# Patient Record
Sex: MALE | Race: WHITE | NOT HISPANIC OR LATINO | Employment: UNEMPLOYED | URBAN - METROPOLITAN AREA
[De-identification: names, ages, dates, MRNs, and addresses within clinical notes are randomized per-mention and may not be internally consistent; named-entity substitution may affect disease eponyms.]

---

## 2019-08-26 PROBLEM — D72.829 LEUKOCYTOSIS: Status: ACTIVE | Noted: 2019-08-26

## 2019-08-26 PROBLEM — I46.9 CARDIAC ARREST: Status: ACTIVE | Noted: 2019-08-26

## 2019-08-29 PROBLEM — R56.9 SEIZURE: Status: ACTIVE | Noted: 2019-08-29

## 2019-09-01 PROBLEM — D72.829 LEUKOCYTOSIS: Status: RESOLVED | Noted: 2019-08-26 | Resolved: 2019-09-01

## 2019-09-01 PROBLEM — G92.9 ENCEPHALOPATHY, TOXIC: Status: ACTIVE | Noted: 2019-09-01

## 2022-02-02 ENCOUNTER — HISTORICAL (OUTPATIENT)
Dept: ADMINISTRATIVE | Facility: HOSPITAL | Age: 32
End: 2022-02-02

## 2023-04-13 ENCOUNTER — HOSPITAL ENCOUNTER (EMERGENCY)
Facility: HOSPITAL | Age: 33
Discharge: LEFT BEFORE DISCHARGE | End: 2023-04-14
Attending: STUDENT IN AN ORGANIZED HEALTH CARE EDUCATION/TRAINING PROGRAM
Payer: MEDICAID

## 2023-04-13 DIAGNOSIS — Z53.29 LEFT AGAINST MEDICAL ADVICE: Primary | ICD-10-CM

## 2023-04-13 DIAGNOSIS — R00.2 PALPITATIONS: ICD-10-CM

## 2023-04-13 PROCEDURE — 99284 EMERGENCY DEPT VISIT MOD MDM: CPT

## 2023-04-13 NOTE — LETTER
Patient: Britton Perez  YOB: 1990  Date: 4/14/2023 Time: 12:30 AM  Location: [facility]    Leaving the Hospital Against Medical Advice    Chart #:[id number]    This will certify that I, the undersigned,    ______________________________________________________________________    A patient in the above named medical center, having requested discharge and removal from the medical center against the advice of my attending physician(s), hereby release [facility], its physicians, officers and employees, severally and individually, from any and all liability of any nature whatsoever for any injury or harm or complication of any kind that may result directly or indirectly, by reason of my terminating my stay as a patient at [facility] and my departure from [facility], and hereby waive any and all rights of action I may now have or later acquire as a result of my voluntary departure from [facility] and the termination of my stay as a patient therein.    This release is made with the full knowledge of the danger that may result from the action which I am taking.      Date:_______________________                         ___________________________                                                                                    Patient/Legal Representative    Witness:        ____________________________                          ___________________________  Nurse                                                                        Physician

## 2023-04-14 VITALS
SYSTOLIC BLOOD PRESSURE: 135 MMHG | TEMPERATURE: 97 F | HEART RATE: 104 BPM | DIASTOLIC BLOOD PRESSURE: 83 MMHG | OXYGEN SATURATION: 97 % | RESPIRATION RATE: 21 BRPM | WEIGHT: 150.13 LBS | BODY MASS INDEX: 19.27 KG/M2 | HEIGHT: 74 IN

## 2023-04-14 NOTE — ED PROVIDER NOTES
Encounter Date: 4/13/2023       History     Chief Complaint   Patient presents with    Palpitations     Palpitations while sitting at home PTA. H/O AICD     Pt is a 32 y.o. male who presents to the [facility] ED complaining of an episode of palpitations which began while he was watching television earlier tonight. Denies chest pain, SOB, weakness, dizziness, fever, nausea, vomiting, or loss of bowel or bladder control. Hx of HIV, HTN, heart failure, anxiety, seizure disorder. Reports having an internal defibrillator but denies experiencing a shock.    Review of patient's allergies indicates:   Allergen Reactions    Penicillins      Past Medical History:   Diagnosis Date    Cardiac arrest 08/2019    pulseless, AED required    Depression     Heart failure, type unknown     fr other facility medical record    Hepatitis C     HIV (human immunodeficiency virus infection)     HTN (hypertension)     IV drug user     Myocardial infarct, old     fr transfer medical records    Neuropathy     left foot    Overdose     heroine, meth    Pneumonia     Polysubstance abuse     heroin methadone    PTSD (post-traumatic stress disorder)     from prev medical record    Seizure 08/2019    POSSIBLE Seizure prior to cardiac arrest, unclear     Past Surgical History:   Procedure Laterality Date    CHEST TUBE INSERTION Bilateral     bilat when intubated in the past (removed)    CORONARY ANGIOGRAPHY N/A 8/30/2019    Procedure: ANGIOGRAM, CORONARY ARTERY;  Surgeon: Misbah Culp MD;  Location: [facility] [place] CATH;  Service: Cardiology;  Laterality: N/A;    GASTROSTOMY TUBE PLACEMENT      fr other facility medical record    INSERTION OF PACEMAKER  2007    LEFT HEART CATHETERIZATION Left 8/30/2019    Procedure: Left heart cath;  Surgeon: Misbah Culp MD;  Location: [facility] [place] CATH;  Service: Cardiology;  Laterality: Left;    REMOVAL OF PACEMAKER  2014    TRACHEOSTOMY      x2 in the past (removed/healed as of August 2019)     No family history on file.  Social  History     Tobacco Use    Smoking status: Every Day   Substance Use Topics    Drug use: Yes     Types: IV, Methamphetamines     Comment: heroine opiates     Review of Systems   Constitutional:  Negative for chills, diaphoresis, fatigue and fever.   HENT:  Negative for facial swelling, postnasal drip, rhinorrhea, sinus pressure, sinus pain, sore throat and trouble swallowing.    Respiratory:  Negative for cough, chest tightness, shortness of breath and wheezing.    Cardiovascular:  Positive for palpitations. Negative for chest pain and leg swelling.   Gastrointestinal:  Negative for abdominal pain, diarrhea, nausea and vomiting.   Genitourinary:  Negative for dysuria, flank pain, hematuria and urgency.   Musculoskeletal:  Negative for arthralgias, back pain and myalgias.   Skin:  Negative for color change and rash.   Neurological:  Negative for dizziness, syncope, weakness and headaches.   Hematological:  Does not bruise/bleed easily.   All other systems reviewed and are negative.    Physical Exam     Initial Vitals [04/13/23 2339]   BP Pulse Resp Temp SpO2   135/83 (!) 134 20 97.3 °F (36.3 °C) 98 %      MAP       --         Physical Exam    Nursing note and vitals reviewed.  Constitutional: Vital signs are normal. He appears well-developed and well-nourished.   HENT:   Head: Normocephalic.   Nose: Nose normal.   Mouth/Throat: Oropharynx is clear and moist.   Eyes: Conjunctivae and EOM are normal. Pupils are equal, round, and reactive to light.   Neck: Neck supple.   Normal range of motion.  Cardiovascular:  Normal rate, regular rhythm, normal heart sounds and intact distal pulses.           Pulmonary/Chest: Effort normal and breath sounds normal. No respiratory distress. He has no wheezes. He has no rhonchi. He has no rales. He exhibits no tenderness.   Abdominal: Abdomen is soft and flat. Bowel sounds are normal. There is no abdominal tenderness. There is no rebound, no guarding, no tenderness at McBurney's point  and negative Baca's sign.   Musculoskeletal:         General: Normal range of motion.      Cervical back: Normal range of motion and neck supple.     Neurological: He is alert and oriented to person, place, and time. He has normal strength.   Skin: Skin is warm and dry. Capillary refill takes less than 2 seconds.   Psychiatric: His behavior is normal. Judgment and thought content normal. His mood appears anxious.       ED Course   Procedures  Labs Reviewed - No data to display         Imaging Results              X-Ray Chest 1 View (Final result)  Result time 04/14/23 07:11:55      Final result by Jero Ahumada MD (04/14/23 07:11:55)                   Impression:      Interval revision of cardiac device.  No acute cardiopulmonary process.      Electronically signed by: Jero Ahumada  Date:    04/14/2023  Time:    07:11               Narrative:    EXAMINATION:  XR CHEST 1 VIEW    CLINICAL HISTORY:  Palpitations    TECHNIQUE:  Single view of the chest    COMPARISON:  09/03/2021    FINDINGS:  Trace bilateral pleural effusions with left-sided cardiac device.  No pneumothorax.  No focal opacification.                                       Medications - No data to display    Medical Decision Making:   Differential Diagnosis:   Anemia  Electrolyte imbalance  Palpitations  Anxiety  AMI  Clinical Tests:   Lab Tests: Ordered  Radiological Study: Ordered  Medical Tests: Ordered  ED Management:  12:32 AM I was just informed by ED nursing staff that pt is feeling better and requesting to leave the department. I have discussed with opt that due to his symptoms, I feel that diagnostic testing is appropriate. Pt verbalizing understanding, continues to request discharge. AMA form supplied and signed per pt.      APC / Resident Notes:   I was not physically present during the history, exam or disposition of this patient. I was available at all times for consultation. (Zmora)                   Clinical Impression:   Final  diagnoses:  [R00.2] Palpitations  [Z53.29] Left against medical advice (Primary)        ED Disposition Condition    AMA Stable                Kwabena Serrano Jr., NESSA  04/14/23 0034       Darryn Chavez MD  04/22/23 8096

## 2024-01-21 ENCOUNTER — HOSPITAL ENCOUNTER (EMERGENCY)
Facility: HOSPITAL | Age: 34
Discharge: ANOTHER HOSPITAL | End: 2024-01-21
Attending: EMERGENCY MEDICINE
Payer: COMMERCIAL

## 2024-01-21 ENCOUNTER — HOSPITAL ENCOUNTER (INPATIENT)
Facility: HOSPITAL | Age: 34
LOS: 6 days | Discharge: HOME | DRG: 309 | End: 2024-01-27
Attending: EMERGENCY MEDICINE | Admitting: INTERNAL MEDICINE
Payer: MEDICAID

## 2024-01-21 VITALS
TEMPERATURE: 100 F | WEIGHT: 155 LBS | RESPIRATION RATE: 15 BRPM | BODY MASS INDEX: 19.89 KG/M2 | OXYGEN SATURATION: 100 % | DIASTOLIC BLOOD PRESSURE: 59 MMHG | SYSTOLIC BLOOD PRESSURE: 114 MMHG | HEART RATE: 59 BPM | HEIGHT: 74 IN

## 2024-01-21 DIAGNOSIS — I47.20 VT (VENTRICULAR TACHYCARDIA): ICD-10-CM

## 2024-01-21 DIAGNOSIS — I25.10 CAD (CORONARY ARTERY DISEASE): ICD-10-CM

## 2024-01-21 DIAGNOSIS — B20 CURRENTLY ASYMPTOMATIC HIV INFECTION, WITH HISTORY OF HIV-RELATED ILLNESS: ICD-10-CM

## 2024-01-21 DIAGNOSIS — B20 HIV INFECTION, UNSPECIFIED SYMPTOM STATUS: ICD-10-CM

## 2024-01-21 DIAGNOSIS — I45.81 PROLONGED QT INTERVAL SYNDROME: ICD-10-CM

## 2024-01-21 DIAGNOSIS — I47.20 V-TACH: ICD-10-CM

## 2024-01-21 DIAGNOSIS — R94.31 PROLONGED Q-T INTERVAL ON ECG: ICD-10-CM

## 2024-01-21 DIAGNOSIS — I49.01 VENTRICULAR FIBRILLATION: Primary | ICD-10-CM

## 2024-01-21 DIAGNOSIS — I49.9 ARRHYTHMIA: ICD-10-CM

## 2024-01-21 DIAGNOSIS — Z78.9 PACED RHYTHM ON CARDIAC MONITOR: ICD-10-CM

## 2024-01-21 DIAGNOSIS — I47.20 V TACH: Primary | ICD-10-CM

## 2024-01-21 DIAGNOSIS — R07.9 CHEST PAIN: ICD-10-CM

## 2024-01-21 DIAGNOSIS — Z13.9 SCREENING DUE: ICD-10-CM

## 2024-01-21 LAB
ALBUMIN SERPL-MCNC: 4.3 G/DL (ref 3.5–5)
ALBUMIN/GLOB SERPL: 0.9 RATIO (ref 1.1–2)
ALP SERPL-CCNC: 91 UNIT/L (ref 40–150)
ALT SERPL-CCNC: 8 UNIT/L (ref 0–55)
AST SERPL-CCNC: 20 UNIT/L (ref 5–34)
BASOPHILS # BLD AUTO: 0.01 X10(3)/MCL
BASOPHILS NFR BLD AUTO: 0.1 %
BILIRUB SERPL-MCNC: 0.6 MG/DL
BUN SERPL-MCNC: 15.5 MG/DL (ref 8.9–20.6)
CALCIUM SERPL-MCNC: 9.9 MG/DL (ref 8.4–10.2)
CHLORIDE SERPL-SCNC: 103 MMOL/L (ref 98–107)
CK MB SERPL-MCNC: 4.3 NG/ML
CK SERPL-CCNC: 338 U/L (ref 30–200)
CO2 SERPL-SCNC: 21 MMOL/L (ref 22–29)
CREAT SERPL-MCNC: 0.89 MG/DL (ref 0.73–1.18)
EOSINOPHIL # BLD AUTO: 0 X10(3)/MCL (ref 0–0.9)
EOSINOPHIL NFR BLD AUTO: 0 %
ERYTHROCYTE [DISTWIDTH] IN BLOOD BY AUTOMATED COUNT: 12.8 % (ref 11.5–17)
FLUAV AG UPPER RESP QL IA.RAPID: NOT DETECTED
FLUBV AG UPPER RESP QL IA.RAPID: NOT DETECTED
GFR SERPLBLD CREATININE-BSD FMLA CKD-EPI: >60 MLS/MIN/1.73/M2
GLOBULIN SER-MCNC: 4.8 GM/DL (ref 2.4–3.5)
GLUCOSE SERPL-MCNC: 105 MG/DL (ref 74–100)
HCT VFR BLD AUTO: 42.3 % (ref 42–52)
HGB BLD-MCNC: 14.9 G/DL (ref 14–18)
HOLD SPECIMEN: NORMAL
HOLD SPECIMEN: NORMAL
IMM GRANULOCYTES # BLD AUTO: 0.02 X10(3)/MCL (ref 0–0.04)
IMM GRANULOCYTES NFR BLD AUTO: 0.2 %
LACTATE SERPL-SCNC: 2.1 MMOL/L (ref 0.5–2.2)
LYMPHOCYTES # BLD AUTO: 1.07 X10(3)/MCL (ref 0.6–4.6)
LYMPHOCYTES NFR BLD AUTO: 11.9 %
MAGNESIUM SERPL-MCNC: 2.2 MG/DL (ref 1.6–2.6)
MCH RBC QN AUTO: 29.5 PG (ref 27–31)
MCHC RBC AUTO-ENTMCNC: 35.2 G/DL (ref 33–36)
MCV RBC AUTO: 83.8 FL (ref 80–94)
MONOCYTES # BLD AUTO: 0.19 X10(3)/MCL (ref 0.1–1.3)
MONOCYTES NFR BLD AUTO: 2.1 %
NEUTROPHILS # BLD AUTO: 7.67 X10(3)/MCL (ref 2.1–9.2)
NEUTROPHILS NFR BLD AUTO: 85.7 %
NRBC BLD AUTO-RTO: 0 %
PLATELET # BLD AUTO: 202 X10(3)/MCL (ref 130–400)
PMV BLD AUTO: 9.8 FL (ref 7.4–10.4)
POCT GLUCOSE: 104 MG/DL (ref 70–110)
POTASSIUM SERPL-SCNC: 4.1 MMOL/L (ref 3.5–5.1)
PROT SERPL-MCNC: 9.1 GM/DL (ref 6.4–8.3)
RBC # BLD AUTO: 5.05 X10(6)/MCL (ref 4.7–6.1)
SARS-COV-2 RNA RESP QL NAA+PROBE: NOT DETECTED
SODIUM SERPL-SCNC: 138 MMOL/L (ref 136–145)
T4 FREE SERPL-MCNC: 1.16 NG/DL (ref 0.7–1.48)
TROPONIN I SERPL-MCNC: <0.01 NG/ML (ref 0–0.04)
TSH SERPL-ACNC: 0.29 UIU/ML (ref 0.35–4.94)
WBC # SPEC AUTO: 8.96 X10(3)/MCL (ref 4.5–11.5)

## 2024-01-21 PROCEDURE — 85025 COMPLETE CBC W/AUTO DIFF WBC: CPT | Performed by: EMERGENCY MEDICINE

## 2024-01-21 PROCEDURE — 63600175 PHARM REV CODE 636 W HCPCS

## 2024-01-21 PROCEDURE — 82550 ASSAY OF CK (CPK): CPT | Performed by: EMERGENCY MEDICINE

## 2024-01-21 PROCEDURE — 84443 ASSAY THYROID STIM HORMONE: CPT | Performed by: EMERGENCY MEDICINE

## 2024-01-21 PROCEDURE — 93010 ELECTROCARDIOGRAM REPORT: CPT | Mod: ,,, | Performed by: STUDENT IN AN ORGANIZED HEALTH CARE EDUCATION/TRAINING PROGRAM

## 2024-01-21 PROCEDURE — 82962 GLUCOSE BLOOD TEST: CPT

## 2024-01-21 PROCEDURE — 80053 COMPREHEN METABOLIC PANEL: CPT | Performed by: EMERGENCY MEDICINE

## 2024-01-21 PROCEDURE — 20000000 HC ICU ROOM

## 2024-01-21 PROCEDURE — 25000003 PHARM REV CODE 250: Performed by: EMERGENCY MEDICINE

## 2024-01-21 PROCEDURE — 99285 EMERGENCY DEPT VISIT HI MDM: CPT | Mod: 25,27

## 2024-01-21 PROCEDURE — 84439 ASSAY OF FREE THYROXINE: CPT | Performed by: INTERNAL MEDICINE

## 2024-01-21 PROCEDURE — 96374 THER/PROPH/DIAG INJ IV PUSH: CPT | Mod: 59

## 2024-01-21 PROCEDURE — 83735 ASSAY OF MAGNESIUM: CPT | Performed by: EMERGENCY MEDICINE

## 2024-01-21 PROCEDURE — 63600175 PHARM REV CODE 636 W HCPCS: Performed by: EMERGENCY MEDICINE

## 2024-01-21 PROCEDURE — 99285 EMERGENCY DEPT VISIT HI MDM: CPT | Mod: 25

## 2024-01-21 PROCEDURE — 84484 ASSAY OF TROPONIN QUANT: CPT | Performed by: EMERGENCY MEDICINE

## 2024-01-21 PROCEDURE — 63600175 PHARM REV CODE 636 W HCPCS: Performed by: STUDENT IN AN ORGANIZED HEALTH CARE EDUCATION/TRAINING PROGRAM

## 2024-01-21 PROCEDURE — 86803 HEPATITIS C AB TEST: CPT | Performed by: INTERNAL MEDICINE

## 2024-01-21 PROCEDURE — 96375 TX/PRO/DX INJ NEW DRUG ADDON: CPT

## 2024-01-21 PROCEDURE — 0240U COVID/FLU A&B PCR: CPT | Performed by: EMERGENCY MEDICINE

## 2024-01-21 PROCEDURE — 96365 THER/PROPH/DIAG IV INF INIT: CPT

## 2024-01-21 PROCEDURE — 82553 CREATINE MB FRACTION: CPT | Performed by: EMERGENCY MEDICINE

## 2024-01-21 PROCEDURE — 96374 THER/PROPH/DIAG INJ IV PUSH: CPT

## 2024-01-21 PROCEDURE — 93005 ELECTROCARDIOGRAM TRACING: CPT

## 2024-01-21 PROCEDURE — 83605 ASSAY OF LACTIC ACID: CPT | Performed by: EMERGENCY MEDICINE

## 2024-01-21 RX ORDER — ONDANSETRON HYDROCHLORIDE 2 MG/ML
4 INJECTION, SOLUTION INTRAVENOUS
Status: COMPLETED | OUTPATIENT
Start: 2024-01-21 | End: 2024-01-21

## 2024-01-21 RX ORDER — MORPHINE SULFATE 4 MG/ML
INJECTION, SOLUTION INTRAMUSCULAR; INTRAVENOUS
Status: DISCONTINUED
Start: 2024-01-21 | End: 2024-01-21 | Stop reason: WASHOUT

## 2024-01-21 RX ORDER — FAMOTIDINE 20 MG/1
20 TABLET, FILM COATED ORAL 2 TIMES DAILY
Status: DISCONTINUED | OUTPATIENT
Start: 2024-01-22 | End: 2024-01-25

## 2024-01-21 RX ORDER — MAGNESIUM SULFATE HEPTAHYDRATE 40 MG/ML
2 INJECTION, SOLUTION INTRAVENOUS
Status: COMPLETED | OUTPATIENT
Start: 2024-01-21 | End: 2024-01-21

## 2024-01-21 RX ORDER — LIDOCAINE HYDROCHLORIDE ANHYDROUS AND DEXTROSE MONOHYDRATE .8; 5 G/100ML; G/100ML
1 INJECTION, SOLUTION INTRAVENOUS CONTINUOUS
Status: DISCONTINUED | OUTPATIENT
Start: 2024-01-21 | End: 2024-01-21 | Stop reason: HOSPADM

## 2024-01-21 RX ORDER — METOCLOPRAMIDE HYDROCHLORIDE 5 MG/ML
10 INJECTION INTRAMUSCULAR; INTRAVENOUS
Status: COMPLETED | OUTPATIENT
Start: 2024-01-21 | End: 2024-01-21

## 2024-01-21 RX ORDER — AMIODARONE HYDROCHLORIDE 150 MG/3ML
150 INJECTION, SOLUTION INTRAVENOUS
Status: COMPLETED | OUTPATIENT
Start: 2024-01-21 | End: 2024-01-21

## 2024-01-21 RX ORDER — MORPHINE SULFATE 4 MG/ML
2 INJECTION, SOLUTION INTRAMUSCULAR; INTRAVENOUS EVERY 6 HOURS PRN
Status: DISCONTINUED | OUTPATIENT
Start: 2024-01-22 | End: 2024-01-22

## 2024-01-21 RX ORDER — MORPHINE SULFATE 4 MG/ML
4 INJECTION, SOLUTION INTRAMUSCULAR; INTRAVENOUS
Status: COMPLETED | OUTPATIENT
Start: 2024-01-21 | End: 2024-01-21

## 2024-01-21 RX ORDER — MAGNESIUM SULFATE HEPTAHYDRATE 40 MG/ML
2 INJECTION, SOLUTION INTRAVENOUS
Status: DISCONTINUED | OUTPATIENT
Start: 2024-01-21 | End: 2024-01-27 | Stop reason: HOSPADM

## 2024-01-21 RX ORDER — POTASSIUM CHLORIDE 7.45 MG/ML
20 INJECTION INTRAVENOUS DAILY PRN
Status: DISCONTINUED | OUTPATIENT
Start: 2024-01-21 | End: 2024-01-27 | Stop reason: HOSPADM

## 2024-01-21 RX ORDER — LIDOCAINE HYDROCHLORIDE ANHYDROUS AND DEXTROSE MONOHYDRATE .8; 5 G/100ML; G/100ML
0.5 INJECTION, SOLUTION INTRAVENOUS CONTINUOUS
Status: DISCONTINUED | OUTPATIENT
Start: 2024-01-22 | End: 2024-01-27

## 2024-01-21 RX ORDER — THIAMINE HCL 100 MG
100 TABLET ORAL DAILY
Status: DISCONTINUED | OUTPATIENT
Start: 2024-01-22 | End: 2024-01-27 | Stop reason: HOSPADM

## 2024-01-21 RX ORDER — ENOXAPARIN SODIUM 100 MG/ML
40 INJECTION SUBCUTANEOUS EVERY 24 HOURS
Status: DISCONTINUED | OUTPATIENT
Start: 2024-01-22 | End: 2024-01-27 | Stop reason: HOSPADM

## 2024-01-21 RX ORDER — AMIODARONE HYDROCHLORIDE 150 MG/3ML
INJECTION, SOLUTION INTRAVENOUS
Status: COMPLETED
Start: 2024-01-21 | End: 2024-01-21

## 2024-01-21 RX ORDER — SODIUM CHLORIDE 0.9 % (FLUSH) 0.9 %
10 SYRINGE (ML) INJECTION
Status: DISCONTINUED | OUTPATIENT
Start: 2024-01-21 | End: 2024-01-27 | Stop reason: HOSPADM

## 2024-01-21 RX ADMIN — MORPHINE SULFATE 4 MG: 4 INJECTION, SOLUTION INTRAMUSCULAR; INTRAVENOUS at 09:01

## 2024-01-21 RX ADMIN — MAGNESIUM SULFATE HEPTAHYDRATE 2 G: 40 INJECTION, SOLUTION INTRAVENOUS at 07:01

## 2024-01-21 RX ADMIN — AMIODARONE HYDROCHLORIDE 150 MG: 50 INJECTION, SOLUTION INTRAVENOUS at 09:01

## 2024-01-21 RX ADMIN — SODIUM CHLORIDE 2000 ML: 9 INJECTION, SOLUTION INTRAVENOUS at 07:01

## 2024-01-21 RX ADMIN — ONDANSETRON 4 MG: 2 INJECTION INTRAMUSCULAR; INTRAVENOUS at 06:01

## 2024-01-21 RX ADMIN — ONDANSETRON 4 MG: 2 INJECTION INTRAMUSCULAR; INTRAVENOUS at 09:01

## 2024-01-21 RX ADMIN — AMIODARONE HYDROCHLORIDE 1 MG/MIN: 1.8 INJECTION, SOLUTION INTRAVENOUS at 09:01

## 2024-01-21 RX ADMIN — METOCLOPRAMIDE 10 MG: 5 INJECTION, SOLUTION INTRAMUSCULAR; INTRAVENOUS at 10:01

## 2024-01-21 RX ADMIN — AMIODARONE HYDROCHLORIDE 150 MG: 150 INJECTION, SOLUTION INTRAVENOUS at 09:01

## 2024-01-21 NOTE — Clinical Note
The catheter was inserted in the right ventricle. The transvenous pacing lead was inserted into the RV and was placed and capture was confirmed. The pacer was paced at 90 BPM 5 mA 5 mV.

## 2024-01-21 NOTE — Clinical Note
The groin and neck was prepped. The site was prepped with ChloraPrep. The site was clipped. The patient was draped.

## 2024-01-22 LAB
AGE: 35
ALBUMIN SERPL-MCNC: 3.6 G/DL (ref 3.5–5)
ALBUMIN/GLOB SERPL: 1 RATIO (ref 1.1–2)
ALP SERPL-CCNC: 79 UNIT/L (ref 40–150)
ALT SERPL-CCNC: 7 UNIT/L (ref 0–55)
AST SERPL-CCNC: 13 UNIT/L (ref 5–34)
AV INDEX (PROSTH): 0.52
AV MEAN GRADIENT: 5 MMHG
AV PEAK GRADIENT: 9 MMHG
AV VELOCITY RATIO: 0.49
BASOPHILS # BLD AUTO: 0.01 X10(3)/MCL
BASOPHILS NFR BLD AUTO: 0.1 %
BILIRUB SERPL-MCNC: 0.4 MG/DL
BSA FOR ECHO PROCEDURE: 1.92 M2
BUN SERPL-MCNC: 14.5 MG/DL (ref 8.9–20.6)
CALCIUM SERPL-MCNC: 8.8 MG/DL (ref 8.4–10.2)
CD3+CD4+ CELLS # SPEC: 62 UNIT/L (ref 589–1505)
CD3+CD4+ CELLS NFR BLD: 6 %
CHLORIDE SERPL-SCNC: 107 MMOL/L (ref 98–107)
CO2 SERPL-SCNC: 20 MMOL/L (ref 22–29)
CREAT SERPL-MCNC: 0.71 MG/DL (ref 0.73–1.18)
CV ECHO LV RWT: 0.46 CM
DOP CALC AO PEAK VEL: 1.5 M/S
DOP CALC AO VTI: 28.7 CM
DOP CALC LVOT PEAK VEL: 0.73 M/S
DOP CALCLVOT PEAK VEL VTI: 14.8 CM
E WAVE DECELERATION TIME: 214 MSEC
E/A RATIO: 1.69
E/E' RATIO: 8.27 M/S
ECHO LV POSTERIOR WALL: 1.1 CM (ref 0.6–1.1)
EOSINOPHIL # BLD AUTO: 0 X10(3)/MCL (ref 0–0.9)
EOSINOPHIL NFR BLD AUTO: 0 %
ERYTHROCYTE [DISTWIDTH] IN BLOOD BY AUTOMATED COUNT: 12.8 % (ref 11.5–17)
FRACTIONAL SHORTENING: 33 % (ref 28–44)
GFR SERPLBLD CREATININE-BSD FMLA CKD-EPI: >60 MLS/MIN/1.73/M2
GLOBULIN SER-MCNC: 3.5 GM/DL (ref 2.4–3.5)
GLUCOSE SERPL-MCNC: 110 MG/DL (ref 74–100)
HCT VFR BLD AUTO: 38.5 % (ref 42–52)
HCV AB SERPL QL IA: REACTIVE
HGB BLD-MCNC: 13.4 G/DL (ref 14–18)
IMM GRANULOCYTES # BLD AUTO: 0.02 X10(3)/MCL (ref 0–0.04)
IMM GRANULOCYTES NFR BLD AUTO: 0.2 %
INTERVENTRICULAR SEPTUM: 0.8 CM (ref 0.6–1.1)
LEFT ATRIUM SIZE: 3.2 CM
LEFT ATRIUM VOLUME INDEX MOD: 19.4 ML/M2
LEFT ATRIUM VOLUME MOD: 37.8 CM3
LEFT INTERNAL DIMENSION IN SYSTOLE: 3.2 CM (ref 2.1–4)
LEFT VENTRICLE DIASTOLIC VOLUME INDEX: 55.38 ML/M2
LEFT VENTRICLE DIASTOLIC VOLUME: 108 ML
LEFT VENTRICLE MASS INDEX: 81 G/M2
LEFT VENTRICLE SYSTOLIC VOLUME INDEX: 21 ML/M2
LEFT VENTRICLE SYSTOLIC VOLUME: 41 ML
LEFT VENTRICULAR INTERNAL DIMENSION IN DIASTOLE: 4.8 CM (ref 3.5–6)
LEFT VENTRICULAR MASS: 158.82 G
LV LATERAL E/E' RATIO: 8.27 M/S
LV SEPTAL E/E' RATIO: 8.27 M/S
LVOT MG: 1 MMHG
LVOT MV: 0.46 CM/S
LYMPHOCYTES # BLD AUTO: 0.99 X10(3)/MCL (ref 0.6–4.6)
LYMPHOCYTES # BLD AUTO: 1032 X10(3)/MCL (ref 1260–5520)
LYMPHOCYTES NFR BLD AUTO: 11.5 %
LYMPHOCYTES NFR LN MANUAL: 12 % (ref 28–48)
LYMPHOMA - T-CELL MARKERS SPEC-IMP: ABNORMAL
MAGNESIUM SERPL-MCNC: 2.1 MG/DL (ref 1.6–2.6)
MAGNESIUM SERPL-MCNC: 2.2 MG/DL (ref 1.6–2.6)
MAGNESIUM SERPL-MCNC: 2.3 MG/DL (ref 1.6–2.6)
MCH RBC QN AUTO: 29.2 PG (ref 27–31)
MCHC RBC AUTO-ENTMCNC: 34.8 G/DL (ref 33–36)
MCV RBC AUTO: 83.9 FL (ref 80–94)
MONOCYTES # BLD AUTO: 0.2 X10(3)/MCL (ref 0.1–1.3)
MONOCYTES NFR BLD AUTO: 2.3 %
MV PEAK A VEL: 0.54 M/S
MV PEAK E VEL: 0.91 M/S
NEUTROPHILS # BLD AUTO: 7.38 X10(3)/MCL (ref 2.1–9.2)
NEUTROPHILS NFR BLD AUTO: 85.9 %
NRBC BLD AUTO-RTO: 0 %
OHS LV EJECTION FRACTION SIMPSONS BIPLANE MOD: 58 %
PHOSPHATE SERPL-MCNC: 2.4 MG/DL (ref 2.3–4.7)
PISA TR MAX VEL: 1.33 M/S
PLATELET # BLD AUTO: 164 X10(3)/MCL (ref 130–400)
PMV BLD AUTO: 10.1 FL (ref 7.4–10.4)
POTASSIUM SERPL-SCNC: 3.8 MMOL/L (ref 3.5–5.1)
POTASSIUM SERPL-SCNC: 3.9 MMOL/L (ref 3.5–5.1)
POTASSIUM SERPL-SCNC: 4.1 MMOL/L (ref 3.5–5.1)
PROT SERPL-MCNC: 7.1 GM/DL (ref 6.4–8.3)
RBC # BLD AUTO: 4.59 X10(6)/MCL (ref 4.7–6.1)
SODIUM SERPL-SCNC: 139 MMOL/L (ref 136–145)
T PALLIDUM AB SER QL: NONREACTIVE
TDI LATERAL: 0.11 M/S
TDI SEPTAL: 0.11 M/S
TDI: 0.11 M/S
TR MAX PG: 7 MMHG
TRICUSPID ANNULAR PLANE SYSTOLIC EXCURSION: 2.04 CM
TROPONIN I SERPL-MCNC: <0.01 NG/ML (ref 0–0.04)
TROPONIN I SERPL-MCNC: <0.01 NG/ML (ref 0–0.04)
WBC # BLD AUTO: 8600 /MM3 (ref 4500–11500)
WBC # SPEC AUTO: 8.6 X10(3)/MCL (ref 4.5–11.5)
Z-SCORE OF LEFT VENTRICULAR DIMENSION IN END DIASTOLE: -1.47
Z-SCORE OF LEFT VENTRICULAR DIMENSION IN END SYSTOLE: -0.52

## 2024-01-22 PROCEDURE — 84100 ASSAY OF PHOSPHORUS: CPT | Performed by: INTERNAL MEDICINE

## 2024-01-22 PROCEDURE — 83735 ASSAY OF MAGNESIUM: CPT | Performed by: INTERNAL MEDICINE

## 2024-01-22 PROCEDURE — 63600175 PHARM REV CODE 636 W HCPCS: Performed by: INTERNAL MEDICINE

## 2024-01-22 PROCEDURE — 25000003 PHARM REV CODE 250: Performed by: INTERNAL MEDICINE

## 2024-01-22 PROCEDURE — 93010 ELECTROCARDIOGRAM REPORT: CPT | Mod: ,,, | Performed by: STUDENT IN AN ORGANIZED HEALTH CARE EDUCATION/TRAINING PROGRAM

## 2024-01-22 PROCEDURE — 20000000 HC ICU ROOM

## 2024-01-22 PROCEDURE — 86780 TREPONEMA PALLIDUM: CPT | Performed by: INTERNAL MEDICINE

## 2024-01-22 PROCEDURE — 85025 COMPLETE CBC W/AUTO DIFF WBC: CPT | Performed by: INTERNAL MEDICINE

## 2024-01-22 PROCEDURE — 63600175 PHARM REV CODE 636 W HCPCS: Performed by: NURSE PRACTITIONER

## 2024-01-22 PROCEDURE — C1894 INTRO/SHEATH, NON-LASER: HCPCS | Performed by: INTERNAL MEDICINE

## 2024-01-22 PROCEDURE — 25000003 PHARM REV CODE 250

## 2024-01-22 PROCEDURE — 5A1223Z PERFORMANCE OF CARDIAC PACING, CONTINUOUS: ICD-10-PCS | Performed by: INTERNAL MEDICINE

## 2024-01-22 PROCEDURE — 33210 INSERT ELECTRD/PM CATH SNGL: CPT | Performed by: INTERNAL MEDICINE

## 2024-01-22 PROCEDURE — 80053 COMPREHEN METABOLIC PANEL: CPT | Performed by: INTERNAL MEDICINE

## 2024-01-22 PROCEDURE — 94761 N-INVAS EAR/PLS OXIMETRY MLT: CPT

## 2024-01-22 PROCEDURE — 84132 ASSAY OF SERUM POTASSIUM: CPT | Performed by: INTERNAL MEDICINE

## 2024-01-22 PROCEDURE — 87040 BLOOD CULTURE FOR BACTERIA: CPT | Performed by: INTERNAL MEDICINE

## 2024-01-22 PROCEDURE — 93005 ELECTROCARDIOGRAM TRACING: CPT

## 2024-01-22 PROCEDURE — 99152 MOD SED SAME PHYS/QHP 5/>YRS: CPT | Performed by: INTERNAL MEDICINE

## 2024-01-22 PROCEDURE — 86361 T CELL ABSOLUTE COUNT: CPT | Performed by: INTERNAL MEDICINE

## 2024-01-22 PROCEDURE — 27201423 OPTIME MED/SURG SUP & DEVICES STERILE SUPPLY: Performed by: INTERNAL MEDICINE

## 2024-01-22 PROCEDURE — 84484 ASSAY OF TROPONIN QUANT: CPT | Performed by: INTERNAL MEDICINE

## 2024-01-22 PROCEDURE — 27000221 HC OXYGEN, UP TO 24 HOURS

## 2024-01-22 RX ORDER — SCOLOPAMINE TRANSDERMAL SYSTEM 1 MG/1
1 PATCH, EXTENDED RELEASE TRANSDERMAL
Status: COMPLETED | OUTPATIENT
Start: 2024-01-22 | End: 2024-01-25

## 2024-01-22 RX ORDER — LORAZEPAM 0.5 MG/1
0.5 TABLET ORAL 3 TIMES DAILY PRN
Status: DISCONTINUED | OUTPATIENT
Start: 2024-01-22 | End: 2024-01-22

## 2024-01-22 RX ORDER — DEXMEDETOMIDINE HYDROCHLORIDE 4 UG/ML
INJECTION, SOLUTION INTRAVENOUS
Status: COMPLETED
Start: 2024-01-22 | End: 2024-01-22

## 2024-01-22 RX ORDER — BUPRENORPHINE 2 MG/1
2 TABLET SUBLINGUAL DAILY
Status: DISCONTINUED | OUTPATIENT
Start: 2024-01-22 | End: 2024-01-22

## 2024-01-22 RX ORDER — LIDOCAINE 40 MG/G
CREAM TOPICAL ONCE
Status: DISCONTINUED | OUTPATIENT
Start: 2024-01-22 | End: 2024-01-27

## 2024-01-22 RX ORDER — LORAZEPAM 2 MG/ML
2 INJECTION INTRAMUSCULAR ONCE
Status: COMPLETED | OUTPATIENT
Start: 2024-01-22 | End: 2024-01-22

## 2024-01-22 RX ORDER — BUPRENORPHINE AND NALOXONE 8; 2 MG/1; MG/1
1 FILM, SOLUBLE BUCCAL; SUBLINGUAL DAILY
Status: DISCONTINUED | OUTPATIENT
Start: 2024-01-22 | End: 2024-01-22

## 2024-01-22 RX ORDER — FENTANYL CITRATE 50 UG/ML
INJECTION, SOLUTION INTRAMUSCULAR; INTRAVENOUS
Status: DISCONTINUED | OUTPATIENT
Start: 2024-01-22 | End: 2024-01-22 | Stop reason: HOSPADM

## 2024-01-22 RX ORDER — ATOVAQUONE 750 MG/5ML
1500 SUSPENSION ORAL DAILY
Status: DISCONTINUED | OUTPATIENT
Start: 2024-01-23 | End: 2024-01-23

## 2024-01-22 RX ORDER — MIDAZOLAM HYDROCHLORIDE 1 MG/ML
INJECTION INTRAMUSCULAR; INTRAVENOUS
Status: DISCONTINUED | OUTPATIENT
Start: 2024-01-22 | End: 2024-01-22 | Stop reason: HOSPADM

## 2024-01-22 RX ORDER — LEVOFLOXACIN 5 MG/ML
750 INJECTION, SOLUTION INTRAVENOUS
Status: DISCONTINUED | OUTPATIENT
Start: 2024-01-22 | End: 2024-01-23

## 2024-01-22 RX ORDER — DEXMEDETOMIDINE HYDROCHLORIDE 4 UG/ML
0-1.4 INJECTION, SOLUTION INTRAVENOUS CONTINUOUS
Status: DISCONTINUED | OUTPATIENT
Start: 2024-01-22 | End: 2024-01-24

## 2024-01-22 RX ORDER — LIDOCAINE HYDROCHLORIDE 10 MG/ML
INJECTION, SOLUTION EPIDURAL; INFILTRATION; INTRACAUDAL; PERINEURAL
Status: DISCONTINUED | OUTPATIENT
Start: 2024-01-22 | End: 2024-01-22 | Stop reason: HOSPADM

## 2024-01-22 RX ORDER — BUPRENORPHINE 2 MG/1
4 TABLET SUBLINGUAL DAILY
Status: DISCONTINUED | OUTPATIENT
Start: 2024-01-22 | End: 2024-01-23

## 2024-01-22 RX ORDER — MORPHINE SULFATE 4 MG/ML
2 INJECTION, SOLUTION INTRAMUSCULAR; INTRAVENOUS EVERY 4 HOURS PRN
Status: COMPLETED | OUTPATIENT
Start: 2024-01-22 | End: 2024-01-22

## 2024-01-22 RX ADMIN — DEXMEDETOMIDINE HYDROCHLORIDE 1.4 MCG/KG/HR: 400 INJECTION INTRAVENOUS at 08:01

## 2024-01-22 RX ADMIN — LEVOFLOXACIN 750 MG: 750 INJECTION, SOLUTION INTRAVENOUS at 04:01

## 2024-01-22 RX ADMIN — SCOPOLAMINE 1 PATCH: 1 PATCH TRANSDERMAL at 01:01

## 2024-01-22 RX ADMIN — VANCOMYCIN HYDROCHLORIDE 1750 MG: 500 INJECTION, POWDER, LYOPHILIZED, FOR SOLUTION INTRAVENOUS at 12:01

## 2024-01-22 RX ADMIN — DEXMEDETOMIDINE HYDROCHLORIDE 1.4 MCG/KG/HR: 400 INJECTION INTRAVENOUS at 04:01

## 2024-01-22 RX ADMIN — MORPHINE SULFATE 2 MG: 4 INJECTION, SOLUTION INTRAMUSCULAR; INTRAVENOUS at 12:01

## 2024-01-22 RX ADMIN — DEXMEDETOMIDINE HYDROCHLORIDE 400 MCG: 400 INJECTION INTRAVENOUS at 01:01

## 2024-01-22 RX ADMIN — FAMOTIDINE 20 MG: 20 TABLET, FILM COATED ORAL at 09:01

## 2024-01-22 RX ADMIN — LORAZEPAM 2 MG: 2 INJECTION INTRAMUSCULAR; INTRAVENOUS at 10:01

## 2024-01-22 RX ADMIN — LORAZEPAM 0.5 MG: 0.5 TABLET ORAL at 12:01

## 2024-01-22 RX ADMIN — MORPHINE SULFATE 2 MG: 4 INJECTION, SOLUTION INTRAMUSCULAR; INTRAVENOUS at 03:01

## 2024-01-22 RX ADMIN — LIDOCAINE HYDROCHLORIDE 1 MG/MIN: 8 INJECTION, SOLUTION INTRAVENOUS at 12:01

## 2024-01-22 RX ADMIN — VANCOMYCIN HYDROCHLORIDE 1250 MG: 1.25 INJECTION, POWDER, LYOPHILIZED, FOR SOLUTION INTRAVENOUS at 07:01

## 2024-01-22 RX ADMIN — MORPHINE SULFATE 2 MG: 4 INJECTION, SOLUTION INTRAMUSCULAR; INTRAVENOUS at 09:01

## 2024-01-22 RX ADMIN — THIAMINE HCL TAB 100 MG 100 MG: 100 TAB at 09:01

## 2024-01-22 NOTE — ED NOTES
Bed: 33  Expected date:   Expected time:   Means of arrival:   Comments:  [facility] tx: multiple defibs with vtabh/vfib/torsades

## 2024-01-22 NOTE — ED PROVIDER NOTES
Encounter Date: 1/21/2024       History     Chief Complaint   Patient presents with    Seizures     Pt from [facility] per aasi with witness seizure like activity just prior to arrival.  Pt reports seizure hx has been linked to detoxing and pt reports he is detoxing from heroin currently.  Pt has a [employer] defibrillator and thinks he was shocked prior to his seizure.  Also c/o left thumb injury--swelling and discoloration noted.      Patient presents to the ER after possible seizure-like episodes.  Patient was states he was detox off heroin, he has been incarcerated for 2 days.  He reports last time he detox from heroin his heart stopped.  He has an AICD in place due to history of prolonged QT syndrome.  While receiving handoff from previous physician, patient had an episode of VFib and was cardioverted by his subcutaneous defibrillator.    The history is provided by the patient.     Review of patient's allergies indicates:   Allergen Reactions    Penicillins      Past Medical History:   Diagnosis Date    Cardiac arrest 08/2019    pulseless, AED required    Depression     Heart failure, type unknown     fr other facility medical record    Hepatitis C     HIV (human immunodeficiency virus infection)     HTN (hypertension)     IV drug user     Myocardial infarct, old     fr transfer medical records    Neuropathy     left foot    Overdose     heroine, meth    Pneumonia     Polysubstance abuse     heroin methadone    PTSD (post-traumatic stress disorder)     from prev medical record    Seizure 08/2019    POSSIBLE Seizure prior to cardiac arrest, unclear     Past Surgical History:   Procedure Laterality Date    CHEST TUBE INSERTION Bilateral     bilat when intubated in the past (removed)    CORONARY ANGIOGRAPHY N/A 8/30/2019    Procedure: ANGIOGRAM, CORONARY ARTERY;  Surgeon: Misbah Culp MD;  Location: [facility] CATH;  Service: Cardiology;  Laterality: N/A;    GASTROSTOMY TUBE PLACEMENT      fr other facility medical  record    INSERTION OF PACEMAKER  2007    LEFT HEART CATHETERIZATION Left 8/30/2019    Procedure: Left heart cath;  Surgeon: Misbah Culp MD;  Location: [facility] CATH;  Service: Cardiology;  Laterality: Left;    REMOVAL OF PACEMAKER  2014    TRACHEOSTOMY      x2 in the past (removed/healed as of August 2019)     No family history on file.  Social History     Tobacco Use    Smoking status: Every Day   Substance Use Topics    Drug use: Yes     Types: IV, Methamphetamines     Comment: heroine opiates     Review of Systems   Unable to perform ROS: Acuity of condition       Physical Exam     Initial Vitals   BP Pulse Resp Temp SpO2   01/21/24 1753 01/21/24 1753 01/21/24 1753 01/21/24 1935 01/21/24 1753   (!) 132/48 72 18 99.6 °F (37.6 °C) 98 %      MAP       --                Physical Exam    Nursing note and vitals reviewed.  Constitutional: He appears well-developed and well-nourished.   HENT:   Head: Normocephalic and atraumatic.   Eyes: Conjunctivae are normal. Pupils are equal, round, and reactive to light.   Neck: Neck supple.   Normal range of motion.  Cardiovascular:  Regular rhythm.           Bradycardic   Pulmonary/Chest: Breath sounds normal. No respiratory distress.   Abdominal: Abdomen is soft. There is no abdominal tenderness.   Musculoskeletal:         General: No edema. Normal range of motion.      Cervical back: Normal range of motion and neck supple.     Neurological: He is alert and oriented to person, place, and time.   Skin: Skin is warm and dry.         ED Course   Critical Care    Date/Time: 1/21/2024 8:44 PM    Performed by: Henrik Brower MD  Authorized by: Henrik Brower MD  Direct patient critical care time: 15 minutes  Additional history critical care time: 5 minutes  Ordering / reviewing critical care time: 4 minutes  Documentation critical care time: 6 minutes  Consulting other physicians critical care time: 6 minutes  Consult with family critical care time: 0 minutes  Other critical care  time: 0 minutes  Total critical care time (exclusive of procedural time) : 36 minutes  Critical care time was exclusive of separately billable procedures and treating other patients and teaching time.  Critical care was necessary to treat or prevent imminent or life-threatening deterioration of the following conditions: cardiac failure.  Critical care was time spent personally by me on the following activities: blood draw for specimens, development of treatment plan with patient or surrogate, discussions with consultants, interpretation of cardiac output measurements, evaluation of patient's response to treatment, examination of patient, obtaining history from patient or surrogate, ordering and performing treatments and interventions, ordering and review of laboratory studies, pulse oximetry, re-evaluation of patient's condition and review of old charts.        Labs Reviewed   COMPREHENSIVE METABOLIC PANEL - Abnormal; Notable for the following components:       Result Value    Carbon Dioxide 21 (*)     Glucose Level 105 (*)     Protein Total 9.1 (*)     Globulin 4.8 (*)     Albumin/Globulin Ratio 0.9 (*)     All other components within normal limits   CK - Abnormal; Notable for the following components:    Creatine Kinase 338 (*)     All other components within normal limits   MAGNESIUM - Normal   TROPONIN I - Normal   CK-MB - Normal   LACTIC ACID, PLASMA - Normal   COVID/FLU A&B PCR - Normal    Narrative:     The Xpert Xpress SARS-CoV-2/FLU/RSV plus is a rapid, multiplexed real-time PCR test intended for the simultaneous qualitative detection and differentiation of SARS-CoV-2, Influenza A, Influenza B, and respiratory syncytial virus (RSV) viral RNA in either nasopharyngeal swab or nasal swab specimens.         CBC W/ AUTO DIFFERENTIAL    Narrative:     The following orders were created for panel order CBC Auto Differential.  Procedure                               Abnormality         Status                      ---------                               -----------         ------                     CBC with Differential[[id number]]                           Final result                 Please view results for these tests on the individual orders.   CBC WITH DIFFERENTIAL   EXTRA TUBES    Narrative:     The following orders were created for panel order EXTRA TUBES.  Procedure                               Abnormality         Status                     ---------                               -----------         ------                     Light Blue Top Hold[[id number]]                             In process                 Gold Top Hold[[id number]]                                   In process                   Please view results for these tests on the individual orders.   LIGHT BLUE TOP HOLD   GOLD TOP HOLD   LACTIC ACID, PLASMA   POCT GLUCOSE     EKG Readings: (Independently Interpreted)   Initial Reading: No STEMI. Rhythm: Sinus Bradycardia. Ectopy: No Ectopy. Conduction: Normal. Axis: Normal.       Imaging Results    None          Medications   LIDOcaine 2000 mg in D5W 250 mL infusion (has no administration in time range)   ondansetron injection 4 mg (4 mg Intravenous Given 1/21/24 1845)   sodium chloride 0.9% bolus 2,000 mL 2,000 mL (2,000 mLs Intravenous New Bag 1/21/24 1907)   magnesium sulfate 2g in water 50mL IVPB (premix) (0 g Intravenous Stopped 1/21/24 2005)     Medical Decision Making  Patient was observed going into what appeared to be Vfib/torsades in the ER at least 4 times immediately cardioverted by his ICD.  After cardioversions, patient was awake and alert, bradycardic in the 40s with a normal blood pressure.  IV magnesium 2 g bolus was ordered.  [facility] was consulted, recommended starting a lidocaine drip at 1 mg.  No cardiology services available here, initiated transfer, accepted by Dr. Barboza to [facility].  Lab workup with no severe abnormalities.    Amount and/or Complexity of Data  Reviewed  Labs: ordered. Decision-making details documented in ED Course.  ECG/medicine tests: ordered and independent interpretation performed. Decision-making details documented in ED Course.    Risk  Prescription drug management.                                      Clinical Impression:  Final diagnoses:  [Z13.9] Screening due  [I49.01] Ventricular fibrillation (Primary)  [R94.31] Prolonged Q-T interval on ECG          ED Disposition Condition    Transfer to Another Facility Henrik Osborn MD  01/21/24 7190

## 2024-01-22 NOTE — NURSING
Nurses Note -- 4 Eyes      1/22/2024   4:12 AM      Skin assessed during: Admit      [x] No Altered Skin Integrity Present    []Prevention Measures Documented      [] Yes- Altered Skin Integrity Present or Discovered   [] LDA Added if Not in Epic (Describe Wound)   [] New Altered Skin Integrity was Present on Admit and Documented in LDA   [] Wound Image Taken    Wound Care Consulted? No    Attending Nurse:  SCOTTY Rayo    Second RN/Staff Member:   SCOTTY Dexter

## 2024-01-22 NOTE — PLAN OF CARE
pt will return to the [facility]  [facility]  at above  [phone] . [facility] called this am and asked for the DNR order to be faxed to him. He confirmed their plan is for pt to return there.   Nursing will need to call report to their health dept when pt is ready for dc

## 2024-01-22 NOTE — ED NOTES
Run of VTACH. Implanted Defibrillator shocked pt. Rhythm converted. Pt unresponsive for approximately 20 seconds. Awake at present. Dr. Brower at bs.

## 2024-01-22 NOTE — H&P
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 Patient recently incarcerated 2 days ago. Today he was found to be having seizure like activity in his cell and was brought to [facility] ED where he reportedly had 4 episodes of Vtach/vfib and was immediately cardioverted by his ICD. With subsequent shocks patient would apparently be awake  and alert but notably bradycardic. He was then transferred to [facility] due to the lack of cardiology services. Upon arrival to our ED patient again went into vtach and was shocked with return of normal circulation. Cardiology was consulted in the ED and they recommended he be started on Amiodarone. Additionally the ICD rep has been called out for interrogation of the device. Currently the patient denies any chest pain, shortness of breath, or nausea. However, he did experience these symptoms during the arthymmic events.     Hospital Course/Significant events:      24 Hour Interval History:  NA    Past Medical History:   Diagnosis Date    Cardiac arrest 08/2019    pulseless, AED required    Depression     Heart failure, type unknown     fr other facility medical record    Hepatitis C     HIV (human immunodeficiency virus infection)     HTN (hypertension)     IV drug user     Myocardial  infarct, old     fr transfer medical records    Neuropathy     left foot    Overdose     heroine, meth    Pneumonia     Polysubstance abuse     heroin methadone    PTSD (post-traumatic stress disorder)     from prev medical record    Seizure 08/2019    POSSIBLE Seizure prior to cardiac arrest, unclear       Past Surgical History:   Procedure Laterality Date    CHEST TUBE INSERTION Bilateral     bilat when intubated in the past (removed)    CORONARY ANGIOGRAPHY N/A 8/30/2019    Procedure: ANGIOGRAM, CORONARY ARTERY;  Surgeon: Misbah Culp MD;  Location: [facility] CATH;  Service: Cardiology;  Laterality: N/A;    GASTROSTOMY TUBE PLACEMENT      fr other facility medical record    INSERTION OF PACEMAKER  2007    LEFT HEART CATHETERIZATION Left 8/30/2019    Procedure: Left heart cath;  Surgeon: Misbah Culp MD;  Location: [facility] CATH;  Service: Cardiology;  Laterality: Left;    REMOVAL OF PACEMAKER  2014    TRACHEOSTOMY      x2 in the past (removed/healed as of August 2019)       Social History     Socioeconomic History    Marital status: Single   Tobacco Use    Smoking status: Every Day   Substance and Sexual Activity    Drug use: Yes     Types: IV, Methamphetamines     Comment: heroine opiates           Current Outpatient Medications   Medication Instructions    albuterol (PROVENTIL/VENTOLIN HFA) 90 mcg/actuation inhaler 1 puff, Inhalation, As needed (PRN)    BIKTARVY -25 mg per tablet 1 tablet, Oral, Daily    buprenorphine-naloxone (SUBOXONE) 8-2 mg Film 1 each, Sublingual, Daily    busPIRone (BUSPAR) 30 MG Tab 1 tablet, Oral, 2 times daily    lidocaine (LIDODERM) 5 % 1 patch, Transdermal, Daily    LORazepam (ATIVAN) 0.5 mg, Oral, 3 times daily PRN    thiamine 100 mg, Oral, Daily       Current Inpatient Medications   [START ON 1/22/2024] fagozwkso-bvaebobl-nhuadnn ala  1 tablet Oral Daily    [START ON 1/22/2024] enoxaparin  40 mg Subcutaneous Daily    [START ON 1/22/2024] thiamine  100 mg Oral Daily       Current  Intravenous Infusions        Review of Systems   Constitutional:  Negative for chills and fever.   Eyes:  Negative for double vision.   Respiratory:  Negative for cough and shortness of breath.    Cardiovascular:  Negative for chest pain, palpitations and orthopnea.   Gastrointestinal:  Negative for abdominal pain, diarrhea, nausea and vomiting.   Musculoskeletal:  Negative for back pain, myalgias and neck pain.   Neurological:  Negative for dizziness and headaches.          Objective:     No intake or output data in the 24 hours ending 01/21/24 2213      Vital Signs (Most Recent):  Temp: 98.8 °F (37.1 °C) (01/21/24 2101)  Pulse: (!) 53 (01/21/24 2204)  Resp: 19 (01/21/24 2204)  BP: (!) 97/53 (01/21/24 2204)  SpO2: 98 % (01/21/24 2204)  Body mass index is 19.9 kg/m².    Vital Signs (24h Range):  Temp:  [98.8 °F (37.1 °C)-99.6 °F (37.6 °C)] 98.8 °F (37.1 °C)  Pulse:  [44-72] 53  Resp:  [10-38] 19  SpO2:  [91 %-100 %] 98 %  BP: ()/(48-80) 97/53     Physical Exam  Vitals and nursing note reviewed.   Constitutional:       General: He is not in acute distress.     Appearance: He is normal weight. He is not ill-appearing or toxic-appearing.   HENT:      Head: Normocephalic and atraumatic.      Mouth/Throat:      Mouth: Mucous membranes are moist.      Pharynx: Oropharynx is clear.   Eyes:      Extraocular Movements: Extraocular movements intact.      Conjunctiva/sclera: Conjunctivae normal.      Pupils: Pupils are equal, round, and reactive to light.   Cardiovascular:      Rate and Rhythm: Regular rhythm. Bradycardia present.      Heart sounds: No murmur heard.  Pulmonary:      Effort: Pulmonary effort is normal. No respiratory distress.      Breath sounds: No wheezing or rhonchi.   Musculoskeletal:         General: No swelling.      Right lower leg: No edema.      Left lower leg: No edema.   Skin:     General: Skin is warm and dry.   Neurological:      General: No focal deficit present.      Mental Status: He is  "alert and oriented to person, place, and time.           Lines/Drains/Airways       Peripheral Intravenous Line  Duration                  Peripheral IV - Single Lumen 01/21/24 2115 22 G Anterior;Right Shoulder <1 day         Peripheral IV - Single Lumen 01/21/24 2119 18 G Anterior;Left Upper Arm <1 day                    Significant Labs:    Lab Results   Component Value Date    WBC 8.96 01/21/2024    HGB 14.9 01/21/2024    HCT 42.3 01/21/2024    MCV 83.8 01/21/2024     01/21/2024           BMP  Lab Results   Component Value Date     01/21/2024    K 4.1 01/21/2024    CHLORIDE 103 01/21/2024    CO2 21 (L) 01/21/2024    BUN 15.5 01/21/2024    CREATININE 0.89 01/21/2024    CALCIUM 9.9 01/21/2024    ESTGFRAFRICA >105 10/01/2021    EGFRNONAA >105 09/10/2021         ABG  No results for input(s): "PH", "PO2", "PCO2", "HCO3", "POCBASEDEF" in the last 168 hours.    Mechanical Ventilation Support:         Significant Imaging:  I have reviewed the pertinent imaging within the past 24 hours.        Assessment/Plan:     Assessment  VT storm  Prolonged QTc  Hx of MI 2007 with HFrEF s/p AICD  Hx of recurrent endocarditis with multiple AICD replacements (most recent 9/21)  Opioid use disorder (Heroin)  Thumb infection  Hx of Hep C, HIV, R PCA mycotic aneurysm, R parieto-occipital ICH      Plan  1.Cardiology consulted   2. AICD rep came to see patient. Device interrogated. Noted to be functional. Has an interval of 18s before shock is given. If episodes last longer than 18 seconds we will need to shock.  2. Will get ECHO  3. S/p 2 g mag and started on amiodarone gtt. Continue this  4. Mag/potassium levels q6. Daily CMP and monitor LFT on amio. Trend troponin x2  5. Will consider treating for withdrawal  7. XR of thumb and vanc  6. Patient electing to be DNR status.   I have spoken with the patient at length today about the patient's CODE STATUS.  I have fully informed him of the risks and benefits of CPR and mechanical " ventilation.  We have also discussed the differences between FULL CODE, DNR/DNI and DNR. He, without coercion or bias, made a decision to make     Britton Perez    CODE STATUS: DNR; status confirmed/order placed in chart     DVT Prophylaxis:Lovenox  GI Prophylaxis:famotidine     32 minutes of critical care was time spent personally by me on the following activities: development of treatment plan with patient or surrogate and bedside caregivers, discussions with consultants, evaluation of patient's response to treatment, examination of patient, ordering and performing treatments and interventions, ordering and review of laboratory studies, ordering and review of radiographic studies, pulse oximetry, re-evaluation of patient's condition.  This critical care time did not overlap with that of any other provider or involve time for any procedures.     Raz Musa DO  Pulmonary Critical Care Medicine  [facility] - Emergency Dept  DOS: 01/21/2024

## 2024-01-22 NOTE — ED PROVIDER NOTES
Encounter Date: 1/21/2024    SCRIBE #1 NOTE: I, Odilia Olguin, am scribing for, and in the presence of,  Jung Jonas MD. I have scribed the following portions of the note - Other sections scribed: HPI, ROS, PE.       History     Chief Complaint   Patient presents with    Chest Pain     Transfer from [facility] with runs of V-fib,V-tach w/ defib shocks     33 year old male with history of depression, heart failure, cardiac arrest, polysubstance abuse, HIV, HTN, MI, hepatitis C, seizure, prolongued QT interval, and a defibrillator presents to the ED as a transfer from [facility] for vfib and vtach. Per transfer records, pt has been incarcerated for the last 2 days and began having seizure-like activity while in retirement today. Pt was brought to [facility] and was having runs of vfib and vtach. Pt was given 2 g of mg and was not given amiodarone because his heart rate was too low. He was not given lidocaine because the pharmacy did not have any ready. Upon entering pt's room here, pt was in vtach and immediately was shocked. Pt had an immediate return of regular circulation. Pt reports that he has a [employer] defibrillator and thinks the battery is low because it has not been shocking him. He notes that he has these symptoms when he is detoxing from heroin.  He has a history of endocarditis from heroin usage but notes that he has been cleared from this. Pt complains of chest pain and states that he is not on suboxone.     The history is provided by the patient. No  was used.     Review of patient's allergies indicates:   Allergen Reactions    Penicillins      Past Medical History:   Diagnosis Date    Cardiac arrest 08/2019    pulseless, AED required    Depression     Heart failure, type unknown     fr other facility medical record    Hepatitis C     HIV (human immunodeficiency virus infection)     HTN (hypertension)     IV drug user     Myocardial infarct, old     fr transfer medical records    Neuropathy      left foot    Overdose     heroine, meth    Pneumonia     Polysubstance abuse     heroin methadone    PTSD (post-traumatic stress disorder)     from prev medical record    Seizure 08/2019    POSSIBLE Seizure prior to cardiac arrest, unclear     Past Surgical History:   Procedure Laterality Date    CHEST TUBE INSERTION Bilateral     bilat when intubated in the past (removed)    CORONARY ANGIOGRAPHY N/A 8/30/2019    Procedure: ANGIOGRAM, CORONARY ARTERY;  Surgeon: Misbah Culp MD;  Location: [facility] CATH;  Service: Cardiology;  Laterality: N/A;    GASTROSTOMY TUBE PLACEMENT      fr other facility medical record    INSERTION OF PACEMAKER  2007    INSERTION, PACEMAKER, TEMPORARY TRANSVENOUS Right 1/22/2024    Procedure: Insertion, Pacemaker, Temporary Transvenous;  Surgeon: Thad Interiano MD;  Location: [facility] CATH LAB;  Service: Cardiology;  Laterality: Right;    LEFT HEART CATHETERIZATION Left 8/30/2019    Procedure: Left heart cath;  Surgeon: Misbah Culp MD;  Location: [facility] CATH;  Service: Cardiology;  Laterality: Left;    REMOVAL OF PACEMAKER  2014    TRACHEOSTOMY      x2 in the past (removed/healed as of August 2019)     No family history on file.  Social History     Tobacco Use    Smoking status: Every Day   Substance Use Topics    Drug use: Yes     Types: IV, Methamphetamines     Comment: heroine opiates     Review of Systems   Cardiovascular:  Positive for chest pain.       Physical Exam     Initial Vitals [01/21/24 2101]   BP Pulse Resp Temp SpO2   125/60 65 (!) 22 98.8 °F (37.1 °C) 100 %      MAP       --         Physical Exam    Nursing note and vitals reviewed.  Constitutional: He appears well-developed.   Ill appearing    HENT:   Head: Normocephalic and atraumatic.   Mouth/Throat: Oropharynx is clear and moist.   Eyes: Conjunctivae and EOM are normal. Pupils are equal, round, and reactive to light.   Neck: Neck supple.   Cardiovascular:  Normal rate and regular rhythm.           No murmur  heard.  Pulmonary/Chest: Breath sounds normal. No respiratory distress. He exhibits no tenderness.   Defibrillator to left chest   Abdominal: Abdomen is soft. Bowel sounds are normal. He exhibits no distension. There is no abdominal tenderness.   Musculoskeletal:         General: Normal range of motion.      Cervical back: Neck supple.      Lumbar back: Normal. No tenderness. Normal range of motion.     Neurological: He is alert and oriented to person, place, and time. He has normal strength. No cranial nerve deficit or sensory deficit.   Skin: There is pallor.   Psychiatric: He has a normal mood and affect. Judgment normal.         ED Course   Critical Care    Date/Time: 1/21/2024 9:24 PM    Performed by: Jung Jonas MD  Authorized by: Jung Jonas MD  Direct patient critical care time: 45 minutes  Total critical care time (exclusive of procedural time) : 45 minutes  Critical care time was exclusive of separately billable procedures and treating other patients and teaching time.  Critical care was necessary to treat or prevent imminent or life-threatening deterioration of the following conditions: cardiac failure.  Critical care was time spent personally by me on the following activities: development of treatment plan with patient or surrogate, discussions with consultants, interpretation of cardiac output measurements, evaluation of patient's response to treatment, examination of patient, obtaining history from patient or surrogate, ordering and performing treatments and interventions, ordering and review of laboratory studies, ordering and review of radiographic studies, pulse oximetry, re-evaluation of patient's condition and review of old charts.        Labs Reviewed   TSH - Abnormal; Notable for the following components:       Result Value    TSH 0.288 (*)     All other components within normal limits   T4, FREE - Normal   DRUG SCREEN, URINE (BEAKER)        ECG Results              EKG  12-lead (Final result)  Result time 01/22/24 11:04:26      Final result by Interface, Lab In [facility] (01/22/24 11:04:26)                   Narrative:    Test Reason : R07.9,    Vent. Rate : 049 BPM     Atrial Rate : 049 BPM     P-R Int : 120 ms          QRS Dur : 080 ms      QT Int : 498 ms       P-R-T Axes : 015 085 083 degrees     QTc Int : 449 ms    Sinus bradycardia  Septal infarct ,age undetermined  ST and T wave abnormality, consider inferior ischemia  ST and T wave abnormality, consider anterior ischemia  Prolonged QT  Abnormal ECG  Confirmed by Matteo Wheeler MD ([id number]) on 1/22/2024 11:04:12 AM    Referred By: LYNNE RUIZ           Confirmed By:Matteo Wheeler MD                                  Imaging Results              X-Ray Chest 1 View (Final result)  Result time 01/22/24 08:24:03      Final result by Lewis Malhotra MD (01/22/24 08:24:03)                   Impression:      No significant abnormalities      Electronically signed by: Lewis Malhotra  Date:    01/22/2024  Time:    08:24               Narrative:    EXAMINATION:  XR CHEST 1 VIEW    CPT 80532    CLINICAL HISTORY:  lateral to look at icd;    FINDINGS:  Only lateral projection is provided lung fields appear to be clear and free of gross infiltrates atelectasis or effusions.  ICD device is identified                                       Medications   thiamine tablet 100 mg (100 mg Oral Given 1/24/24 0905)   sodium chloride 0.9% flush 10 mL (has no administration in time range)   enoxaparin injection 40 mg (40 mg Subcutaneous Not Given 1/24/24 1622)   potassium chloride 10 mEq in 100 mL IVPB (0 mEq Intravenous Stopped 1/25/24 0038)   magnesium sulfate 2g in water 50mL IVPB (premix) (2 g Intravenous New Bag 1/25/24 0554)   famotidine tablet 20 mg (20 mg Oral Given 1/24/24 2001)   LIDOcaine 2000 mg in D5W 250 mL infusion (0 mg/min Intravenous Stopped 1/24/24 0911)   LIDOcaine 4 % cream ( Topical (Top) Not Given 1/22/24 0330)   buprenorphine  HCL SL tablet 8 mg (8 mg Sublingual Given 1/24/24 0905)   busPIRone tablet 30 mg (30 mg Oral Given 1/24/24 2001)   traZODone tablet 100 mg (100 mg Oral Given 1/24/24 2002)   mupirocin 2 % ointment ( Nasal Not Given 1/24/24 2100)   sulfamethoxazole-trimethoprim 800-160mg per tablet 1 tablet (1 tablet Oral Given 1/24/24 2002)   emtricitabine-tenofovir alafen 200-25 mg Tab 1 tablet (1 tablet Oral Given 1/24/24 1621)   dolutegravir Tab 50 mg (50 mg Oral Given 1/24/24 1621)   ALPRAZolam tablet 0.25 mg (0.25 mg Oral Given 1/24/24 2001)   calcium carbonate 200 mg calcium (500 mg) chewable tablet 500 mg (has no administration in time range)   bismuth subsalicylate 262 mg/15 mL suspension 30 mL (has no administration in time range)   potassium chloride 20 mEq in 100 mL IVPB (FOR CENTRAL LINE ADMINISTRATION ONLY) (20 mEq Intravenous New Bag 1/25/24 0623)   morphine injection 4 mg (4 mg Intravenous Given 1/21/24 2123)   ondansetron injection 4 mg (4 mg Intravenous Given 1/21/24 2124)   amiodarone injection 150 mg (150 mg Intravenous Given 1/21/24 2115)   amiodarone 360 mg/200 mL (1.8 mg/mL) infusion (0 mg/min Intravenous Stopped 1/22/24 0010)   metoclopramide injection 10 mg (10 mg Intramuscular Given 1/21/24 2234)   scopolamine 1.3-1.5 mg (1 mg over 3 days) 1 patch (1 patch Transdermal Patch Removed 1/24/24 2000)   morphine injection 2 mg (2 mg Intravenous Given 1/22/24 0919)   vancomycin 1.75 g in 5 % dextrose 500 mL IVPB (0 mg Intravenous Stopped 1/22/24 1408)   LORazepam injection 2 mg (2 mg Intravenous Given 1/22/24 1037)   lactated ringers bolus 1,000 mL (0 mLs Intravenous Stopped 1/23/24 1321)   buprenorphine HCL SL tablet 4 mg (4 mg Sublingual Given 1/23/24 1220)   ketorolac injection 30 mg (30 mg Intravenous Given 1/23/24 2120)   gabapentin capsule 300 mg (300 mg Oral Given 1/23/24 2319)   lactated ringers bolus 1,000 mL (0 mLs Intravenous Stopped 1/24/24 1006)   ketorolac injection 30 mg (30 mg Intravenous Given  1/24/24 1732)   diphenhydrAMINE injection 6.25 mg (6.25 mg Intravenous Given 1/24/24 1732)   simethicone chewable tablet 80 mg (80 mg Oral Given 1/24/24 2115)   lactated ringers bolus 1,000 mL ( Intravenous Restarted 1/24/24 2248)   prochlorperazine injection Soln 2.5 mg (2.5 mg Intravenous Given 1/24/24 2338)   diphenhydrAMINE injection 25 mg (25 mg Intravenous Given 1/24/24 2338)   nitroGLYCERIN SL tablet 0.4 mg (0.4 mg Sublingual Given 1/25/24 0336)   ketorolac injection 15 mg (15 mg Intravenous Given 1/25/24 0605)     Medical Decision Making  Amount and/or Complexity of Data Reviewed  External Data Reviewed: notes.     Details: Per transfer records, pt has been incarcerated for the last 2 days and began having seizure-like activity while in penitentiary today. Pt was [facility] to [facility] and was having runs of vfib and vtach. Pt was given 2 g of mg and was not given amiodarone because his heart rate was too low. He was not given lidocaine because the pharmacy did not have any ready  Labs: ordered.  Radiology: ordered.  ECG/medicine tests: ordered.    Risk  Prescription drug management.  Decision regarding hospitalization.              Attending Attestation:           Physician Attestation for Scribe:  Physician Attestation Statement for Scribe #1: I, Jung Jonas MD, reviewed documentation, as scribed by Odilia Olguin in my presence, and it is both accurate and complete.             ED Course as of 01/25/24 0706   Sun Jan 21, 2024 2133 Paged cardiology and ICU [MM]   2148 Carl with [facility] says that he is ok with amiodarone and that he was not aware that pt hadn't had lidocaine. He says to call Dr Dove if pt has any further events  [TB]   2649 Paged ICU  [TB]      ED Course User Index  [MM] Christy Victoria  [TB] Odilia Olguin                           Clinical Impression:  Final diagnoses:  [I47.20] VT (ventricular tachycardia)          ED Disposition Condition    Admit                 Jung Jonas,  MD  01/25/24 0706

## 2024-01-22 NOTE — PROGRESS NOTES
"Pharmacokinetic Initial Assessment: IV Vancomycin    Assessment/Plan:    Initiate intravenous vancomycin with loading dose of 1750 mg once followed by a maintenance dose of vancomycin 1250mg IV every 8 hours  Desired empiric serum trough concentration is 15 to 20 mcg/mL  Draw vancomycin trough level 60 min prior to fourth dose on 1/23 at approximately 1000  Pharmacy will continue to follow and monitor vancomycin.      Please contact pharmacy at extension [phone] with any questions regarding this assessment.     Thank you for the consult,   Florin Jennings       Patient brief summary:  Britton Perez is a 33 y.o. male initiated on antimicrobial therapy with IV Vancomycin for treatment of suspected skin & soft tissue infection    Drug Allergies:   Review of patient's allergies indicates:   Allergen Reactions    Penicillins        Actual Body Weight:   70.3 kg    Renal Function:   Estimated Creatinine Clearance: 144.4 mL/min (A) (based on SCr of 0.71 mg/dL (L)).,     Dialysis Method (if applicable):  N/A    CBC (last 72 hours):  Recent Labs   Lab Result Units 01/21/24  1923 01/22/24  0119   WBC x10(3)/mcL 8.96 8.60   Hgb g/dL 14.9 13.4*   Hct % 42.3 38.5*   Platelet x10(3)/mcL 202 164   Mono % % 2.1 2.3   Eos % % 0.0 0.0   Basophil % % 0.1 0.1       Metabolic Panel (last 72 hours):  Recent Labs   Lab Result Units 01/21/24  1923 01/22/24  0119   Sodium Level mmol/L 138 139   Potassium Level mmol/L 4.1 3.8   Chloride mmol/L 103 107   Carbon Dioxide mmol/L 21* 20*   Glucose Level mg/dL 105* 110*   Blood Urea Nitrogen mg/dL 15.5 14.5   Creatinine mg/dL 0.89 0.71*   Albumin Level g/dL 4.3 3.6   Bilirubin Total mg/dL 0.6 0.4   Alkaline Phosphatase unit/L 91 79   Aspartate Aminotransferase unit/L 20 13   Alanine Aminotransferase unit/L 8 7   Magnesium Level mg/dL 2.20 2.30   Phosphorus Level mg/dL  --  2.4       Drug levels (last 3 results):  No results for input(s): "VANCOMYCINRA", "VANCORANDOM", "VANCOMYCINPE", "VANCOPEAK", " ""VANCOMYCINTR", "VANCOTROUGH" in the last 72 hours.    Microbiologic Results:  Microbiology Results (last 7 days)       Procedure Component Value Units Date/Time    Blood Culture [[id number]] Collected: 01/22/24 0914    Order Status: Sent Specimen: Arterial Blood Line     Blood Culture [[id number]]     Order Status: Sent Specimen: Blood from Arm, Right             "

## 2024-01-22 NOTE — ED NOTES
Patient rhythm vfib, pt went unconscious, internal defibrillator fired, patient rhythm converted to sinus rhythm. Dr. Perera at bedside.

## 2024-01-22 NOTE — PROCEDURES
"Britton Perez is a 33 y.o. male patient.    Temp: 98.6 °F (37 °C) (24 0000)  Pulse: (!) 45 (24 0100)  Resp: 11 (24)  BP: 128/68 (240)  SpO2: 99 % (24)  Weight: 70.3 kg (155 lb) (24)  Height: 6' 2.02" (188 cm) (24)       Arterial Line    Date/Time: 2024 1:11 AM  Location procedure was performed: [facility]    Performed by: Raz Musa DO  Authorized by: Raz Musa DO  Pre-op Diagnosis: v tach storm  Post-operative diagnosis: v tach storm  Consent Done: Yes  Consent: Verbal consent obtained.  Risks and benefits: risks, benefits and alternatives were discussed  Consent given by: patient  Patient understanding: patient states understanding of the procedure being performed  Patient consent: the patient's understanding of the procedure matches consent given  Procedure consent: procedure consent matches procedure scheduled  Relevant documents: relevant documents present and verified  Patient identity confirmed: , MRN, name and verbally with patient  Time out: Immediately prior to procedure a "time out" was called to verify the correct patient, procedure, equipment, support staff and site/side marked as required.  Preparation: Patient was prepped and draped in the usual sterile fashion.  Indications: hemodynamic monitoring  Location: left radial    Anesthesia:  Local Anesthetic: lidocaine 1% without epinephrine  Anesthetic total: 3 mL    Patient sedated: no  Needle gauge: 20  Seldinger technique: Seldinger technique used  Number of attempts: 2  Complications: No  Estimated blood loss (mL): 4  Specimens: No  Implants: No  Post-procedure: dressing applied  Post-procedure CMS: normal  Patient tolerance: Patient tolerated the procedure well with no immediate complications          2024    "

## 2024-01-22 NOTE — CONSULTS
Inpatient consult to Cardiology  Consult performed by: Lawson Castelan FNP  Consult ordered by: Raz Musa DO  Reason for consult: VF/VT Arrests Requiring Shocks        [facility] - [facility]    Cardiology  Consult Note    Patient Name: Britton Perez  MRN: [id number]  Admission Date: 1/21/2024  Hospital Length of Stay: 1 days  Code Status: DNR   Attending Provider: Cami Gallardo MD   Consulting Provider: NESSA Mabry  Primary Care Physician: Kike Muller MD  Principal Problem:VT (ventricular tachycardia)    Patient information was obtained from patient, past medical records, ER records, and primary team.     Subjective:   Consultation Reason: VT/VF Arrest Requiring ICD Shock    HPI:   Mr. Perez is a 33 year old male, unknown to [facility], who presented to the hospital from nursing home after having been found down with seizure like activity. He was brought to [facility] ED where he reportedly had 4 episodes of Vtach/vfib and was immediately cardioverted by his ICD. With subsequent shocks patient would apparently be awake  and alert but notably bradycardic. He was then transferred to [facility] due to the lack of cardiology services. When he arrived at [facility] he went into VT yet again and was shocked by his device. [facility] was consulted and recommended amiodarone infusion. He is admitted to ICU for close monitoring. [facility] is consulted for cardiac evaluation/management.    PMH: Hypertension, Hepatitis C, HIV, Polysubstance Abuse, PTSD, Seizures, Neuropathy, Old MI, Depression, Cardiac Arrest/ICD, NICMO  PSH:  LHC, SQ ICD, Device Placement & Removal, Tracheostomy with Removal, Chest Tube Insertion/Removal  Family History: None  Social History: Tobacco- Active Use, Positive for Polysubstance Abuse- IV Methamphetamine Use, Alcohol- Negative    Previous Cardiac Diagnostics:   Echocardiogram (1.22.24):  Left Ventricle: The left ventricle is normal in size. Normal wall thickness. Normal wall motion. There is normal systolic  function with a visually estimated ejection fraction of 55 - 60%. There is normal diastolic function.  Right Ventricle: Normal right ventricular cavity size. Systolic function is normal. TAPSE is 2.04 cm.    Echocardiogram (8.15.21):  RVSP ~ 30mmHg   Normal left ventricle cavity size. Normal wall thickness. Mild to   moderately (40-45%) decreased ejection fraction.   Moderate mitral regurgitation.   Mild to moderate tricuspid regurgitation. Mild pulmonary hypertension present.     Coronary Angiogram (8.30.19):  Angiographically normal coronary arteries.  LV end diastolic pressure is normal.    Review of patient's allergies indicates:   Allergen Reactions    Penicillins        Current Facility-Administered Medications on File Prior to Encounter   Medication    [COMPLETED] magnesium sulfate 2g in water 50mL IVPB (premix)    [COMPLETED] ondansetron injection 4 mg    [COMPLETED] sodium chloride 0.9% bolus 2,000 mL 2,000 mL    [DISCONTINUED] LIDOcaine 2000 mg in D5W 250 mL infusion     Current Outpatient Medications on File Prior to Encounter   Medication Sig    albuterol (PROVENTIL/VENTOLIN HFA) 90 mcg/actuation inhaler Inhale 1 puff into the lungs as needed.    BIKTARVY -25 mg per tablet Take 1 tablet by mouth once daily.    buprenorphine-naloxone (SUBOXONE) 8-2 mg Film Place 1 each under the tongue once daily.     busPIRone (BUSPAR) 30 MG Tab Take 1 tablet by mouth 2 (two) times daily.    lidocaine (LIDODERM) 5 % Place 1 patch onto the skin once daily.    LORazepam (ATIVAN) 0.5 MG tablet Take 1 tablet (0.5 mg total) by mouth 3 (three) times daily as needed (withdrawal symptoms).    thiamine 100 MG tablet Take 1 tablet (100 mg total) by mouth once daily.     Review of Systems   Respiratory:  Negative for chest tightness and shortness of breath.    Gastrointestinal:  Positive for nausea.       Objective:     Vital Signs (Most Recent):  Temp: 98.6 °F (37 °C) (01/22/24 0000)  Pulse: (!) 56 (01/22/24 0500)  Resp: 12  (01/22/24 0500)  BP: 118/61 (01/22/24 0500)  SpO2: 96 % (01/22/24 0500) Vital Signs (24h Range):  Temp:  [98.6 °F (37 °C)-99.6 °F (37.6 °C)] 98.6 °F (37 °C)  Pulse:  [44-72] 56  Resp:  [9-38] 12  SpO2:  [91 %-100 %] 96 %  BP: ()/(48-80) 118/61  Arterial Line BP: (128-154)/(50-65) 128/50     Weight: 70.3 kg (155 lb)  Body mass index is 19.89 kg/m².    SpO2: 96 %         Intake/Output Summary (Last 24 hours) at 1/22/2024 0819  Last data filed at 1/22/2024 0017  Gross per 24 hour   Intake 96.04 ml   Output --   Net 96.04 ml       Lines/Drains/Airways       Arterial Line  Duration             Arterial Line 01/22/24 0101 Left Radial <1 day              Peripheral Intravenous Line  Duration                  Peripheral IV - Single Lumen 01/22/24 0345 20 G Anterior;Left Forearm <1 day         Peripheral IV - Single Lumen 01/22/24 0354 20 G Anterior;Right Forearm <1 day                    Significant Labs:  Recent Results (from the past 72 hour(s))   COVID/FLU A&B PCR    Collection Time: 01/21/24  6:57 PM   Result Value Ref Range    Influenza A PCR Not Detected Not Detected    Influenza B PCR Not Detected Not Detected    SARS-CoV-2 PCR Not Detected Not Detected, Negative   POCT glucose    Collection Time: 01/21/24  7:19 PM   Result Value Ref Range    POCT Glucose 104 70 - 110 mg/dL   Comprehensive metabolic panel    Collection Time: 01/21/24  7:23 PM   Result Value Ref Range    Sodium Level 138 136 - 145 mmol/L    Potassium Level 4.1 3.5 - 5.1 mmol/L    Chloride 103 98 - 107 mmol/L    Carbon Dioxide 21 (L) 22 - 29 mmol/L    Glucose Level 105 (H) 74 - 100 mg/dL    Blood Urea Nitrogen 15.5 8.9 - 20.6 mg/dL    Creatinine 0.89 0.73 - 1.18 mg/dL    Calcium Level Total 9.9 8.4 - 10.2 mg/dL    Protein Total 9.1 (H) 6.4 - 8.3 gm/dL    Albumin Level 4.3 3.5 - 5.0 g/dL    Globulin 4.8 (H) 2.4 - 3.5 gm/dL    Albumin/Globulin Ratio 0.9 (L) 1.1 - 2.0 ratio    Bilirubin Total 0.6 <=1.5 mg/dL    Alkaline Phosphatase 91 40 - 150  unit/L    Alanine Aminotransferase 8 0 - 55 unit/L    Aspartate Aminotransferase 20 5 - 34 unit/L    eGFR >60 mls/min/1.73/m2   Magnesium    Collection Time: 01/21/24  7:23 PM   Result Value Ref Range    Magnesium Level 2.20 1.60 - 2.60 mg/dL   Troponin I    Collection Time: 01/21/24  7:23 PM   Result Value Ref Range    Troponin-I <0.010 0.000 - 0.045 ng/mL   CK-MB    Collection Time: 01/21/24  7:23 PM   Result Value Ref Range    Creatine Kinase MB 4.3 <=7.2 ng/mL   CK    Collection Time: 01/21/24  7:23 PM   Result Value Ref Range    Creatine Kinase 338 (H) 30 - 200 U/L   Lactic acid, plasma    Collection Time: 01/21/24  7:23 PM   Result Value Ref Range    Lactic Acid Level 2.1 0.5 - 2.2 mmol/L   CBC with Differential    Collection Time: 01/21/24  7:23 PM   Result Value Ref Range    WBC 8.96 4.50 - 11.50 x10(3)/mcL    RBC 5.05 4.70 - 6.10 x10(6)/mcL    Hgb 14.9 14.0 - 18.0 g/dL    Hct 42.3 42.0 - 52.0 %    MCV 83.8 80.0 - 94.0 fL    MCH 29.5 27.0 - 31.0 pg    MCHC 35.2 33.0 - 36.0 g/dL    RDW 12.8 11.5 - 17.0 %    Platelet 202 130 - 400 x10(3)/mcL    MPV 9.8 7.4 - 10.4 fL    Neut % 85.7 %    Lymph % 11.9 %    Mono % 2.1 %    Eos % 0.0 %    Basophil % 0.1 %    Lymph # 1.07 0.6 - 4.6 x10(3)/mcL    Neut # 7.67 2.1 - 9.2 x10(3)/mcL    Mono # 0.19 0.1 - 1.3 x10(3)/mcL    Eos # 0.00 0 - 0.9 x10(3)/mcL    Baso # 0.01 <=0.2 x10(3)/mcL    IG# 0.02 0 - 0.04 x10(3)/mcL    IG% 0.2 %    NRBC% 0.0 %   TSH    Collection Time: 01/21/24  7:23 PM   Result Value Ref Range    TSH 0.288 (L) 0.350 - 4.940 uIU/mL   T4, Free    Collection Time: 01/21/24  7:23 PM   Result Value Ref Range    Thyroxine Free 1.16 0.70 - 1.48 ng/dL   Hepatitis C Antibody    Collection Time: 01/21/24  7:23 PM   Result Value Ref Range    Hep C Ab Interp Reactive (A) Nonreactive   Light Blue Top Hold    Collection Time: 01/21/24  7:27 PM   Result Value Ref Range    Extra Tube Hold for add-ons.    Gold Top Hold    Collection Time: 01/21/24  7:28 PM   Result Value  Ref Range    Extra Tube Hold for add-ons.    Echo Saline Bubble? No    Collection Time: 01/22/24 12:35 AM   Result Value Ref Range    BSA 1.92 m2    Suero's Biplane MOD Ejection Fraction 58 %    LVIDd 4.80 3.5 - 6.0 cm    LV Systolic Volume 41.00 mL    LV Systolic Volume Index 21.0 mL/m2    LVIDs 3.20 2.1 - 4.0 cm    LV Diastolic Volume 108.00 mL    LV Diastolic Volume Index 55.38 mL/m2    IVS 0.80 0.6 - 1.1 cm    FS 33 28 - 44 %    Left Ventricle Relative Wall Thickness 0.46 cm    Posterior Wall 1.10 0.6 - 1.1 cm    LV mass 158.82 g    LV Mass Index 81 g/m2    MV Peak E Freddie 0.91 m/s    TDI LATERAL 0.11 m/s    TDI SEPTAL 0.11 m/s    E/E' ratio 8.27 m/s    MV Peak A Freddie 0.54 m/s    TR Max Freddie 1.33 m/s    E/A ratio 1.69     E wave deceleration time 214.00 msec    LV SEPTAL E/E' RATIO 8.27 m/s    LV LATERAL E/E' RATIO 8.27 m/s    LVOT peak freddie 0.73 m/s    Left Ventricular Outflow Tract Mean Velocity 0.46 cm/s    Left Ventricular Outflow Tract Mean Gradient 1.00 mmHg    TAPSE 2.04 cm    LA size 3.20 cm    LA volume (mod) 37.80 cm3    LA Volume Index (Mod) 19.4 mL/m2    AV mean gradient 5 mmHg    AV peak gradient 9 mmHg    Ao peak freddie 1.50 m/s    Ao VTI 28.70 cm    LVOT peak VTI 14.80 cm    AV Velocity Ratio 0.49     AV index (prosthetic) 0.52     Triscuspid Valve Regurgitation Peak Gradient 7 mmHg    Mean e' 0.11 m/s    ZLVIDS -0.52     ZLVIDD -1.47    Magnesium    Collection Time: 01/22/24  1:19 AM   Result Value Ref Range    Magnesium Level 2.30 1.60 - 2.60 mg/dL   Troponin I    Collection Time: 01/22/24  1:19 AM   Result Value Ref Range    Troponin-I <0.010 0.000 - 0.045 ng/mL   Phosphorus    Collection Time: 01/22/24  1:19 AM   Result Value Ref Range    Phosphorus Level 2.4 2.3 - 4.7 mg/dL   Comprehensive Metabolic Panel    Collection Time: 01/22/24  1:19 AM   Result Value Ref Range    Sodium Level 139 136 - 145 mmol/L    Potassium Level 3.8 3.5 - 5.1 mmol/L    Chloride 107 98 - 107 mmol/L    Carbon Dioxide 20 (L)  22 - 29 mmol/L    Glucose Level 110 (H) 74 - 100 mg/dL    Blood Urea Nitrogen 14.5 8.9 - 20.6 mg/dL    Creatinine 0.71 (L) 0.73 - 1.18 mg/dL    Calcium Level Total 8.8 8.4 - 10.2 mg/dL    Protein Total 7.1 6.4 - 8.3 gm/dL    Albumin Level 3.6 3.5 - 5.0 g/dL    Globulin 3.5 2.4 - 3.5 gm/dL    Albumin/Globulin Ratio 1.0 (L) 1.1 - 2.0 ratio    Bilirubin Total 0.4 <=1.5 mg/dL    Alkaline Phosphatase 79 40 - 150 unit/L    Alanine Aminotransferase 7 0 - 55 unit/L    Aspartate Aminotransferase 13 5 - 34 unit/L    eGFR >60 mls/min/1.73/m2   CBC with Differential    Collection Time: 01/22/24  1:19 AM   Result Value Ref Range    WBC 8.60 4.50 - 11.50 x10(3)/mcL    RBC 4.59 (L) 4.70 - 6.10 x10(6)/mcL    Hgb 13.4 (L) 14.0 - 18.0 g/dL    Hct 38.5 (L) 42.0 - 52.0 %    MCV 83.9 80.0 - 94.0 fL    MCH 29.2 27.0 - 31.0 pg    MCHC 34.8 33.0 - 36.0 g/dL    RDW 12.8 11.5 - 17.0 %    Platelet 164 130 - 400 x10(3)/mcL    MPV 10.1 7.4 - 10.4 fL    Neut % 85.9 %    Lymph % 11.5 %    Mono % 2.3 %    Eos % 0.0 %    Basophil % 0.1 %    Lymph # 0.99 0.6 - 4.6 x10(3)/mcL    Neut # 7.38 2.1 - 9.2 x10(3)/mcL    Mono # 0.20 0.1 - 1.3 x10(3)/mcL    Eos # 0.00 0 - 0.9 x10(3)/mcL    Baso # 0.01 <=0.2 x10(3)/mcL    IG# 0.02 0 - 0.04 x10(3)/mcL    IG% 0.2 %    NRBC% 0.0 %       Significant Imaging:  Imaging Results              X-Ray Chest 1 View (In process)                   EKG:        Telemetry:  SB    Physical Exam  Vitals and nursing note reviewed.   Constitutional:       Appearance: Normal appearance. He is ill-appearing.   HENT:      Head: Normocephalic.      Mouth/Throat:      Mouth: Mucous membranes are moist.      Pharynx: Oropharynx is clear.   Cardiovascular:      Rate and Rhythm: Regular rhythm. Bradycardia present.      Heart sounds: Normal heart sounds.   Pulmonary:      Effort: Pulmonary effort is normal. No respiratory distress.      Breath sounds: Normal breath sounds. No wheezing or rales.   Abdominal:      Palpations: Abdomen is  soft.   Musculoskeletal:         General: Normal range of motion.      Cervical back: Neck supple.      Right lower leg: No edema.      Left lower leg: No edema.   Skin:     General: Skin is warm and dry.   Neurological:      General: No focal deficit present.      Mental Status: He is alert and oriented to person, place, and time. Mental status is at baseline.   Psychiatric:         Behavior: Behavior normal.       Home Medications:   Current Facility-Administered Medications on File Prior to Encounter   Medication Dose Route Frequency Provider Last Rate Last Admin    [COMPLETED] magnesium sulfate 2g in water 50mL IVPB (premix)  2 g Intravenous ED 1 Time Henrik Brower MD   Stopped at 01/21/24 2005    [COMPLETED] ondansetron injection 4 mg  4 mg Intravenous ED 1 Time TheJun hua MD   4 mg at 01/21/24 1845    [COMPLETED] sodium chloride 0.9% bolus 2,000 mL 2,000 mL  2,000 mL Intravenous ED 1 Time Jun Perera MD 2,000 mL/hr at 01/21/24 1907 2,000 mL at 01/21/24 1907    [DISCONTINUED] LIDOcaine 2000 mg in D5W 250 mL infusion  1 mg/min Intravenous Continuous Henrik Brower MD         Current Outpatient Medications on File Prior to Encounter   Medication Sig Dispense Refill    albuterol (PROVENTIL/VENTOLIN HFA) 90 mcg/actuation inhaler Inhale 1 puff into the lungs as needed.  5    BIKTARVY -25 mg per tablet Take 1 tablet by mouth once daily.  0    buprenorphine-naloxone (SUBOXONE) 8-2 mg Film Place 1 each under the tongue once daily.       busPIRone (BUSPAR) 30 MG Tab Take 1 tablet by mouth 2 (two) times daily.      lidocaine (LIDODERM) 5 % Place 1 patch onto the skin once daily.  0    LORazepam (ATIVAN) 0.5 MG tablet Take 1 tablet (0.5 mg total) by mouth 3 (three) times daily as needed (withdrawal symptoms). 12 tablet 0    thiamine 100 MG tablet Take 1 tablet (100 mg total) by mouth once daily.       Current Inpatient Medications:    Current Facility-Administered Medications:      zvlspiasd-fyckpaqw-hbntoyj ala -25 mg (25 kg or greater) 1 tablet, 1 tablet, Oral, Daily, Raz Musa DO    buprenorphine-naloxone 8-2 mg SL film 1 Film, 1 Film, Sublingual, Daily, Raz Musa DO    enoxaparin injection 40 mg, 40 mg, Subcutaneous, Daily, Raz Musa DO    famotidine tablet 20 mg, 20 mg, Oral, BID, Raz Musa DO    LIDOcaine 2000 mg in D5W 250 mL infusion, 1 mg/min, Intravenous, Continuous, Carl Hay Q., NP, Last Rate: 7.5 mL/hr at 01/22/24 0017, 1 mg/min at 01/22/24 0017    LIDOcaine 4 % cream, , Topical (Top), Once, Cami Gallardo MD    LORazepam tablet 0.5 mg, 0.5 mg, Oral, TID PRN, Raz Musa DO, 0.5 mg at 01/22/24 0051    magnesium sulfate 2g in water 50mL IVPB (premix), 2 g, Intravenous, PRN, Raz Musa DO    morphine injection 2 mg, 2 mg, Intravenous, Q4H PRN, Raz Musa DO, 2 mg at 01/22/24 0354    potassium chloride 10 mEq in 100 mL IVPB, 20 mEq, Intravenous, Daily PRN, Raz Musa DO    scopolamine 1.3-1.5 mg (1 mg over 3 days) 1 patch, 1 patch, Transdermal, Q3 Days, Raz Musa DO, 1 patch at 01/22/24 0115    sodium chloride 0.9% flush 10 mL, 10 mL, Intravenous, PRN, Raz Musa DO    thiamine tablet 100 mg, 100 mg, Oral, Daily, Raz Musa DO    VTE Risk Mitigation (From admission, onward)           Ordered     enoxaparin injection 40 mg  Daily         01/21/24 2211     IP VTE HIGH RISK PATIENT  Once         01/21/24 2211                  Assessment:   VT Storm Requiring Defibrillation- Multiple Shocks since Admission    - Troponin Values Normal   Prolonged QT c  History of Nonischemic Cardiomyopathy EF 40-45%- Now Recovered with EF 55-60%    - Status Post Subcutaneous ICD    - Normal Coronaries in 2019  History of MI  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 2021)  Polysubstance Abuse- with impending acute withdraw     - Opioid Disorder (Heroin) & IV Meth Use    - Nicotine Dependence  History  of Hepatitis C  HIV    - On Biktarvy  History of Right PCA Mycotic Aneurysm  History of Right Parieto-occipital ICH  DNR Status     Plan:   Continue Lidocaine Infusion  Plan Insertion of TVP for Overdrive Pacing  Case discussed with EP- Dr. Interiano  Risks/Benefits/Alternatives of the TVP Discussed with the patient. He elects to proceed.    Thank you for your consult.     NESSA Mabry  Cardiology  [facility] - [facility] ICU  01/22/2024 8:19 AM     I agree with the findings of the complexity of problems addressed and take responsibility for the plan's risks and complications. I approved the plan documented by Lawson Castelan NP.  Plan TVP to shorten QT

## 2024-01-23 LAB
ALBUMIN SERPL-MCNC: 3.4 G/DL (ref 3.5–5)
ALBUMIN/GLOB SERPL: 0.9 RATIO (ref 1.1–2)
ALP SERPL-CCNC: 72 UNIT/L (ref 40–150)
ALT SERPL-CCNC: 6 UNIT/L (ref 0–55)
APTT PPP: 28.1 SECONDS (ref 23.2–33.7)
AST SERPL-CCNC: 12 UNIT/L (ref 5–34)
BASOPHILS # BLD AUTO: 0.02 X10(3)/MCL
BASOPHILS NFR BLD AUTO: 0.2 %
BILIRUB SERPL-MCNC: 0.6 MG/DL
BUN SERPL-MCNC: 13.5 MG/DL (ref 8.9–20.6)
CALCIUM SERPL-MCNC: 9.3 MG/DL (ref 8.4–10.2)
CHLORIDE SERPL-SCNC: 113 MMOL/L (ref 98–107)
CO2 SERPL-SCNC: 23 MMOL/L (ref 22–29)
CREAT SERPL-MCNC: 0.82 MG/DL (ref 0.73–1.18)
EOSINOPHIL # BLD AUTO: 0 X10(3)/MCL (ref 0–0.9)
EOSINOPHIL NFR BLD AUTO: 0 %
ERYTHROCYTE [DISTWIDTH] IN BLOOD BY AUTOMATED COUNT: 13 % (ref 11.5–17)
GFR SERPLBLD CREATININE-BSD FMLA CKD-EPI: >60 MLS/MIN/1.73/M2
GLOBULIN SER-MCNC: 3.9 GM/DL (ref 2.4–3.5)
GLUCOSE SERPL-MCNC: 147 MG/DL (ref 74–100)
HCT VFR BLD AUTO: 44.3 % (ref 42–52)
HCV RNA SERPL NAA+PROBE-ACNC: NORMAL IU/ML
HGB BLD-MCNC: 15.2 G/DL (ref 14–18)
IMM GRANULOCYTES # BLD AUTO: 0.03 X10(3)/MCL (ref 0–0.04)
IMM GRANULOCYTES NFR BLD AUTO: 0.3 %
INR PPP: 1.4
LYMPHOCYTES # BLD AUTO: 1.52 X10(3)/MCL (ref 0.6–4.6)
LYMPHOCYTES NFR BLD AUTO: 13.4 %
MAGNESIUM SERPL-MCNC: 2.2 MG/DL (ref 1.6–2.6)
MCH RBC QN AUTO: 28.8 PG (ref 27–31)
MCHC RBC AUTO-ENTMCNC: 34.3 G/DL (ref 33–36)
MCV RBC AUTO: 83.9 FL (ref 80–94)
MONOCYTES # BLD AUTO: 0.77 X10(3)/MCL (ref 0.1–1.3)
MONOCYTES NFR BLD AUTO: 6.8 %
MRSA PCR SCRN (OHS): NOT DETECTED
NEUTROPHILS # BLD AUTO: 9.01 X10(3)/MCL (ref 2.1–9.2)
NEUTROPHILS NFR BLD AUTO: 79.3 %
NRBC BLD AUTO-RTO: 0 %
PHOSPHATE SERPL-MCNC: 2.9 MG/DL (ref 2.3–4.7)
PLATELET # BLD AUTO: 165 X10(3)/MCL (ref 130–400)
PMV BLD AUTO: 9.6 FL (ref 7.4–10.4)
POTASSIUM SERPL-SCNC: 4.1 MMOL/L (ref 3.5–5.1)
PROT SERPL-MCNC: 7.3 GM/DL (ref 6.4–8.3)
PROTHROMBIN TIME: 16.6 SECONDS (ref 12.5–14.5)
RBC # BLD AUTO: 5.28 X10(6)/MCL (ref 4.7–6.1)
SODIUM SERPL-SCNC: 142 MMOL/L (ref 136–145)
TROPONIN I SERPL-MCNC: 0.14 NG/ML (ref 0–0.04)
VANCOMYCIN SERPL-MCNC: 15.9 UG/ML (ref 15–20)
VANCOMYCIN TROUGH SERPL-MCNC: 35.3 UG/ML (ref 15–20)
WBC # SPEC AUTO: 11.35 X10(3)/MCL (ref 4.5–11.5)

## 2024-01-23 PROCEDURE — 80202 ASSAY OF VANCOMYCIN: CPT | Performed by: INTERNAL MEDICINE

## 2024-01-23 PROCEDURE — 87641 MR-STAPH DNA AMP PROBE: CPT | Performed by: INTERNAL MEDICINE

## 2024-01-23 PROCEDURE — 20000000 HC ICU ROOM

## 2024-01-23 PROCEDURE — 63600175 PHARM REV CODE 636 W HCPCS: Performed by: INTERNAL MEDICINE

## 2024-01-23 PROCEDURE — 25000003 PHARM REV CODE 250: Performed by: NURSE PRACTITIONER

## 2024-01-23 PROCEDURE — 63600175 PHARM REV CODE 636 W HCPCS

## 2024-01-23 PROCEDURE — 93005 ELECTROCARDIOGRAM TRACING: CPT

## 2024-01-23 PROCEDURE — 25000003 PHARM REV CODE 250

## 2024-01-23 PROCEDURE — 63600175 PHARM REV CODE 636 W HCPCS: Performed by: NURSE PRACTITIONER

## 2024-01-23 PROCEDURE — 85025 COMPLETE CBC W/AUTO DIFF WBC: CPT | Performed by: INTERNAL MEDICINE

## 2024-01-23 PROCEDURE — 80053 COMPREHEN METABOLIC PANEL: CPT | Performed by: INTERNAL MEDICINE

## 2024-01-23 PROCEDURE — 25000003 PHARM REV CODE 250: Performed by: INTERNAL MEDICINE

## 2024-01-23 PROCEDURE — 99291 CRITICAL CARE FIRST HOUR: CPT | Mod: FS,,, | Performed by: GENERAL PRACTICE

## 2024-01-23 PROCEDURE — 84484 ASSAY OF TROPONIN QUANT: CPT

## 2024-01-23 PROCEDURE — 84100 ASSAY OF PHOSPHORUS: CPT | Performed by: INTERNAL MEDICINE

## 2024-01-23 PROCEDURE — 85610 PROTHROMBIN TIME: CPT

## 2024-01-23 PROCEDURE — 83735 ASSAY OF MAGNESIUM: CPT | Performed by: INTERNAL MEDICINE

## 2024-01-23 PROCEDURE — 85730 THROMBOPLASTIN TIME PARTIAL: CPT

## 2024-01-23 PROCEDURE — 93010 ELECTROCARDIOGRAM REPORT: CPT | Mod: ,,, | Performed by: INTERNAL MEDICINE

## 2024-01-23 RX ORDER — BUPRENORPHINE 2 MG/1
4 TABLET SUBLINGUAL ONCE
Status: COMPLETED | OUTPATIENT
Start: 2024-01-23 | End: 2024-01-23

## 2024-01-23 RX ORDER — BUPRENORPHINE HYDROCHLORIDE 8 MG/1
8 TABLET SUBLINGUAL DAILY
Status: DISCONTINUED | OUTPATIENT
Start: 2024-01-24 | End: 2024-01-25

## 2024-01-23 RX ORDER — BUSPIRONE HYDROCHLORIDE 15 MG/1
30 TABLET ORAL 2 TIMES DAILY
Status: DISCONTINUED | OUTPATIENT
Start: 2024-01-23 | End: 2024-01-27 | Stop reason: HOSPADM

## 2024-01-23 RX ORDER — MUPIROCIN 20 MG/G
OINTMENT TOPICAL 2 TIMES DAILY
Status: DISCONTINUED | OUTPATIENT
Start: 2024-01-23 | End: 2024-01-27 | Stop reason: HOSPADM

## 2024-01-23 RX ORDER — GABAPENTIN 300 MG/1
300 CAPSULE ORAL ONCE
Status: COMPLETED | OUTPATIENT
Start: 2024-01-24 | End: 2024-01-23

## 2024-01-23 RX ORDER — HEPARIN SODIUM,PORCINE/D5W 25000/250
0-40 INTRAVENOUS SOLUTION INTRAVENOUS CONTINUOUS
Status: DISCONTINUED | OUTPATIENT
Start: 2024-01-23 | End: 2024-01-23

## 2024-01-23 RX ORDER — KETOROLAC TROMETHAMINE 30 MG/ML
30 INJECTION, SOLUTION INTRAMUSCULAR; INTRAVENOUS ONCE
Status: COMPLETED | OUTPATIENT
Start: 2024-01-23 | End: 2024-01-23

## 2024-01-23 RX ORDER — SULFAMETHOXAZOLE AND TRIMETHOPRIM 800; 160 MG/1; MG/1
1 TABLET ORAL DAILY
Status: DISCONTINUED | OUTPATIENT
Start: 2024-01-31 | End: 2024-01-24

## 2024-01-23 RX ORDER — SULFAMETHOXAZOLE AND TRIMETHOPRIM 800; 160 MG/1; MG/1
1 TABLET ORAL 2 TIMES DAILY
Status: DISCONTINUED | OUTPATIENT
Start: 2024-01-23 | End: 2024-01-23

## 2024-01-23 RX ORDER — SULFAMETHOXAZOLE AND TRIMETHOPRIM 800; 160 MG/1; MG/1
1 TABLET ORAL 2 TIMES DAILY
Status: DISCONTINUED | OUTPATIENT
Start: 2024-01-23 | End: 2024-01-24

## 2024-01-23 RX ORDER — TRAZODONE HYDROCHLORIDE 100 MG/1
100 TABLET ORAL NIGHTLY
Status: DISCONTINUED | OUTPATIENT
Start: 2024-01-23 | End: 2024-01-27 | Stop reason: HOSPADM

## 2024-01-23 RX ADMIN — DEXMEDETOMIDINE HYDROCHLORIDE 1.4 MCG/KG/HR: 400 INJECTION INTRAVENOUS at 04:01

## 2024-01-23 RX ADMIN — VANCOMYCIN HYDROCHLORIDE 1250 MG: 1.25 INJECTION, POWDER, LYOPHILIZED, FOR SOLUTION INTRAVENOUS at 03:01

## 2024-01-23 RX ADMIN — DEXMEDETOMIDINE HYDROCHLORIDE 1.4 MCG/KG/HR: 400 INJECTION INTRAVENOUS at 01:01

## 2024-01-23 RX ADMIN — FAMOTIDINE 20 MG: 20 TABLET, FILM COATED ORAL at 08:01

## 2024-01-23 RX ADMIN — GABAPENTIN 300 MG: 300 CAPSULE ORAL at 11:01

## 2024-01-23 RX ADMIN — THIAMINE HCL TAB 100 MG 100 MG: 100 TAB at 08:01

## 2024-01-23 RX ADMIN — DEXMEDETOMIDINE HYDROCHLORIDE 1.4 MCG/KG/HR: 400 INJECTION INTRAVENOUS at 12:01

## 2024-01-23 RX ADMIN — BUSPIRONE HYDROCHLORIDE 30 MG: 15 TABLET ORAL at 04:01

## 2024-01-23 RX ADMIN — BUPRENORPHINE 4 MG: 2 TABLET SUBLINGUAL at 08:01

## 2024-01-23 RX ADMIN — DEXMEDETOMIDINE HYDROCHLORIDE 1.4 MCG/KG/HR: 400 INJECTION INTRAVENOUS at 09:01

## 2024-01-23 RX ADMIN — DEXMEDETOMIDINE HYDROCHLORIDE 1.4 MCG/KG/HR: 400 INJECTION INTRAVENOUS at 08:01

## 2024-01-23 RX ADMIN — SODIUM CHLORIDE, POTASSIUM CHLORIDE, SODIUM LACTATE AND CALCIUM CHLORIDE 1000 ML: 600; 310; 30; 20 INJECTION, SOLUTION INTRAVENOUS at 12:01

## 2024-01-23 RX ADMIN — ATOVAQUONE 1500 MG: 750 SUSPENSION ORAL at 08:01

## 2024-01-23 RX ADMIN — SULFAMETHOXAZOLE AND TRIMETHOPRIM 1 TABLET: 800; 160 TABLET ORAL at 09:01

## 2024-01-23 RX ADMIN — KETOROLAC TROMETHAMINE 30 MG: 30 INJECTION, SOLUTION INTRAMUSCULAR; INTRAVENOUS at 09:01

## 2024-01-23 RX ADMIN — TRAZODONE HYDROCHLORIDE 100 MG: 100 TABLET ORAL at 09:01

## 2024-01-23 RX ADMIN — BUPRENORPHINE 4 MG: 2 TABLET SUBLINGUAL at 12:01

## 2024-01-23 RX ADMIN — LIDOCAINE HYDROCHLORIDE 1 MG/MIN: 8 INJECTION, SOLUTION INTRAVENOUS at 08:01

## 2024-01-23 RX ADMIN — DEXMEDETOMIDINE HYDROCHLORIDE 1.4 MCG/KG/HR: 400 INJECTION INTRAVENOUS at 05:01

## 2024-01-23 NOTE — NURSING
Reached out to Dr. Aston Montelongo for patient's medical records for HIV history, left message with office, awaiting call back. Also reached out to [facility] for records of pt.'s HIV medication, however the records indicate medication has not been filled there for multiple months.

## 2024-01-23 NOTE — PROGRESS NOTES
Pharmacokinetic Assessment Follow Up: IV Vancomycin    Vancomycin serum concentration assessment(s):    The trough level was drawn correctly and can be used to guide therapy at this time. The measurement is above the desired definitive target range of 15 to 20 mcg/mL.    Vancomycin Regimen Plan:    Discontinue the scheduled vancomycin regimen and re-dose when the random level is less than 20 mcg/mL, next level to be drawn at 01/23 on 2100.      Drug levels (last 3 results):  Recent Labs   Lab Result Units 01/23/24  0951   Vancomycin Trough ug/ml 35.3*       Vancomycin Administrations:  vancomycin given in the last 96 hours                     vancomycin 1.25 g in dextrose 5% 250 mL IVPB (ready to mix) (mg) 1,250 mg New Bag 01/23/24 0305     1,250 mg New Bag 01/22/24 1958    vancomycin 1.75 g in 5 % dextrose 500 mL IVPB (mg) 1,750 mg New Bag 01/22/24 1208                    Pharmacy will continue to follow and monitor vancomycin.    Please contact pharmacy at extension [phone] for questions regarding this assessment.    Thank you for the consult,   Lei Sanabria       Patient brief summary:  Britton Perez is a 33 y.o. male initiated on antimicrobial therapy with IV Vancomycin for treatment of skin & soft tissue infection      Drug Allergies:   Review of patient's allergies indicates:   Allergen Reactions    Penicillins        Actual Body Weight:  Wt Readings from Last 1 Encounters:   01/21/24 70.3 kg (155 lb)       Renal Function:   Estimated Creatinine Clearance: 125 mL/min (based on SCr of 0.82 mg/dL).,     Dialysis Method (if applicable):  N/A    CBC (last 72 hours):  Recent Labs   Lab Result Units 01/21/24  1923 01/22/24  0119 01/23/24  0009   WBC x10(3)/mcL 8.96 8.60 11.35   Hgb g/dL 14.9 13.4* 15.2   Hct % 42.3 38.5* 44.3   Platelet x10(3)/mcL 202 164 165   Mono % % 2.1 2.3 6.8   Eos % % 0.0 0.0 0.0   Basophil % % 0.1 0.1 0.2       Metabolic Panel (last 72 hours):  Recent Labs   Lab Result Units 01/21/24  1923  01/22/24  0119 01/22/24  0905 01/22/24 1834 01/23/24  0009   Sodium Level mmol/L 138 139  --   --  142   Potassium Level mmol/L 4.1 3.8 3.9 4.1 4.1   Chloride mmol/L 103 107  --   --  113*   Carbon Dioxide mmol/L 21* 20*  --   --  23   Glucose Level mg/dL 105* 110*  --   --  147*   Blood Urea Nitrogen mg/dL 15.5 14.5  --   --  13.5   Creatinine mg/dL 0.89 0.71*  --   --  0.82   Albumin Level g/dL 4.3 3.6  --   --  3.4*   Bilirubin Total mg/dL 0.6 0.4  --   --  0.6   Alkaline Phosphatase unit/L 91 79  --   --  72   Aspartate Aminotransferase unit/L 20 13  --   --  12   Alanine Aminotransferase unit/L 8 7  --   --  6   Magnesium Level mg/dL 2.20 2.30 2.10 2.20 2.20   Phosphorus Level mg/dL  --  2.4  --   --  2.9       Microbiologic Results:  Microbiology Results (last 7 days)       Procedure Component Value Units Date/Time    Blood Culture [[id number]] Collected: 01/22/24 1834    Order Status: Resulted Specimen: Blood from Arm, Right Updated: 01/22/24 1836    Blood Culture [[id number]] Collected: 01/22/24 0914    Order Status: Resulted Specimen: Arterial Blood Line Updated: 01/22/24 1010

## 2024-01-23 NOTE — PROGRESS NOTES
[facility]    Cardiology  Consult Note    Patient Name: Britton Perez  MRN: [id number]  Admission Date: 1/21/2024  Hospital Length of Stay: 2 days  Code Status: DNR   Attending Provider: Cami Gallardo MD   Consulting Provider: NICOLAS Baldwin  Primary Care Physician: Kike Muller MD  Principal Problem:VT (ventricular tachycardia)    Patient information was obtained from patient, past medical records, ER records, and primary team.     Subjective:   Consultation Reason: VT/VF Arrest Requiring ICD Shock    HPI: Mr. Perez is a 33 year old male, unknown to [facility], who presented to the hospital from senior care after having been found down with seizure like activity. He was brought to [facility] ED where he reportedly had 4 episodes of Vtach/vfib and was immediately cardioverted by his ICD. With subsequent shocks patient would apparently be awake  and alert but notably bradycardic. He was then transferred to [facility] due to the lack of cardiology services. When he arrived at [facility] he went into VT yet again and was shocked by his device. [facility] was consulted and recommended amiodarone infusion. He is admitted to ICU for close monitoring. [facility] is consulted for cardiac evaluation/management.    1.23.24: NAD. Resting. No Ectopy Noted.     PMH: Hypertension, Hepatitis C, HIV, Polysubstance Abuse, PTSD, Seizures, Neuropathy, Old MI, Depression, Cardiac Arrest/ICD, NICMO  PSH:  LHC, SQ ICD, Device Placement & Removal, Tracheostomy with Removal, Chest Tube Insertion/Removal  Family History: None  Social History: Tobacco- Active Use, Positive for Polysubstance Abuse- IV Methamphetamine Use, Alcohol- Negative    Previous Cardiac Diagnostics:   Echocardiogram (1.22.24):  Left Ventricle: The left ventricle is normal in size. Normal wall thickness. Normal wall motion. There is normal systolic function with a visually estimated ejection fraction of 55 - 60%. There is normal diastolic function.  Right Ventricle: Normal right  ventricular cavity size. Systolic function is normal. TAPSE is 2.04 cm.    Echocardiogram (8.15.21):  RVSP ~ 30mmHg   Normal left ventricle cavity size. Normal wall thickness. Mild to   moderately (40-45%) decreased ejection fraction.   Moderate mitral regurgitation.   Mild to moderate tricuspid regurgitation. Mild pulmonary hypertension present.     Coronary Angiogram (8.30.19):  Angiographically normal coronary arteries.  LV end diastolic pressure is normal.    Review of patient's allergies indicates:   Allergen Reactions    Penicillins      No current facility-administered medications on file prior to encounter.     Current Outpatient Medications on File Prior to Encounter   Medication Sig    albuterol (PROVENTIL/VENTOLIN HFA) 90 mcg/actuation inhaler Inhale 1 puff into the lungs as needed.    BIKTARVY -25 mg per tablet Take 1 tablet by mouth once daily.    buprenorphine-naloxone (SUBOXONE) 8-2 mg Film Place 1 each under the tongue once daily.     busPIRone (BUSPAR) 30 MG Tab Take 1 tablet by mouth 2 (two) times daily.    lidocaine (LIDODERM) 5 % Place 1 patch onto the skin once daily.    LORazepam (ATIVAN) 0.5 MG tablet Take 1 tablet (0.5 mg total) by mouth 3 (three) times daily as needed (withdrawal symptoms).    thiamine 100 MG tablet Take 1 tablet (100 mg total) by mouth once daily.     Review of Systems   Constitutional:  Positive for fatigue.   Respiratory:  Negative for chest tightness and shortness of breath.    Gastrointestinal:  Negative for nausea and vomiting.   All other systems reviewed and are negative.    Objective:     Vital Signs (Most Recent):  Temp: 97.8 °F (36.6 °C) (01/23/24 1200)  Pulse: 89 (01/23/24 1415)  Resp: 10 (01/23/24 1415)  BP: 91/66 (01/23/24 1500)  SpO2: 95 % (01/23/24 1415) Vital Signs (24h Range):  Temp:  [97.8 °F (36.6 °C)-98.6 °F (37 °C)] 97.8 °F (36.6 °C)  Pulse:  [] 89  Resp:  [9-47] 10  SpO2:  [92 %-98 %] 95 %  BP: ()/(64-81) 91/66  Arterial Line BP:  ()/(18-75) 102/69     Weight: 70.3 kg (155 lb)  Body mass index is 19.89 kg/m².    SpO2: 95 %         Intake/Output Summary (Last 24 hours) at 1/23/2024 1611  Last data filed at 1/23/2024 1135  Gross per 24 hour   Intake 324.6 ml   Output 1000 ml   Net -675.4 ml       Lines/Drains/Airways       Arterial Line  Duration             Arterial Line 01/22/24 0101 Left Radial 1 day              Peripheral Intravenous Line  Duration                  Peripheral IV - Single Lumen 01/22/24 0345 20 G Anterior;Left Forearm 1 day         Peripheral IV - Single Lumen 01/22/24 1459 Anterior;Distal;Right Upper Arm 1 day         Sheath 01/22/24 1108 Right 1 day                  Significant Labs:  Recent Results (from the past 72 hour(s))   COVID/FLU A&B PCR    Collection Time: 01/21/24  6:57 PM   Result Value Ref Range    Influenza A PCR Not Detected Not Detected    Influenza B PCR Not Detected Not Detected    SARS-CoV-2 PCR Not Detected Not Detected, Negative   POCT glucose    Collection Time: 01/21/24  7:19 PM   Result Value Ref Range    POCT Glucose 104 70 - 110 mg/dL   Comprehensive metabolic panel    Collection Time: 01/21/24  7:23 PM   Result Value Ref Range    Sodium Level 138 136 - 145 mmol/L    Potassium Level 4.1 3.5 - 5.1 mmol/L    Chloride 103 98 - 107 mmol/L    Carbon Dioxide 21 (L) 22 - 29 mmol/L    Glucose Level 105 (H) 74 - 100 mg/dL    Blood Urea Nitrogen 15.5 8.9 - 20.6 mg/dL    Creatinine 0.89 0.73 - 1.18 mg/dL    Calcium Level Total 9.9 8.4 - 10.2 mg/dL    Protein Total 9.1 (H) 6.4 - 8.3 gm/dL    Albumin Level 4.3 3.5 - 5.0 g/dL    Globulin 4.8 (H) 2.4 - 3.5 gm/dL    Albumin/Globulin Ratio 0.9 (L) 1.1 - 2.0 ratio    Bilirubin Total 0.6 <=1.5 mg/dL    Alkaline Phosphatase 91 40 - 150 unit/L    Alanine Aminotransferase 8 0 - 55 unit/L    Aspartate Aminotransferase 20 5 - 34 unit/L    eGFR >60 mls/min/1.73/m2   Magnesium    Collection Time: 01/21/24  7:23 PM   Result Value Ref Range    Magnesium Level 2.20 1.60  - 2.60 mg/dL   Troponin I    Collection Time: 01/21/24  7:23 PM   Result Value Ref Range    Troponin-I <0.010 0.000 - 0.045 ng/mL   CK-MB    Collection Time: 01/21/24  7:23 PM   Result Value Ref Range    Creatine Kinase MB 4.3 <=7.2 ng/mL   CK    Collection Time: 01/21/24  7:23 PM   Result Value Ref Range    Creatine Kinase 338 (H) 30 - 200 U/L   Lactic acid, plasma    Collection Time: 01/21/24  7:23 PM   Result Value Ref Range    Lactic Acid Level 2.1 0.5 - 2.2 mmol/L   CBC with Differential    Collection Time: 01/21/24  7:23 PM   Result Value Ref Range    WBC 8.96 4.50 - 11.50 x10(3)/mcL    RBC 5.05 4.70 - 6.10 x10(6)/mcL    Hgb 14.9 14.0 - 18.0 g/dL    Hct 42.3 42.0 - 52.0 %    MCV 83.8 80.0 - 94.0 fL    MCH 29.5 27.0 - 31.0 pg    MCHC 35.2 33.0 - 36.0 g/dL    RDW 12.8 11.5 - 17.0 %    Platelet 202 130 - 400 x10(3)/mcL    MPV 9.8 7.4 - 10.4 fL    Neut % 85.7 %    Lymph % 11.9 %    Mono % 2.1 %    Eos % 0.0 %    Basophil % 0.1 %    Lymph # 1.07 0.6 - 4.6 x10(3)/mcL    Neut # 7.67 2.1 - 9.2 x10(3)/mcL    Mono # 0.19 0.1 - 1.3 x10(3)/mcL    Eos # 0.00 0 - 0.9 x10(3)/mcL    Baso # 0.01 <=0.2 x10(3)/mcL    IG# 0.02 0 - 0.04 x10(3)/mcL    IG% 0.2 %    NRBC% 0.0 %   TSH    Collection Time: 01/21/24  7:23 PM   Result Value Ref Range    TSH 0.288 (L) 0.350 - 4.940 uIU/mL   T4, Free    Collection Time: 01/21/24  7:23 PM   Result Value Ref Range    Thyroxine Free 1.16 0.70 - 1.48 ng/dL   Hepatitis C Antibody    Collection Time: 01/21/24  7:23 PM   Result Value Ref Range    Hep C Ab Interp Reactive (A) Nonreactive   Light Blue Top Hold    Collection Time: 01/21/24  7:27 PM   Result Value Ref Range    Extra Tube Hold for add-ons.    Gold Top Hold    Collection Time: 01/21/24  7:28 PM   Result Value Ref Range    Extra Tube Hold for add-ons.    Echo Saline Bubble? No    Collection Time: 01/22/24 12:35 AM   Result Value Ref Range    BSA 1.92 m2    Suero's Biplane MOD Ejection Fraction 58 %    LVIDd 4.80 3.5 - 6.0 cm    LV  Systolic Volume 41.00 mL    LV Systolic Volume Index 21.0 mL/m2    LVIDs 3.20 2.1 - 4.0 cm    LV Diastolic Volume 108.00 mL    LV Diastolic Volume Index 55.38 mL/m2    IVS 0.80 0.6 - 1.1 cm    FS 33 28 - 44 %    Left Ventricle Relative Wall Thickness 0.46 cm    Posterior Wall 1.10 0.6 - 1.1 cm    LV mass 158.82 g    LV Mass Index 81 g/m2    MV Peak E Freddie 0.91 m/s    TDI LATERAL 0.11 m/s    TDI SEPTAL 0.11 m/s    E/E' ratio 8.27 m/s    MV Peak A Freddie 0.54 m/s    TR Max Freddie 1.33 m/s    E/A ratio 1.69     E wave deceleration time 214.00 msec    LV SEPTAL E/E' RATIO 8.27 m/s    LV LATERAL E/E' RATIO 8.27 m/s    LVOT peak freddie 0.73 m/s    Left Ventricular Outflow Tract Mean Velocity 0.46 cm/s    Left Ventricular Outflow Tract Mean Gradient 1.00 mmHg    TAPSE 2.04 cm    LA size 3.20 cm    LA volume (mod) 37.80 cm3    LA Volume Index (Mod) 19.4 mL/m2    AV mean gradient 5 mmHg    AV peak gradient 9 mmHg    Ao peak freddie 1.50 m/s    Ao VTI 28.70 cm    LVOT peak VTI 14.80 cm    AV Velocity Ratio 0.49     AV index (prosthetic) 0.52     Triscuspid Valve Regurgitation Peak Gradient 7 mmHg    Mean e' 0.11 m/s    ZLVIDS -0.52     ZLVIDD -1.47    Magnesium    Collection Time: 01/22/24  1:19 AM   Result Value Ref Range    Magnesium Level 2.30 1.60 - 2.60 mg/dL   CD4 Lymphocytes    Collection Time: 01/22/24  1:19 AM   Result Value Ref Range    Patient Age 33     WBC Absolute 8,600 4,500 - 11,500 /mm3    Lymph Percent 12 (L) 28 - 48 %    Lymph Absolute 1,032 (L) 1,260 - 5,520 x10(3)/mcL    CD4 % 6.0 %    CD4 Absolute 62 (L) 589 - 1,505 unit/L    T Cell Interp       Decreased absolute lymphocyte count with decreased absolute CD4+ lymphocyte count.    Dwaine Howell M.D.    Troponin I    Collection Time: 01/22/24  1:19 AM   Result Value Ref Range    Troponin-I <0.010 0.000 - 0.045 ng/mL   Phosphorus    Collection Time: 01/22/24  1:19 AM   Result Value Ref Range    Phosphorus Level 2.4 2.3 - 4.7 mg/dL   Comprehensive Metabolic Panel     Collection Time: 01/22/24  1:19 AM   Result Value Ref Range    Sodium Level 139 136 - 145 mmol/L    Potassium Level 3.8 3.5 - 5.1 mmol/L    Chloride 107 98 - 107 mmol/L    Carbon Dioxide 20 (L) 22 - 29 mmol/L    Glucose Level 110 (H) 74 - 100 mg/dL    Blood Urea Nitrogen 14.5 8.9 - 20.6 mg/dL    Creatinine 0.71 (L) 0.73 - 1.18 mg/dL    Calcium Level Total 8.8 8.4 - 10.2 mg/dL    Protein Total 7.1 6.4 - 8.3 gm/dL    Albumin Level 3.6 3.5 - 5.0 g/dL    Globulin 3.5 2.4 - 3.5 gm/dL    Albumin/Globulin Ratio 1.0 (L) 1.1 - 2.0 ratio    Bilirubin Total 0.4 <=1.5 mg/dL    Alkaline Phosphatase 79 40 - 150 unit/L    Alanine Aminotransferase 7 0 - 55 unit/L    Aspartate Aminotransferase 13 5 - 34 unit/L    eGFR >60 mls/min/1.73/m2   CBC with Differential    Collection Time: 01/22/24  1:19 AM   Result Value Ref Range    WBC 8.60 4.50 - 11.50 x10(3)/mcL    RBC 4.59 (L) 4.70 - 6.10 x10(6)/mcL    Hgb 13.4 (L) 14.0 - 18.0 g/dL    Hct 38.5 (L) 42.0 - 52.0 %    MCV 83.9 80.0 - 94.0 fL    MCH 29.2 27.0 - 31.0 pg    MCHC 34.8 33.0 - 36.0 g/dL    RDW 12.8 11.5 - 17.0 %    Platelet 164 130 - 400 x10(3)/mcL    MPV 10.1 7.4 - 10.4 fL    Neut % 85.9 %    Lymph % 11.5 %    Mono % 2.3 %    Eos % 0.0 %    Basophil % 0.1 %    Lymph # 0.99 0.6 - 4.6 x10(3)/mcL    Neut # 7.38 2.1 - 9.2 x10(3)/mcL    Mono # 0.20 0.1 - 1.3 x10(3)/mcL    Eos # 0.00 0 - 0.9 x10(3)/mcL    Baso # 0.01 <=0.2 x10(3)/mcL    IG# 0.02 0 - 0.04 x10(3)/mcL    IG% 0.2 %    NRBC% 0.0 %   Magnesium    Collection Time: 01/22/24  9:05 AM   Result Value Ref Range    Magnesium Level 2.10 1.60 - 2.60 mg/dL   Potassium    Collection Time: 01/22/24  9:05 AM   Result Value Ref Range    Potassium Level 3.9 3.5 - 5.1 mmol/L   Troponin I    Collection Time: 01/22/24  9:05 AM   Result Value Ref Range    Troponin-I <0.010 0.000 - 0.045 ng/mL   Blood Culture    Collection Time: 01/22/24  9:14 AM    Specimen: Arterial Blood Line   Result Value Ref Range    CULTURE, BLOOD ([facility]) No Growth At 24  Hours    Magnesium    Collection Time: 01/22/24  6:34 PM   Result Value Ref Range    Magnesium Level 2.20 1.60 - 2.60 mg/dL   Potassium    Collection Time: 01/22/24  6:34 PM   Result Value Ref Range    Potassium Level 4.1 3.5 - 5.1 mmol/L   SYPHILIS ANTIBODY (WITH REFLEX RPR)    Collection Time: 01/22/24  6:34 PM   Result Value Ref Range    Syphilis Antibody Nonreactive Nonreactive, Equivocal   Magnesium    Collection Time: 01/23/24 12:09 AM   Result Value Ref Range    Magnesium Level 2.20 1.60 - 2.60 mg/dL   Comprehensive Metabolic Panel    Collection Time: 01/23/24 12:09 AM   Result Value Ref Range    Sodium Level 142 136 - 145 mmol/L    Potassium Level 4.1 3.5 - 5.1 mmol/L    Chloride 113 (H) 98 - 107 mmol/L    Carbon Dioxide 23 22 - 29 mmol/L    Glucose Level 147 (H) 74 - 100 mg/dL    Blood Urea Nitrogen 13.5 8.9 - 20.6 mg/dL    Creatinine 0.82 0.73 - 1.18 mg/dL    Calcium Level Total 9.3 8.4 - 10.2 mg/dL    Protein Total 7.3 6.4 - 8.3 gm/dL    Albumin Level 3.4 (L) 3.5 - 5.0 g/dL    Globulin 3.9 (H) 2.4 - 3.5 gm/dL    Albumin/Globulin Ratio 0.9 (L) 1.1 - 2.0 ratio    Bilirubin Total 0.6 <=1.5 mg/dL    Alkaline Phosphatase 72 40 - 150 unit/L    Alanine Aminotransferase 6 0 - 55 unit/L    Aspartate Aminotransferase 12 5 - 34 unit/L    eGFR >60 mls/min/1.73/m2   Phosphorus    Collection Time: 01/23/24 12:09 AM   Result Value Ref Range    Phosphorus Level 2.9 2.3 - 4.7 mg/dL   CBC with Differential    Collection Time: 01/23/24 12:09 AM   Result Value Ref Range    WBC 11.35 4.50 - 11.50 x10(3)/mcL    RBC 5.28 4.70 - 6.10 x10(6)/mcL    Hgb 15.2 14.0 - 18.0 g/dL    Hct 44.3 42.0 - 52.0 %    MCV 83.9 80.0 - 94.0 fL    MCH 28.8 27.0 - 31.0 pg    MCHC 34.3 33.0 - 36.0 g/dL    RDW 13.0 11.5 - 17.0 %    Platelet 165 130 - 400 x10(3)/mcL    MPV 9.6 7.4 - 10.4 fL    Neut % 79.3 %    Lymph % 13.4 %    Mono % 6.8 %    Eos % 0.0 %    Basophil % 0.2 %    Lymph # 1.52 0.6 - 4.6 x10(3)/mcL    Neut # 9.01 2.1 - 9.2 x10(3)/mcL     Mono # 0.77 0.1 - 1.3 x10(3)/mcL    Eos # 0.00 0 - 0.9 x10(3)/mcL    Baso # 0.02 <=0.2 x10(3)/mcL    IG# 0.03 0 - 0.04 x10(3)/mcL    IG% 0.3 %    NRBC% 0.0 %   VANCOMYCIN, TROUGH    Collection Time: 01/23/24  9:51 AM   Result Value Ref Range    Vancomycin Trough 35.3 (HH) 15.0 - 20.0 ug/ml   MRSA PCR    Collection Time: 01/23/24  1:23 PM   Result Value Ref Range    MRSA PCR SCRN ([facility]) Not Detected Not Detected     Significant Imaging:  Imaging Results              X-Ray Chest 1 View (Final result)  Result time 01/22/24 08:24:03      Final result by Lewis Malhotra MD (01/22/24 08:24:03)                   Impression:      No significant abnormalities      Electronically signed by: Lewis Malhotra  Date:    01/22/2024  Time:    08:24               Narrative:    EXAMINATION:  XR CHEST 1 VIEW    CPT 52662    CLINICAL HISTORY:  lateral to look at icd;    FINDINGS:  Only lateral projection is provided lung fields appear to be clear and free of gross infiltrates atelectasis or effusions.  ICD device is identified                                    Telemetry: Paced    Physical Exam  Vitals and nursing note reviewed.   Constitutional:       General: He is not in acute distress.     Appearance: Normal appearance. He is ill-appearing.   HENT:      Head: Normocephalic.      Mouth/Throat:      Mouth: Mucous membranes are moist.   Eyes:      Extraocular Movements: Extraocular movements intact.      Conjunctiva/sclera: Conjunctivae normal.   Cardiovascular:      Rate and Rhythm: Normal rate and regular rhythm.      Heart sounds: Normal heart sounds.      Comments: Paced  Pulmonary:      Effort: Pulmonary effort is normal. No respiratory distress.      Breath sounds: Normal breath sounds.   Abdominal:      Palpations: Abdomen is soft.   Musculoskeletal:         General: Normal range of motion.      Cervical back: Neck supple.      Right lower leg: No edema.      Left lower leg: No edema.   Skin:     General: Skin is warm and  dry.      Comments: R Lateral Neck Active Fixation/Generator   Neurological:      General: No focal deficit present.      Mental Status: He is alert and oriented to person, place, and time. Mental status is at baseline.   Psychiatric:         Mood and Affect: Mood normal.         Behavior: Behavior normal.       Home Medications:   No current facility-administered medications on file prior to encounter.     Current Outpatient Medications on File Prior to Encounter   Medication Sig Dispense Refill    albuterol (PROVENTIL/VENTOLIN HFA) 90 mcg/actuation inhaler Inhale 1 puff into the lungs as needed.  5    BIKTARVY -25 mg per tablet Take 1 tablet by mouth once daily.  0    buprenorphine-naloxone (SUBOXONE) 8-2 mg Film Place 1 each under the tongue once daily.       busPIRone (BUSPAR) 30 MG Tab Take 1 tablet by mouth 2 (two) times daily.      lidocaine (LIDODERM) 5 % Place 1 patch onto the skin once daily.  0    LORazepam (ATIVAN) 0.5 MG tablet Take 1 tablet (0.5 mg total) by mouth 3 (three) times daily as needed (withdrawal symptoms). 12 tablet 0    thiamine 100 MG tablet Take 1 tablet (100 mg total) by mouth once daily.       Current Inpatient Medications:    Current Facility-Administered Medications:     [id number]-tytaitgx-zcuckzq ala -25 mg (25 kg or greater) 1 tablet, 1 tablet, Oral, Daily, Raz Musa DO    [START ON 1/24/2024] buprenorphine HCL SL tablet 8 mg, 8 mg, Sublingual, Daily, Sushila Del Rio MD    busPIRone tablet 30 mg, 30 mg, Oral, BID, Sushila Del Rio MD    dexmedetomidine (PRECEDEX) 400mcg/100mL 0.9% NaCL infusion, 0-1.4 mcg/kg/hr, Intravenous, Continuous, Cami Gallardo MD, Last Rate: 24.61 mL/hr at 01/23/24 1236, 1.4 mcg/kg/hr at 01/23/24 1236    enoxaparin injection 40 mg, 40 mg, Subcutaneous, Daily, Raz Musa DO    famotidine tablet 20 mg, 20 mg, Oral, BID, Raz Musa DO, 20 mg at 01/23/24 0827    LIDOcaine 2000 mg in D5W 250 mL infusion, 0.5 mg/min, Intravenous,  Continuous, Ayad Kwong MD, Last Rate: 3.8 mL/hr at 01/23/24 1135, 0.5 mg/min at 01/23/24 1135    LIDOcaine 4 % cream, , Topical (Top), Once, Cami Gallardo MD    magnesium sulfate 2g in water 50mL IVPB (premix), 2 g, Intravenous, PRN, Raz Musa DO    potassium chloride 10 mEq in 100 mL IVPB, 20 mEq, Intravenous, Daily PRN, Raz Musa DO    scopolamine 1.3-1.5 mg (1 mg over 3 days) 1 patch, 1 patch, Transdermal, Q3 Days, Raz Musa DO, 1 patch at 01/22/24 0115    sodium chloride 0.9% flush 10 mL, 10 mL, Intravenous, PRN, Raz Musa DO    sulfamethoxazole-trimethoprim 800-160mg per tablet 1 tablet, 1 tablet, Oral, BID, Virginia Jennings, FNP    [START ON 1/31/2024] sulfamethoxazole-trimethoprim 800-160mg per tablet 1 tablet, 1 tablet, Oral, Daily, Virginia Jennings, FNP    thiamine tablet 100 mg, 100 mg, Oral, Daily, Raz Musa DO, 100 mg at 01/23/24 0827    Pharmacy to dose Vancomycin consult, , , Once **AND** vancomycin - pharmacy to dose, , Intravenous, pharmacy to manage frequency, Raz Musa DO    VTE Risk Mitigation (From admission, onward)           Ordered     enoxaparin injection 40 mg  Daily         01/21/24 2211     IP VTE HIGH RISK PATIENT  Once         01/21/24 2211                  Assessment:   VT Storm Requiring Defibrillation- Multiple Shocks since Admission s/p TVP Overdrive Pacing    - s/p Active Fixation (1.22.24)    - Troponin Values Normal   Prolonged QT c  History of Nonischemic Cardiomyopathy EF 40-45%- Now Recovered with EF 55-60%    - Status Post Subcutaneous ICD    - Normal Coronaries in 2019  History of MI  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 2021)  Polysubstance Abuse- with impending acute withdraw     - Opioid Disorder (Heroin) & IV Meth Use    - Nicotine Dependence  History of Hepatitis C  HIV    - On Biktarvy  History of Right PCA Mycotic Aneurysm  History of Right Parieto-occipital ICH  DNR Status      Plan:   Decrease Lidocaine Infusion to 0.5mg/min  Active Fixation Precautions   Keep K > 4.0 and Mg > 2.0  Labs and EKG in AM: CBC, CMP and Mg    NICOLAS Baldwin  Cardiology  [facility]  01/23/2024     Physician addendum:  I have seen and examined this patient as a split-shared visit with the BAM d/t complicated medical management of above problems written in assessment and high acuity requiring physician expertise in medical decision-making. I performed the substantive portion of the history and exam. Above medical decision-making is also formulated by me.    Cardiovascular exam:  S1, S2  Lungs:  fine crackles at bases.  Extremities:  1+ edema bilaterally    Plan:  Episodes of torsade/VT.  QTC was prolonged in 700 range.  Currently paced rhythm. titer lidocaine down to stop.    Follow up labs.      Ayad Kwong MD  Cardiologist

## 2024-01-23 NOTE — CONSULTS
[facility]  Infectious Diseases Consult        ASSESSMENT & PLAN:     He is a 33-year-old male with a past medical history of AIDS, noncompliant with HAART, several episodes of endocarditis involving AICD, s/p AICD extraction and placement of SICD in 9/2021, mycotic cerebral aneurysm with associated ICH, IVDU with heroin, and hepatitis C, s/p treatment in 2019, who was brought to the emergency room on 01/21 from the [facility] after being found to have a V-fib arrest and was shocked by SICD.  Evaluated by cardiology, TVP placed for now andd patient on antaarythmics.  Regarding HIV, patient reports he was last seen by his provider Dr. Aston Montelongo approximately 1 month ago with labs at that time.  States he has been compliant with Biktarvy for at least the past 3 years, however review of prior records noted patient with longstanding history of noncompliance.  Current CD4 count 62/12%.  Also noted to have left thumb cellulitis at injection site, no concern for deep infection.  Received levofloxacin and vancomycin on admit, now on vancomycin alone.  Blood cultures negative thus far.  Patient currently without significant complaints.  Has been afebrile and hemodynamcially stable.  We have been consulted for assistance with HIV.     AIDS  L thumb cellulitis  Medical noncomplaince  V-fib arrest   VT storm  Prolonged QTc  SICD status w/concern for malfunction, s/p TVP placement  H/o endocarditis / mycotic cerebral anyeurysm and ICH  Active IVDU with heroin  H/o HCV, s/p treatment in 2019    PLAN:  Obtain records from Dr. Aston Montelongo and patient's pharmacy.   OK to hold off on resuming ART for now pending records.  Patient does not have home Biktarvy.  LA atovaquone.  Start Bactrim DS 1 PO BID x7 days for thumb cellulitis.  Then will plan to decrease to 1 tab daily for OI prophy.   Will need to f/u w/Dr. Aston Montelongo or [facility] [place] for HIV care.   Remainder of care per cardiology and ICU.  Discussed with patient and nursing.        HISTORY OF PRESENT ILLNESS:     He is a 33-year-old male with a past medical history of AIDS, noncompliant with HAART, several episodes of endocarditis involving AICD, s/p AICD extraction and placement of SICD in 9/2021, mycotic cerebral aneurysm with associated ICH, IVDU with heroin, and hepatitis C, s/p treatment in 2019, who was brought to the emergency room on [facility] from [facility] after being found to have a V-fib arrest and was shocked by SICD.  Evaluated by cardiology, TVP placed for now andd patient on antaarythmics.  Regarding HIV, patient reports he was last seen by his provider Dr. Aston Montelongo approximately 1 month ago with labs at that time.  States he has been compliant with Biktarvy for at least the past 3 years, however review of prior records noted patient with longstanding history of noncompliance.  Current CD4 count 62/12%.  Also noted to have left thumb cellulitis at injection site, no concern for deep infection.  Received levofloxacin and vancomycin on admit, now on vancomycin alone.  Blood cultures negative thus far.  Patient currently without significant complaints.  Has been afebrile and hemodynamcially stable.  We have been consulted for assistance with HIV.     PAST MEDICAL HISTORY:     Past Medical History:   Diagnosis Date    Cardiac arrest 08/2019    pulseless, AED required    Depression     Heart failure, type unknown     fr other facility medical record    Hepatitis C     HIV (human immunodeficiency virus infection)     HTN (hypertension)     IV drug user     Myocardial infarct, old     fr transfer medical records    Neuropathy     left foot    Overdose     heroine, meth    Pneumonia     Polysubstance abuse     heroin methadone    PTSD (post-traumatic stress disorder)     from prev medical record    Seizure 08/2019    POSSIBLE Seizure prior to cardiac arrest, unclear       PAST SURGICAL HISTORY:     Past Surgical History:   Procedure Laterality Date    CHEST TUBE INSERTION  Bilateral     bilat when intubated in the past (removed)    CORONARY ANGIOGRAPHY N/A 8/30/2019    Procedure: ANGIOGRAM, CORONARY ARTERY;  Surgeon: Misbah Culp MD;  Location: [facility] CATH;  Service: Cardiology;  Laterality: N/A;    GASTROSTOMY TUBE PLACEMENT      fr other facility medical record    INSERTION OF PACEMAKER  2007    INSERTION, PACEMAKER, TEMPORARY TRANSVENOUS Right 1/22/2024    Procedure: Insertion, Pacemaker, Temporary Transvenous;  Surgeon: Thad Interiano MD;  Location: [facility] CATH LAB;  Service: Cardiology;  Laterality: Right;    LEFT HEART CATHETERIZATION Left 8/30/2019    Procedure: Left heart cath;  Surgeon: Misbah Culp MD;  Location: [facility] CATH;  Service: Cardiology;  Laterality: Left;    REMOVAL OF PACEMAKER  2014    TRACHEOSTOMY      x2 in the past (removed/healed as of August 2019)       ALLERGIES:   Penicillins    FAMILY HISTORY:   Reviewed and non-contributory     SOCIAL HISTORY:     Social History     Tobacco Use    Smoking status: Every Day    Smokeless tobacco: Not on file   Substance Use Topics    Alcohol use: Not on file        MEDICATIONS:   Reviewed in EMR    REVIEW OF SYSTEMS:   Except as documented, all other systems reviewed and negative     PHYSICAL EXAM:   T 97.8 °F (36.6 °C)   BP 91/66   P 89   RR 10   O2 95 %  GENERAL: NAD; does not appear toxic  SKIN: no rash  HEENT: sclera non-icteric; PERRL   NECK: supple; no LAD  CHEST: CTA; nonlabored, equal expansion; no adventitious BS  CARDIOVASCULAR: RRR, S1S2; no murmur; strong, equal peripheral pulses; no edema  ABDOMEN:  active bowel sounds; abdomen soft, nondistended, nontender to palpation  GENITOURINARY: no suprapubic tenderness   EXTREMITIES: no cyanosis or clubbing; left thumb edematous and mildly erythematous, not warm or tender, no pain w/ROM  NEURO: AAO x4; CN II-XII grossly intact  PSYCH: Mentation and affect appropriate     LABS AND IMAGING:     Recent Labs     01/22/24  0119 01/23/24  0009   WBC 8.60 11.35   RBC 4.59*  "5.28   HGB 13.4* 15.2   HCT 38.5* 44.3   MCV 83.9 83.9   MCH 29.2 28.8   MCHC 34.8 34.3   RDW 12.8 13.0    165     Recent Labs     01/21/24 1923   LACTIC 2.1     No results for input(s): "INR", "APTT", "D-DIMER" in the last 72 hours.  No results for input(s): "HGBA1C", "CHOL", "TRIG", "LDL", "VLDL", "HDL" in the last 72 hours.   Recent Labs     01/21/24 1923 01/22/24  0119 01/22/24  0905 01/22/24  1834 01/23/24  0009    139  --   --  142   K 4.1 3.8   < > 4.1 4.1   CHLORIDE 103 107  --   --  113*   CO2 21* 20*  --   --  23   BUN 15.5 14.5  --   --  13.5   CREATININE 0.89 0.71*  --   --  0.82   GLUCOSE 105* 110*  --   --  147*   CALCIUM 9.9 8.8  --   --  9.3   MG 2.20 2.30   < > 2.20 2.20   PHOS  --  2.4  --   --  2.9   ALBUMIN 4.3 3.6  --   --  3.4*   GLOBULIN 4.8* 3.5  --   --  3.9*   ALKPHOS 91 79  --   --  72   ALT 8 7  --   --  6   AST 20 13  --   --  12   BILITOT 0.6 0.4  --   --  0.6   TSH 0.288*  --   --   --   --     < > = values in this interval not displayed.     Recent Labs     01/21/24 1923 01/22/24  0119 01/22/24  0905   *  --   --    TROPONINI <0.010 <0.010 <0.010          Electrophysiology Procedure    The patient tolerated procedure well.    The patient left the lab in stable condition.    There were no immediate complications.    Successful temporary pacing catheter placement.    I certify that I was present for the critical steps of the procedure   including the diagnostic, surgical and/or interventional portions.     Procedure Log documented by Documenter: Darryn Almonte RN and verified by   Thad Interiano MD.    Date: 1/22/2024  Time: 11:20 AM         NESSA Bocanegra  Infectious Diseases     "

## 2024-01-23 NOTE — PROGRESS NOTES
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 Patient recently incarcerated 2 days ago. Today he was found to be having seizure like activity in his cell and was brought to [facility] ED where he reportedly had 4 episodes of Vtach/vfib and was immediately cardioverted by his ICD. With subsequent shocks patient would apparently be awake  and alert but notably bradycardic. He was then transferred to [facility] due to the lack of cardiology services. Upon arrival to our ED patient again went into vtach and was shocked with return of normal circulation. Cardiology was consulted in the ED and they recommended he be started on Amiodarone. Additionally the ICD rep has been called out for interrogation of the device. Currently the patient denies any chest pain, shortness of breath, or nausea. However, he did experience these symptoms during the arthymmic events.     Hospital Course/Significant events:      24 Hour Interval History:  TVP placed yesterday no defibrillator shocks overnight.  No acute events overnight.  Afebrile with T-max 98.6° F. I&O 324/900 mL and past 24 hours.  On Precedex for agitation.  And remains on lidocaine  Morning CBC and CMP unremarkable.    Past Medical History:   Diagnosis Date    Cardiac arrest  08/2019    pulseless, AED required    Depression     Heart failure, type unknown     fr other facility medical record    Hepatitis C     HIV (human immunodeficiency virus infection)     HTN (hypertension)     IV drug user     Myocardial infarct, old     fr transfer medical records    Neuropathy     left foot    Overdose     heroine, meth    Pneumonia     Polysubstance abuse     heroin methadone    PTSD (post-traumatic stress disorder)     from prev medical record    Seizure 08/2019    POSSIBLE Seizure prior to cardiac arrest, unclear       Past Surgical History:   Procedure Laterality Date    CHEST TUBE INSERTION Bilateral     bilat when intubated in the past (removed)    CORONARY ANGIOGRAPHY N/A 8/30/2019    Procedure: ANGIOGRAM, CORONARY ARTERY;  Surgeon: Misbah Culp MD;  Location: [facility] CATH;  Service: Cardiology;  Laterality: N/A;    GASTROSTOMY TUBE PLACEMENT      fr other facility medical record    INSERTION OF PACEMAKER  2007    LEFT HEART CATHETERIZATION Left 8/30/2019    Procedure: Left heart cath;  Surgeon: Misbah Culp MD;  Location: [facility] CATH;  Service: Cardiology;  Laterality: Left;    REMOVAL OF PACEMAKER  2014    TRACHEOSTOMY      x2 in the past (removed/healed as of August 2019)       Social History     Socioeconomic History    Marital status: Single   Tobacco Use    Smoking status: Every Day   Substance and Sexual Activity    Drug use: Yes     Types: IV, Methamphetamines     Comment: heroine opiates           Current Outpatient Medications   Medication Instructions    albuterol (PROVENTIL/VENTOLIN HFA) 90 mcg/actuation inhaler 1 puff, Inhalation, As needed (PRN)    BIKTARVY -25 mg per tablet 1 tablet, Oral, Daily    buprenorphine-naloxone (SUBOXONE) 8-2 mg Film 1 each, Sublingual, Daily    busPIRone (BUSPAR) 30 MG Tab 1 tablet, Oral, 2 times daily    lidocaine (LIDODERM) 5 % 1 patch, Transdermal, Daily    LORazepam (ATIVAN) 0.5 mg, Oral, 3 times daily PRN    thiamine 100 mg, Oral, Daily        Current Inpatient Medications   atovaquone  1,500 mg Oral Daily    oentqejvn-tsnefygy-toiazqe ala  1 tablet Oral Daily    buprenorphine HCL  4 mg Sublingual Daily    enoxaparin  40 mg Subcutaneous Daily    famotidine  20 mg Oral BID    levoFLOXacin  750 mg Intravenous Q24H    LIDOcaine   Topical (Top) Once    scopolamine  1 patch Transdermal Q3 Days    thiamine  100 mg Oral Daily    vancomycin (VANCOCIN) IV (PEDS and ADULTS)  1,250 mg Intravenous Q8H       Current Intravenous Infusions   dexmedeTOMIDine (Precedex) infusion (titrating) 1.4 mcg/kg/hr (01/23/24 0525)    LIDOcaine 1 mg/min (01/22/24 1841)         Review of Systems   Constitutional:  Negative for chills and fever.   Eyes:  Negative for double vision.   Respiratory:  Negative for cough and shortness of breath.    Cardiovascular:  Negative for chest pain, palpitations and orthopnea.   Gastrointestinal:  Negative for abdominal pain, diarrhea, nausea and vomiting.   Musculoskeletal:  Negative for back pain, myalgias and neck pain.   Neurological:  Negative for dizziness and headaches.          Objective:       Intake/Output Summary (Last 24 hours) at 1/23/2024 0558  Last data filed at 1/22/2024 1841  Gross per 24 hour   Intake 324.6 ml   Output 900 ml   Net -575.4 ml         Vital Signs (Most Recent):  Temp: 98.6 °F (37 °C) (01/23/24 0000)  Pulse: 90 (01/23/24 0100)  Resp: (!) 21 (01/23/24 0100)  BP: (!) 78/64 (01/23/24 0100)  SpO2: 96 % (01/23/24 0100)  Body mass index is 19.89 kg/m².  Weight: 70.3 kg (155 lb) Vital Signs (24h Range):  Temp:  [98.5 °F (36.9 °C)-98.6 °F (37 °C)] 98.6 °F (37 °C)  Pulse:  [] 90  Resp:  [11-32] 21  SpO2:  [91 %-100 %] 96 %  BP: ()/(57-69) 78/64  Arterial Line BP: ()/(32-78) 82/54     Physical Exam  Vitals and nursing note reviewed.   Constitutional:       General: He is not in acute distress.     Appearance: He is normal weight. He is not ill-appearing or toxic-appearing.   HENT:      Head: Normocephalic  "and atraumatic.      Mouth/Throat:      Mouth: Mucous membranes are moist.      Pharynx: Oropharynx is clear.   Eyes:      Extraocular Movements: Extraocular movements intact.      Conjunctiva/sclera: Conjunctivae normal.      Pupils: Pupils are equal, round, and reactive to light.   Cardiovascular:      Rate and Rhythm: Regular rhythm. Bradycardia present.      Heart sounds: No murmur heard.  Pulmonary:      Effort: Pulmonary effort is normal. No respiratory distress.      Breath sounds: No wheezing or rhonchi.   Musculoskeletal:         General: No swelling.      Right lower leg: No edema.      Left lower leg: No edema.   Skin:     General: Skin is warm and dry.   Neurological:      General: No focal deficit present.      Mental Status: He is alert and oriented to person, place, and time.           Lines/Drains/Airways       Arterial Line  Duration             Arterial Line 01/22/24 0101 Left Radial 1 day              Peripheral Intravenous Line  Duration                  Peripheral IV - Single Lumen 01/22/24 0345 20 G Anterior;Left Forearm 1 day         Peripheral IV - Single Lumen 01/22/24 1459 Anterior;Distal;Right Upper Arm <1 day         Sheath 01/22/24 1108 Right <1 day                    Significant Labs:    Lab Results   Component Value Date    WBC 11.35 01/23/2024    HGB 15.2 01/23/2024    HCT 44.3 01/23/2024    MCV 83.9 01/23/2024     01/23/2024           BMP  Lab Results   Component Value Date     01/23/2024    K 4.1 01/23/2024    CHLORIDE 113 (H) 01/23/2024    CO2 23 01/23/2024    BUN 13.5 01/23/2024    CREATININE 0.82 01/23/2024    CALCIUM 9.3 01/23/2024    ESTGFRAFRICA >105 10/01/2021    EGFRNONAA >105 09/10/2021         ABG  No results for input(s): "PH", "PO2", "PCO2", "HCO3", "POCBASEDEF" in the last 168 hours.    Mechanical Ventilation Support:         Significant Imaging:  I have reviewed the pertinent imaging within the past 24 hours.        Assessment/Plan:     Assessment  VT " storm  Prolonged QTc  Hx of MI 2007 with HFrEF s/p AICD  Hx of recurrent endocarditis with multiple AICD replacements (most recent 9/21)  Opioid use disorder (Heroin)  Thumb infection  Hx of Hep C, HIV/AIDS, R PCA mycotic aneurysm, R parieto-occipital ICH      Plan  1.Cardiology following.  We will need to regroup for definitive plan  2. CD4 count 62 and 6%.  Started on atovaquone for OI Ppx.  Pending quant gold.  Consult ID  3. Continue Suboxone for withdrawals  4. XR of thumb without soft tissue abnormality.  But is on vanc and Levaquin.  Deescalate per culture and PCR        Patient electing to be DNR status.   I have spoken with the patient at length today about the patient's CODE STATUS.  I have fully informed him of the risks and benefits of CPR and mechanical ventilation.  We have also discussed the differences between FULL CODE, DNR/DNI and DNR. He, without coercion or bias, made a decision to make     Britton Solishop    CODE STATUS: DNR; status confirmed/order placed in chart     DVT Prophylaxis:Lovenox  GI Prophylaxis:famotidine     32 minutes of critical care was time spent personally by me on the following activities: development of treatment plan with patient or surrogate and bedside caregivers, discussions with consultants, evaluation of patient's response to treatment, examination of patient, ordering and performing treatments and interventions, ordering and review of laboratory studies, ordering and review of radiographic studies, pulse oximetry, re-evaluation of patient's condition.  This critical care time did not overlap with that of any other provider or involve time for any procedures.     Raz Musa DO  Pulmonary Critical Care Medicine  [facility] - Emergency Dept  DOS: 01/23/2024

## 2024-01-23 NOTE — NURSING
Nurses Note -- 4 Eyes      1/23/2024   9:13 AM      Skin assessed during: Q Shift Change      [x] No Altered Skin Integrity Present    []Prevention Measures Documented      [] Yes- Altered Skin Integrity Present or Discovered   [] LDA Added if Not in Epic (Describe Wound)   [] New Altered Skin Integrity was Present on Admit and Documented in LDA   [] Wound Image Taken    Wound Care Consulted? No    Attending Nurse:  Radhames Linares rn     Second RN/Staff Member:   Laura Loyola

## 2024-01-24 LAB
ALBUMIN SERPL-MCNC: 3.2 G/DL (ref 3.5–5)
ALBUMIN/GLOB SERPL: 1 RATIO (ref 1.1–2)
ALP SERPL-CCNC: 65 UNIT/L (ref 40–150)
ALT SERPL-CCNC: 7 UNIT/L (ref 0–55)
ANION GAP SERPL CALC-SCNC: 10 MEQ/L
AST SERPL-CCNC: 17 UNIT/L (ref 5–34)
BASOPHILS # BLD AUTO: 0.02 X10(3)/MCL
BASOPHILS NFR BLD AUTO: 0.2 %
BILIRUB SERPL-MCNC: 0.5 MG/DL
BUN SERPL-MCNC: 14.1 MG/DL (ref 8.9–20.6)
BUN SERPL-MCNC: 8.6 MG/DL (ref 8.9–20.6)
CALCIUM SERPL-MCNC: 8.3 MG/DL (ref 8.4–10.2)
CALCIUM SERPL-MCNC: 8.7 MG/DL (ref 8.4–10.2)
CHLORIDE SERPL-SCNC: 110 MMOL/L (ref 98–107)
CHLORIDE SERPL-SCNC: 110 MMOL/L (ref 98–107)
CO2 SERPL-SCNC: 21 MMOL/L (ref 22–29)
CO2 SERPL-SCNC: 23 MMOL/L (ref 22–29)
CREAT SERPL-MCNC: 0.82 MG/DL (ref 0.73–1.18)
CREAT SERPL-MCNC: 0.86 MG/DL (ref 0.73–1.18)
CREAT/UREA NIT SERPL: 10
EOSINOPHIL # BLD AUTO: 0.01 X10(3)/MCL (ref 0–0.9)
EOSINOPHIL NFR BLD AUTO: 0.1 %
ERYTHROCYTE [DISTWIDTH] IN BLOOD BY AUTOMATED COUNT: 12.9 % (ref 11.5–17)
GFR SERPLBLD CREATININE-BSD FMLA CKD-EPI: >60 MLS/MIN/1.73/M2
GFR SERPLBLD CREATININE-BSD FMLA CKD-EPI: >60 MLS/MIN/1.73/M2
GLOBULIN SER-MCNC: 3.3 GM/DL (ref 2.4–3.5)
GLUCOSE SERPL-MCNC: 108 MG/DL (ref 74–100)
GLUCOSE SERPL-MCNC: 97 MG/DL (ref 74–100)
HCT VFR BLD AUTO: 40.9 % (ref 42–52)
HGB BLD-MCNC: 14.6 G/DL (ref 14–18)
IMM GRANULOCYTES # BLD AUTO: 0.01 X10(3)/MCL (ref 0–0.04)
IMM GRANULOCYTES NFR BLD AUTO: 0.1 %
LYMPHOCYTES # BLD AUTO: 2.11 X10(3)/MCL (ref 0.6–4.6)
LYMPHOCYTES NFR BLD AUTO: 24.5 %
MAGNESIUM SERPL-MCNC: 2 MG/DL (ref 1.6–2.6)
MCH RBC QN AUTO: 28.9 PG (ref 27–31)
MCHC RBC AUTO-ENTMCNC: 35.7 G/DL (ref 33–36)
MCV RBC AUTO: 81 FL (ref 80–94)
MONOCYTES # BLD AUTO: 0.46 X10(3)/MCL (ref 0.1–1.3)
MONOCYTES NFR BLD AUTO: 5.3 %
NEUTROPHILS # BLD AUTO: 5.99 X10(3)/MCL (ref 2.1–9.2)
NEUTROPHILS NFR BLD AUTO: 69.8 %
NRBC BLD AUTO-RTO: 0 %
PHOSPHATE SERPL-MCNC: 3.7 MG/DL (ref 2.3–4.7)
PLATELET # BLD AUTO: 142 X10(3)/MCL (ref 130–400)
PMV BLD AUTO: 10.6 FL (ref 7.4–10.4)
POTASSIUM SERPL-SCNC: 3.1 MMOL/L (ref 3.5–5.1)
POTASSIUM SERPL-SCNC: 3.5 MMOL/L (ref 3.5–5.1)
PROT SERPL-MCNC: 6.5 GM/DL (ref 6.4–8.3)
RBC # BLD AUTO: 5.05 X10(6)/MCL (ref 4.7–6.1)
SODIUM SERPL-SCNC: 141 MMOL/L (ref 136–145)
SODIUM SERPL-SCNC: 143 MMOL/L (ref 136–145)
TROPONIN I SERPL-MCNC: 0.15 NG/ML (ref 0–0.04)
TROPONIN I SERPL-MCNC: 0.18 NG/ML (ref 0–0.04)
WBC # SPEC AUTO: 8.6 X10(3)/MCL (ref 4.5–11.5)

## 2024-01-24 PROCEDURE — 25000003 PHARM REV CODE 250: Performed by: INTERNAL MEDICINE

## 2024-01-24 PROCEDURE — 87536 HIV-1 QUANT&REVRSE TRNSCRPJ: CPT | Performed by: GENERAL PRACTICE

## 2024-01-24 PROCEDURE — 25000003 PHARM REV CODE 250

## 2024-01-24 PROCEDURE — 85025 COMPLETE CBC W/AUTO DIFF WBC: CPT | Performed by: INTERNAL MEDICINE

## 2024-01-24 PROCEDURE — 63600175 PHARM REV CODE 636 W HCPCS: Performed by: INTERNAL MEDICINE

## 2024-01-24 PROCEDURE — 93005 ELECTROCARDIOGRAM TRACING: CPT

## 2024-01-24 PROCEDURE — 99900035 HC TECH TIME PER 15 MIN (STAT)

## 2024-01-24 PROCEDURE — 63600175 PHARM REV CODE 636 W HCPCS

## 2024-01-24 PROCEDURE — 80053 COMPREHEN METABOLIC PANEL: CPT | Performed by: INTERNAL MEDICINE

## 2024-01-24 PROCEDURE — 99291 CRITICAL CARE FIRST HOUR: CPT | Mod: ,,, | Performed by: GENERAL PRACTICE

## 2024-01-24 PROCEDURE — 25000003 PHARM REV CODE 250: Performed by: NURSE PRACTITIONER

## 2024-01-24 PROCEDURE — 84100 ASSAY OF PHOSPHORUS: CPT | Performed by: INTERNAL MEDICINE

## 2024-01-24 PROCEDURE — 94761 N-INVAS EAR/PLS OXIMETRY MLT: CPT

## 2024-01-24 PROCEDURE — 83735 ASSAY OF MAGNESIUM: CPT | Performed by: INTERNAL MEDICINE

## 2024-01-24 PROCEDURE — 93010 ELECTROCARDIOGRAM REPORT: CPT | Mod: ,,, | Performed by: INTERNAL MEDICINE

## 2024-01-24 PROCEDURE — 84484 ASSAY OF TROPONIN QUANT: CPT

## 2024-01-24 PROCEDURE — 20000000 HC ICU ROOM

## 2024-01-24 RX ORDER — CALCIUM CARBONATE 200(500)MG
500 TABLET,CHEWABLE ORAL DAILY PRN
Status: DISCONTINUED | OUTPATIENT
Start: 2024-01-24 | End: 2024-01-27 | Stop reason: HOSPADM

## 2024-01-24 RX ORDER — DIPHENHYDRAMINE HYDROCHLORIDE 50 MG/ML
25 INJECTION INTRAMUSCULAR; INTRAVENOUS ONCE
Status: COMPLETED | OUTPATIENT
Start: 2024-01-25 | End: 2024-01-24

## 2024-01-24 RX ORDER — DIPHENHYDRAMINE HYDROCHLORIDE 50 MG/ML
6.25 INJECTION INTRAMUSCULAR; INTRAVENOUS ONCE
Status: COMPLETED | OUTPATIENT
Start: 2024-01-24 | End: 2024-01-24

## 2024-01-24 RX ORDER — PROCHLORPERAZINE EDISYLATE 5 MG/ML
2.5 INJECTION INTRAMUSCULAR; INTRAVENOUS ONCE
Status: COMPLETED | OUTPATIENT
Start: 2024-01-25 | End: 2024-01-24

## 2024-01-24 RX ORDER — SULFAMETHOXAZOLE AND TRIMETHOPRIM 800; 160 MG/1; MG/1
1 TABLET ORAL 2 TIMES DAILY
Status: COMPLETED | OUTPATIENT
Start: 2024-01-24 | End: 2024-01-26

## 2024-01-24 RX ORDER — ALPRAZOLAM 0.25 MG/1
0.25 TABLET ORAL 3 TIMES DAILY
Status: DISCONTINUED | OUTPATIENT
Start: 2024-01-24 | End: 2024-01-27 | Stop reason: HOSPADM

## 2024-01-24 RX ORDER — BISMUTH SUBSALICYLATE 525 MG/30ML
30 LIQUID ORAL ONCE
Status: DISCONTINUED | OUTPATIENT
Start: 2024-01-24 | End: 2024-01-24

## 2024-01-24 RX ORDER — KETOROLAC TROMETHAMINE 30 MG/ML
30 INJECTION, SOLUTION INTRAMUSCULAR; INTRAVENOUS ONCE
Status: COMPLETED | OUTPATIENT
Start: 2024-01-24 | End: 2024-01-24

## 2024-01-24 RX ORDER — BISMUTH SUBSALICYLATE 525 MG/30ML
30 LIQUID ORAL DAILY PRN
Status: DISCONTINUED | OUTPATIENT
Start: 2024-01-24 | End: 2024-01-27 | Stop reason: HOSPADM

## 2024-01-24 RX ORDER — SIMETHICONE 80 MG
1 TABLET,CHEWABLE ORAL ONCE
Status: COMPLETED | OUTPATIENT
Start: 2024-01-24 | End: 2024-01-24

## 2024-01-24 RX ORDER — DIPHENHYDRAMINE HCL 25 MG
50 CAPSULE ORAL ONCE
Status: DISCONTINUED | OUTPATIENT
Start: 2024-01-24 | End: 2024-01-24

## 2024-01-24 RX ADMIN — TRAZODONE HYDROCHLORIDE 100 MG: 100 TABLET ORAL at 08:01

## 2024-01-24 RX ADMIN — KETOROLAC TROMETHAMINE 30 MG: 30 INJECTION, SOLUTION INTRAMUSCULAR; INTRAVENOUS at 05:01

## 2024-01-24 RX ADMIN — ALPRAZOLAM 0.25 MG: 0.25 TABLET ORAL at 08:01

## 2024-01-24 RX ADMIN — PROCHLORPERAZINE EDISYLATE 2.5 MG: 5 INJECTION INTRAMUSCULAR; INTRAVENOUS at 11:01

## 2024-01-24 RX ADMIN — POTASSIUM CHLORIDE 20 MEQ: 10 INJECTION, SOLUTION INTRAVENOUS at 10:01

## 2024-01-24 RX ADMIN — FAMOTIDINE 20 MG: 20 TABLET, FILM COATED ORAL at 09:01

## 2024-01-24 RX ADMIN — SODIUM CHLORIDE, POTASSIUM CHLORIDE, SODIUM LACTATE AND CALCIUM CHLORIDE 1000 ML: 600; 310; 30; 20 INJECTION, SOLUTION INTRAVENOUS at 09:01

## 2024-01-24 RX ADMIN — DEXMEDETOMIDINE HYDROCHLORIDE 1.4 MCG/KG/HR: 400 INJECTION INTRAVENOUS at 01:01

## 2024-01-24 RX ADMIN — THIAMINE HCL TAB 100 MG 100 MG: 100 TAB at 09:01

## 2024-01-24 RX ADMIN — FAMOTIDINE 20 MG: 20 TABLET, FILM COATED ORAL at 08:01

## 2024-01-24 RX ADMIN — ALPRAZOLAM 0.25 MG: 0.25 TABLET ORAL at 04:01

## 2024-01-24 RX ADMIN — SULFAMETHOXAZOLE AND TRIMETHOPRIM 1 TABLET: 800; 160 TABLET ORAL at 08:01

## 2024-01-24 RX ADMIN — SIMETHICONE 80 MG: 80 TABLET, CHEWABLE ORAL at 09:01

## 2024-01-24 RX ADMIN — EMTRICITABINE AND TENOFOVIR ALAFENAMIDE 1 TABLET: 200; 25 TABLET ORAL at 04:01

## 2024-01-24 RX ADMIN — BUSPIRONE HYDROCHLORIDE 30 MG: 15 TABLET ORAL at 08:01

## 2024-01-24 RX ADMIN — DEXMEDETOMIDINE HYDROCHLORIDE 1.4 MCG/KG/HR: 400 INJECTION INTRAVENOUS at 05:01

## 2024-01-24 RX ADMIN — BUPRENORPHINE 8 MG: 8 TABLET SUBLINGUAL at 09:01

## 2024-01-24 RX ADMIN — BUSPIRONE HYDROCHLORIDE 30 MG: 15 TABLET ORAL at 09:01

## 2024-01-24 RX ADMIN — DOLUTEGRAVIR SODIUM 50 MG: 50 TABLET, FILM COATED ORAL at 04:01

## 2024-01-24 RX ADMIN — DIPHENHYDRAMINE HYDROCHLORIDE 6.25 MG: 50 INJECTION INTRAMUSCULAR; INTRAVENOUS at 05:01

## 2024-01-24 RX ADMIN — DIPHENHYDRAMINE HYDROCHLORIDE 25 MG: 50 INJECTION INTRAMUSCULAR; INTRAVENOUS at 11:01

## 2024-01-24 RX ADMIN — SULFAMETHOXAZOLE AND TRIMETHOPRIM 1 TABLET: 800; 160 TABLET ORAL at 09:01

## 2024-01-24 NOTE — PROGRESS NOTES
"  [facility]    Cardiology  Consult Note    Patient Name: Britton Perez  MRN: [id number]  Admission Date: 1/21/2024  Hospital Length of Stay: 3 days  Code Status: DNR   Attending Provider: Cami Gallardo MD   Consulting Provider: NICOLAS Baldwin  Primary Care Physician: Kike Muller MD  Principal Problem:VT (ventricular tachycardia)    Patient information was obtained from patient, past medical records, ER records, and primary team.     Subjective:   Consultation Reason: VT/VF Arrest Requiring ICD Shock    HPI: Mr. Perez is a 33 year old male, unknown to [facility], who presented to the hospital from long term after having been found down with seizure like activity. He was brought to [facility] ED where he reportedly had 4 episodes of Vtach/vfib and was immediately cardioverted by his ICD. With subsequent shocks patient would apparently be awake  and alert but notably bradycardic. He was then transferred to [facility] due to the lack of cardiology services. When he arrived at [facility] he went into VT yet again and was shocked by his device. [facility] was consulted and recommended amiodarone infusion. He is admitted to ICU for close monitoring. [facility] is consulted for cardiac evaluation/management.    1.23.24: NAD. Resting. No Ectopy Noted.   1.24.24: NAD. No Ectopy, Continues to be Overdriven via Pacemaker (TVP). "I am fine." Denies CP, SOB and Palps. + Dizziness with Position Changes. PT stood up to Urinate and became Hypotensive. IVF Bolus in Progress.     PMH: Hypertension, Hepatitis C, HIV, Polysubstance Abuse, PTSD, Seizures, Neuropathy, Old MI, Depression, Cardiac Arrest/ICD, NICMO  PSH:  LHC, SQ ICD, Device Placement & Removal, Tracheostomy with Removal, Chest Tube Insertion/Removal  Family History: None  Social History: Tobacco- Active Use, Positive for Polysubstance Abuse- IV Methamphetamine Use, Alcohol- Negative    Previous Cardiac Diagnostics:   Echocardiogram (1.22.24):  Left Ventricle: The left ventricle is normal " in size. Normal wall thickness. Normal wall motion. There is normal systolic function with a visually estimated ejection fraction of 55 - 60%. There is normal diastolic function.  Right Ventricle: Normal right ventricular cavity size. Systolic function is normal. TAPSE is 2.04 cm.    Echocardiogram (8.15.21):  RVSP ~ 30mmHg   Normal left ventricle cavity size. Normal wall thickness. Mild to   moderately (40-45%) decreased ejection fraction.   Moderate mitral regurgitation.   Mild to moderate tricuspid regurgitation. Mild pulmonary hypertension present.     Coronary Angiogram (8.30.19):  Angiographically normal coronary arteries.  LV end diastolic pressure is normal.    Review of patient's allergies indicates:   Allergen Reactions    Penicillins      No current facility-administered medications on file prior to encounter.     Current Outpatient Medications on File Prior to Encounter   Medication Sig    albuterol (PROVENTIL/VENTOLIN HFA) 90 mcg/actuation inhaler Inhale 1 puff into the lungs as needed.    BIKTARVY -25 mg per tablet Take 1 tablet by mouth once daily.    buprenorphine-naloxone (SUBOXONE) 8-2 mg Film Place 1 each under the tongue once daily.     busPIRone (BUSPAR) 30 MG Tab Take 1 tablet by mouth 2 (two) times daily.    lidocaine (LIDODERM) 5 % Place 1 patch onto the skin once daily.    LORazepam (ATIVAN) 0.5 MG tablet Take 1 tablet (0.5 mg total) by mouth 3 (three) times daily as needed (withdrawal symptoms).    thiamine 100 MG tablet Take 1 tablet (100 mg total) by mouth once daily.     Review of Systems   Constitutional:  Positive for fatigue.   Respiratory:  Negative for cough, chest tightness and shortness of breath.    Gastrointestinal:  Negative for nausea and vomiting.   Neurological:  Positive for dizziness.   All other systems reviewed and are negative.    Objective:     Vital Signs (Most Recent):  Temp: 98.6 °F (37 °C) (01/24/24 0000)  Pulse: 89 (01/24/24 0700)  Resp: 12 (01/24/24  0700)  BP: 126/77 (01/24/24 0200)  SpO2: 96 % (01/24/24 0700) Vital Signs (24h Range):  Temp:  [97.8 °F (36.6 °C)-98.6 °F (37 °C)] 98.6 °F (37 °C)  Pulse:  [89-93] 89  Resp:  [5-47] 12  SpO2:  [92 %-98 %] 96 %  BP: ()/(40-92) 126/77  Arterial Line BP: ()/(18-80) 97/63     Weight: 70.3 kg (155 lb)  Body mass index is 19.89 kg/m².    SpO2: 96 %         Intake/Output Summary (Last 24 hours) at 1/24/2024 0814  Last data filed at 1/24/2024 0729  Gross per 24 hour   Intake 1078.53 ml   Output 1900 ml   Net -821.47 ml       Lines/Drains/Airways       Arterial Line  Duration             Arterial Line 01/22/24 0101 Left Radial 2 days              Peripheral Intravenous Line  Duration                  Peripheral IV - Single Lumen 01/22/24 0345 20 G Anterior;Left Forearm 2 days         Peripheral IV - Single Lumen 01/22/24 1459 Anterior;Distal;Right Upper Arm 1 day         Sheath 01/22/24 1108 Right 1 day                  Significant Labs:  Recent Results (from the past 72 hour(s))   COVID/FLU A&B PCR    Collection Time: 01/21/24  6:57 PM   Result Value Ref Range    Influenza A PCR Not Detected Not Detected    Influenza B PCR Not Detected Not Detected    SARS-CoV-2 PCR Not Detected Not Detected, Negative   POCT glucose    Collection Time: 01/21/24  7:19 PM   Result Value Ref Range    POCT Glucose 104 70 - 110 mg/dL   Comprehensive metabolic panel    Collection Time: 01/21/24  7:23 PM   Result Value Ref Range    Sodium Level 138 136 - 145 mmol/L    Potassium Level 4.1 3.5 - 5.1 mmol/L    Chloride 103 98 - 107 mmol/L    Carbon Dioxide 21 (L) 22 - 29 mmol/L    Glucose Level 105 (H) 74 - 100 mg/dL    Blood Urea Nitrogen 15.5 8.9 - 20.6 mg/dL    Creatinine 0.89 0.73 - 1.18 mg/dL    Calcium Level Total 9.9 8.4 - 10.2 mg/dL    Protein Total 9.1 (H) 6.4 - 8.3 gm/dL    Albumin Level 4.3 3.5 - 5.0 g/dL    Globulin 4.8 (H) 2.4 - 3.5 gm/dL    Albumin/Globulin Ratio 0.9 (L) 1.1 - 2.0 ratio    Bilirubin Total 0.6 <=1.5 mg/dL     Alkaline Phosphatase 91 40 - 150 unit/L    Alanine Aminotransferase 8 0 - 55 unit/L    Aspartate Aminotransferase 20 5 - 34 unit/L    eGFR >60 mls/min/1.73/m2   Magnesium    Collection Time: 01/21/24  7:23 PM   Result Value Ref Range    Magnesium Level 2.20 1.60 - 2.60 mg/dL   Troponin I    Collection Time: 01/21/24  7:23 PM   Result Value Ref Range    Troponin-I <0.010 0.000 - 0.045 ng/mL   CK-MB    Collection Time: 01/21/24  7:23 PM   Result Value Ref Range    Creatine Kinase MB 4.3 <=7.2 ng/mL   CK    Collection Time: 01/21/24  7:23 PM   Result Value Ref Range    Creatine Kinase 338 (H) 30 - 200 U/L   Lactic acid, plasma    Collection Time: 01/21/24  7:23 PM   Result Value Ref Range    Lactic Acid Level 2.1 0.5 - 2.2 mmol/L   CBC with Differential    Collection Time: 01/21/24  7:23 PM   Result Value Ref Range    WBC 8.96 4.50 - 11.50 x10(3)/mcL    RBC 5.05 4.70 - 6.10 x10(6)/mcL    Hgb 14.9 14.0 - 18.0 g/dL    Hct 42.3 42.0 - 52.0 %    MCV 83.8 80.0 - 94.0 fL    MCH 29.5 27.0 - 31.0 pg    MCHC 35.2 33.0 - 36.0 g/dL    RDW 12.8 11.5 - 17.0 %    Platelet 202 130 - 400 x10(3)/mcL    MPV 9.8 7.4 - 10.4 fL    Neut % 85.7 %    Lymph % 11.9 %    Mono % 2.1 %    Eos % 0.0 %    Basophil % 0.1 %    Lymph # 1.07 0.6 - 4.6 x10(3)/mcL    Neut # 7.67 2.1 - 9.2 x10(3)/mcL    Mono # 0.19 0.1 - 1.3 x10(3)/mcL    Eos # 0.00 0 - 0.9 x10(3)/mcL    Baso # 0.01 <=0.2 x10(3)/mcL    IG# 0.02 0 - 0.04 x10(3)/mcL    IG% 0.2 %    NRBC% 0.0 %   TSH    Collection Time: 01/21/24  7:23 PM   Result Value Ref Range    TSH 0.288 (L) 0.350 - 4.940 uIU/mL   T4, Free    Collection Time: 01/21/24  7:23 PM   Result Value Ref Range    Thyroxine Free 1.16 0.70 - 1.48 ng/dL   Hepatitis C Antibody    Collection Time: 01/21/24  7:23 PM   Result Value Ref Range    Hep C Ab Interp Reactive (A) Nonreactive   Light Blue Top Hold    Collection Time: 01/21/24  7:27 PM   Result Value Ref Range    Extra Tube Hold for add-ons.    Gold Top Hold    Collection Time:  01/21/24  7:28 PM   Result Value Ref Range    Extra Tube Hold for add-ons.    Echo Saline Bubble? No    Collection Time: 01/22/24 12:35 AM   Result Value Ref Range    BSA 1.92 m2    Suero's Biplane MOD Ejection Fraction 58 %    LVIDd 4.80 3.5 - 6.0 cm    LV Systolic Volume 41.00 mL    LV Systolic Volume Index 21.0 mL/m2    LVIDs 3.20 2.1 - 4.0 cm    LV Diastolic Volume 108.00 mL    LV Diastolic Volume Index 55.38 mL/m2    IVS 0.80 0.6 - 1.1 cm    FS 33 28 - 44 %    Left Ventricle Relative Wall Thickness 0.46 cm    Posterior Wall 1.10 0.6 - 1.1 cm    LV mass 158.82 g    LV Mass Index 81 g/m2    MV Peak E Freddie 0.91 m/s    TDI LATERAL 0.11 m/s    TDI SEPTAL 0.11 m/s    E/E' ratio 8.27 m/s    MV Peak A Freddie 0.54 m/s    TR Max Freddie 1.33 m/s    E/A ratio 1.69     E wave deceleration time 214.00 msec    LV SEPTAL E/E' RATIO 8.27 m/s    LV LATERAL E/E' RATIO 8.27 m/s    LVOT peak freddie 0.73 m/s    Left Ventricular Outflow Tract Mean Velocity 0.46 cm/s    Left Ventricular Outflow Tract Mean Gradient 1.00 mmHg    TAPSE 2.04 cm    LA size 3.20 cm    LA volume (mod) 37.80 cm3    LA Volume Index (Mod) 19.4 mL/m2    AV mean gradient 5 mmHg    AV peak gradient 9 mmHg    Ao peak freddie 1.50 m/s    Ao VTI 28.70 cm    LVOT peak VTI 14.80 cm    AV Velocity Ratio 0.49     AV index (prosthetic) 0.52     Triscuspid Valve Regurgitation Peak Gradient 7 mmHg    Mean e' 0.11 m/s    ZLVIDS -0.52     ZLVIDD -1.47    Magnesium    Collection Time: 01/22/24  1:19 AM   Result Value Ref Range    Magnesium Level 2.30 1.60 - 2.60 mg/dL   CD4 Lymphocytes    Collection Time: 01/22/24  1:19 AM   Result Value Ref Range    Patient Age 33     WBC Absolute 8,600 4,500 - 11,500 /mm3    Lymph Percent 12 (L) 28 - 48 %    Lymph Absolute 1,032 (L) 1,260 - 5,520 x10(3)/mcL    CD4 % 6.0 %    CD4 Absolute 62 (L) 589 - 1,505 unit/L    T Cell Interp       Decreased absolute lymphocyte count with decreased absolute CD4+ lymphocyte count.    Dwaine Howell M.D.     Troponin I    Collection Time: 01/22/24  1:19 AM   Result Value Ref Range    Troponin-I <0.010 0.000 - 0.045 ng/mL   Phosphorus    Collection Time: 01/22/24  1:19 AM   Result Value Ref Range    Phosphorus Level 2.4 2.3 - 4.7 mg/dL   Comprehensive Metabolic Panel    Collection Time: 01/22/24  1:19 AM   Result Value Ref Range    Sodium Level 139 136 - 145 mmol/L    Potassium Level 3.8 3.5 - 5.1 mmol/L    Chloride 107 98 - 107 mmol/L    Carbon Dioxide 20 (L) 22 - 29 mmol/L    Glucose Level 110 (H) 74 - 100 mg/dL    Blood Urea Nitrogen 14.5 8.9 - 20.6 mg/dL    Creatinine 0.71 (L) 0.73 - 1.18 mg/dL    Calcium Level Total 8.8 8.4 - 10.2 mg/dL    Protein Total 7.1 6.4 - 8.3 gm/dL    Albumin Level 3.6 3.5 - 5.0 g/dL    Globulin 3.5 2.4 - 3.5 gm/dL    Albumin/Globulin Ratio 1.0 (L) 1.1 - 2.0 ratio    Bilirubin Total 0.4 <=1.5 mg/dL    Alkaline Phosphatase 79 40 - 150 unit/L    Alanine Aminotransferase 7 0 - 55 unit/L    Aspartate Aminotransferase 13 5 - 34 unit/L    eGFR >60 mls/min/1.73/m2   CBC with Differential    Collection Time: 01/22/24  1:19 AM   Result Value Ref Range    WBC 8.60 4.50 - 11.50 x10(3)/mcL    RBC 4.59 (L) 4.70 - 6.10 x10(6)/mcL    Hgb 13.4 (L) 14.0 - 18.0 g/dL    Hct 38.5 (L) 42.0 - 52.0 %    MCV 83.9 80.0 - 94.0 fL    MCH 29.2 27.0 - 31.0 pg    MCHC 34.8 33.0 - 36.0 g/dL    RDW 12.8 11.5 - 17.0 %    Platelet 164 130 - 400 x10(3)/mcL    MPV 10.1 7.4 - 10.4 fL    Neut % 85.9 %    Lymph % 11.5 %    Mono % 2.3 %    Eos % 0.0 %    Basophil % 0.1 %    Lymph # 0.99 0.6 - 4.6 x10(3)/mcL    Neut # 7.38 2.1 - 9.2 x10(3)/mcL    Mono # 0.20 0.1 - 1.3 x10(3)/mcL    Eos # 0.00 0 - 0.9 x10(3)/mcL    Baso # 0.01 <=0.2 x10(3)/mcL    IG# 0.02 0 - 0.04 x10(3)/mcL    IG% 0.2 %    NRBC% 0.0 %   HCV RNA Detect/Quant Reflex Colette    Collection Time: 01/22/24  1:19 AM   Result Value Ref Range    HCV RNA Detect/Quant, S Undetected Undetected IU/mL   Magnesium    Collection Time: 01/22/24  9:05 AM   Result Value Ref Range     Magnesium Level 2.10 1.60 - 2.60 mg/dL   Potassium    Collection Time: 01/22/24  9:05 AM   Result Value Ref Range    Potassium Level 3.9 3.5 - 5.1 mmol/L   Troponin I    Collection Time: 01/22/24  9:05 AM   Result Value Ref Range    Troponin-I <0.010 0.000 - 0.045 ng/mL   Blood Culture    Collection Time: 01/22/24  9:14 AM    Specimen: Arterial Blood Line   Result Value Ref Range    CULTURE, BLOOD ([facility]) No Growth At 24 Hours    Blood Culture    Collection Time: 01/22/24  6:34 PM    Specimen: Arm, Right; Blood   Result Value Ref Range    CULTURE, BLOOD ([facility]) No Growth At 24 Hours    Magnesium    Collection Time: 01/22/24  6:34 PM   Result Value Ref Range    Magnesium Level 2.20 1.60 - 2.60 mg/dL   Potassium    Collection Time: 01/22/24  6:34 PM   Result Value Ref Range    Potassium Level 4.1 3.5 - 5.1 mmol/L   SYPHILIS ANTIBODY (WITH REFLEX RPR)    Collection Time: 01/22/24  6:34 PM   Result Value Ref Range    Syphilis Antibody Nonreactive Nonreactive, Equivocal   Magnesium    Collection Time: 01/23/24 12:09 AM   Result Value Ref Range    Magnesium Level 2.20 1.60 - 2.60 mg/dL   Comprehensive Metabolic Panel    Collection Time: 01/23/24 12:09 AM   Result Value Ref Range    Sodium Level 142 136 - 145 mmol/L    Potassium Level 4.1 3.5 - 5.1 mmol/L    Chloride 113 (H) 98 - 107 mmol/L    Carbon Dioxide 23 22 - 29 mmol/L    Glucose Level 147 (H) 74 - 100 mg/dL    Blood Urea Nitrogen 13.5 8.9 - 20.6 mg/dL    Creatinine 0.82 0.73 - 1.18 mg/dL    Calcium Level Total 9.3 8.4 - 10.2 mg/dL    Protein Total 7.3 6.4 - 8.3 gm/dL    Albumin Level 3.4 (L) 3.5 - 5.0 g/dL    Globulin 3.9 (H) 2.4 - 3.5 gm/dL    Albumin/Globulin Ratio 0.9 (L) 1.1 - 2.0 ratio    Bilirubin Total 0.6 <=1.5 mg/dL    Alkaline Phosphatase 72 40 - 150 unit/L    Alanine Aminotransferase 6 0 - 55 unit/L    Aspartate Aminotransferase 12 5 - 34 unit/L    eGFR >60 mls/min/1.73/m2   Phosphorus    Collection Time: 01/23/24 12:09 AM   Result Value Ref Range     Phosphorus Level 2.9 2.3 - 4.7 mg/dL   CBC with Differential    Collection Time: 01/23/24 12:09 AM   Result Value Ref Range    WBC 11.35 4.50 - 11.50 x10(3)/mcL    RBC 5.28 4.70 - 6.10 x10(6)/mcL    Hgb 15.2 14.0 - 18.0 g/dL    Hct 44.3 42.0 - 52.0 %    MCV 83.9 80.0 - 94.0 fL    MCH 28.8 27.0 - 31.0 pg    MCHC 34.3 33.0 - 36.0 g/dL    RDW 13.0 11.5 - 17.0 %    Platelet 165 130 - 400 x10(3)/mcL    MPV 9.6 7.4 - 10.4 fL    Neut % 79.3 %    Lymph % 13.4 %    Mono % 6.8 %    Eos % 0.0 %    Basophil % 0.2 %    Lymph # 1.52 0.6 - 4.6 x10(3)/mcL    Neut # 9.01 2.1 - 9.2 x10(3)/mcL    Mono # 0.77 0.1 - 1.3 x10(3)/mcL    Eos # 0.00 0 - 0.9 x10(3)/mcL    Baso # 0.02 <=0.2 x10(3)/mcL    IG# 0.03 0 - 0.04 x10(3)/mcL    IG% 0.3 %    NRBC% 0.0 %   VANCOMYCIN, TROUGH    Collection Time: 01/23/24  9:51 AM   Result Value Ref Range    Vancomycin Trough 35.3 (HH) 15.0 - 20.0 ug/ml   MRSA PCR    Collection Time: 01/23/24  1:23 PM   Result Value Ref Range    MRSA PCR SCRN ([facility]) Not Detected Not Detected   Vancomycin, Random    Collection Time: 01/23/24  6:24 PM   Result Value Ref Range    Vanc Lvl Random 15.9 15.0 - 20.0 ug/ml   Troponin I    Collection Time: 01/23/24  6:24 PM   Result Value Ref Range    Troponin-I 0.142 (H) 0.000 - 0.045 ng/mL   APTT    Collection Time: 01/23/24  8:23 PM   Result Value Ref Range    PTT 28.1 23.2 - 33.7 seconds   Protime-INR    Collection Time: 01/23/24  8:23 PM   Result Value Ref Range    PT 16.6 (H) 12.5 - 14.5 seconds    INR 1.4 (H) <=1.3   Troponin I    Collection Time: 01/24/24 12:07 AM   Result Value Ref Range    Troponin-I 0.148 (H) 0.000 - 0.045 ng/mL   Magnesium    Collection Time: 01/24/24 12:07 AM   Result Value Ref Range    Magnesium Level 2.00 1.60 - 2.60 mg/dL   Phosphorus    Collection Time: 01/24/24 12:07 AM   Result Value Ref Range    Phosphorus Level 3.7 2.3 - 4.7 mg/dL   Comprehensive Metabolic Panel    Collection Time: 01/24/24 12:07 AM   Result Value Ref Range    Sodium Level 141  136 - 145 mmol/L    Potassium Level 3.5 3.5 - 5.1 mmol/L    Chloride 110 (H) 98 - 107 mmol/L    Carbon Dioxide 21 (L) 22 - 29 mmol/L    Glucose Level 108 (H) 74 - 100 mg/dL    Blood Urea Nitrogen 14.1 8.9 - 20.6 mg/dL    Creatinine 0.82 0.73 - 1.18 mg/dL    Calcium Level Total 8.7 8.4 - 10.2 mg/dL    Protein Total 6.5 6.4 - 8.3 gm/dL    Albumin Level 3.2 (L) 3.5 - 5.0 g/dL    Globulin 3.3 2.4 - 3.5 gm/dL    Albumin/Globulin Ratio 1.0 (L) 1.1 - 2.0 ratio    Bilirubin Total 0.5 <=1.5 mg/dL    Alkaline Phosphatase 65 40 - 150 unit/L    Alanine Aminotransferase 7 0 - 55 unit/L    Aspartate Aminotransferase 17 5 - 34 unit/L    eGFR >60 mls/min/1.73/m2   CBC with Differential    Collection Time: 01/24/24 12:07 AM   Result Value Ref Range    WBC 8.60 4.50 - 11.50 x10(3)/mcL    RBC 5.05 4.70 - 6.10 x10(6)/mcL    Hgb 14.6 14.0 - 18.0 g/dL    Hct 40.9 (L) 42.0 - 52.0 %    MCV 81.0 80.0 - 94.0 fL    MCH 28.9 27.0 - 31.0 pg    MCHC 35.7 33.0 - 36.0 g/dL    RDW 12.9 11.5 - 17.0 %    Platelet 142 130 - 400 x10(3)/mcL    MPV 10.6 (H) 7.4 - 10.4 fL    Neut % 69.8 %    Lymph % 24.5 %    Mono % 5.3 %    Eos % 0.1 %    Basophil % 0.2 %    Lymph # 2.11 0.6 - 4.6 x10(3)/mcL    Neut # 5.99 2.1 - 9.2 x10(3)/mcL    Mono # 0.46 0.1 - 1.3 x10(3)/mcL    Eos # 0.01 0 - 0.9 x10(3)/mcL    Baso # 0.02 <=0.2 x10(3)/mcL    IG# 0.01 0 - 0.04 x10(3)/mcL    IG% 0.1 %    NRBC% 0.0 %     Significant Imaging:  Imaging Results              X-Ray Chest 1 View (Final result)  Result time 01/22/24 08:24:03      Final result by Lewis Malhotra MD (01/22/24 08:24:03)                   Impression:      No significant abnormalities      Electronically signed by: Lewis Malhotra  Date:    01/22/2024  Time:    08:24               Narrative:    EXAMINATION:  XR CHEST 1 VIEW    CPT 50002    CLINICAL HISTORY:  lateral to look at icd;    FINDINGS:  Only lateral projection is provided lung fields appear to be clear and free of gross infiltrates atelectasis or  effusions.  ICD device is identified                                    EKG:      Telemetry: Paced    Physical Exam  Vitals and nursing note reviewed.   Constitutional:       General: He is not in acute distress.     Appearance: Normal appearance. He is ill-appearing.   HENT:      Head: Normocephalic.      Mouth/Throat:      Mouth: Mucous membranes are moist.   Eyes:      Extraocular Movements: Extraocular movements intact.      Conjunctiva/sclera: Conjunctivae normal.   Cardiovascular:      Rate and Rhythm: Normal rate and regular rhythm.      Heart sounds: Normal heart sounds.      Comments: Paced  Pulmonary:      Effort: Pulmonary effort is normal. No respiratory distress.      Breath sounds: Normal breath sounds.   Abdominal:      Palpations: Abdomen is soft.   Musculoskeletal:         General: Normal range of motion.      Cervical back: Neck supple.      Right lower leg: No edema.      Left lower leg: No edema.   Skin:     General: Skin is warm and dry.      Comments: R Lateral Neck Active Fixation/Generator   Neurological:      General: No focal deficit present.      Mental Status: He is alert and oriented to person, place, and time. Mental status is at baseline.   Psychiatric:         Mood and Affect: Mood normal.         Behavior: Behavior normal.     Home Medications:   No current facility-administered medications on file prior to encounter.     Current Outpatient Medications on File Prior to Encounter   Medication Sig Dispense Refill    albuterol (PROVENTIL/VENTOLIN HFA) 90 mcg/actuation inhaler Inhale 1 puff into the lungs as needed.  5    BIKTARVY -25 mg per tablet Take 1 tablet by mouth once daily.  0    buprenorphine-naloxone (SUBOXONE) 8-2 mg Film Place 1 each under the tongue once daily.       busPIRone (BUSPAR) 30 MG Tab Take 1 tablet by mouth 2 (two) times daily.      lidocaine (LIDODERM) 5 % Place 1 patch onto the skin once daily.  0    LORazepam (ATIVAN) 0.5 MG tablet Take 1 tablet (0.5 mg  total) by mouth 3 (three) times daily as needed (withdrawal symptoms). 12 tablet 0    thiamine 100 MG tablet Take 1 tablet (100 mg total) by mouth once daily.       Current Inpatient Medications:    Current Facility-Administered Medications:     buprenorphine HCL SL tablet 8 mg, 8 mg, Sublingual, Daily, Sushila Del Rio MD    busPIRone tablet 30 mg, 30 mg, Oral, BID, Sushila Del Rio MD, 30 mg at 01/23/24 1624    dexmedetomidine (PRECEDEX) 400mcg/100mL 0.9% NaCL infusion, 0-1.4 mcg/kg/hr, Intravenous, Continuous, Cami Gallardo MD, Last Rate: 10.5 mL/hr at 01/24/24 0729, 0.6 mcg/kg/hr at 01/24/24 0729    enoxaparin injection 40 mg, 40 mg, Subcutaneous, Daily, Raz Musa DO    famotidine tablet 20 mg, 20 mg, Oral, BID, Raz Musa DO, 20 mg at 01/23/24 0827    lactated ringers bolus 1,000 mL, 1,000 mL, Intravenous, Once, Pam Menjivar MD    LIDOcaine 2000 mg in D5W 250 mL infusion, 0.5 mg/min, Intravenous, Continuous, Aayd Kwong MD, Last Rate: 3.8 mL/hr at 01/24/24 0729, 0.5 mg/min at 01/24/24 0729    LIDOcaine 4 % cream, , Topical (Top), Once, Cami Gallardo MD    magnesium sulfate 2g in water 50mL IVPB (premix), 2 g, Intravenous, PRN, Raz Musa DO    mupirocin 2 % ointment, , Nasal, BID, Cami Gallardo MD    potassium chloride 10 mEq in 100 mL IVPB, 20 mEq, Intravenous, Daily PRN, Raz Musa DO    scopolamine 1.3-1.5 mg (1 mg over 3 days) 1 patch, 1 patch, Transdermal, Q3 Days, Raz Musa DO, 1 patch at 01/22/24 0115    sodium chloride 0.9% flush 10 mL, 10 mL, Intravenous, PRN, Raz Musa DO    sulfamethoxazole-trimethoprim 800-160mg per tablet 1 tablet, 1 tablet, Oral, BID, Virginia Jennings FNP, 1 tablet at 01/23/24 2119    [START ON 1/31/2024] sulfamethoxazole-trimethoprim 800-160mg per tablet 1 tablet, 1 tablet, Oral, Daily, Virginia Jennings FNP    thiamine tablet 100 mg, 100 mg, Oral, Daily, Raz Musa, DO, 100 mg at 01/23/24 0827    traZODone tablet 100 mg, 100 mg, Oral,  QHS, Pierre Vega MD, 100 mg at 01/23/24 2119    VTE Risk Mitigation (From admission, onward)           Ordered     enoxaparin injection 40 mg  Daily         01/21/24 2211     IP VTE HIGH RISK PATIENT  Once         01/21/24 2211                  Assessment:   VT Storm Requiring Defibrillation- Multiple Shocks since Admission s/p TVP Overdrive Pacing    - s/p Active Fixation (1.22.24)    - Troponin Values Normal   Orthostatic Hypotension   Prolonged QTc  History of NICMO/EF 40-45% - Now Recovered with EF 55-60%    - Status Post Subcutaneous ICD    - Normal Coronaries in 2019  History of MI  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 2021)  Polysubstance Abuse - With Delirium Tremors     - Opioid Disorder (Heroin) & IV Meth Use    - Nicotine Dependence  History of Hepatitis C  HIV    - On Biktarvy  History of Right PCA Mycotic Aneurysm  History of Right Parieto-Occipital ICH  DNR Status     Plan:   Discontinue Lidocaine Infusion   EKG with TVP Off to Evaluate QTc  Agree with IVF Bolus for BP Support  Active Fixation Precautions   Keep K > 4.0 and Mg > 2.0  Labs and EKG in AM: CBC, CMP and Mg    Shawn Montelongo, ANP  Cardiology  [facility]  01/24/2024     Physician addendum:  I have seen and examined this patient as a split-shared visit with the BAM d/t complicated medical management of above problems written in assessment and high acuity requiring physician expertise in medical decision-making. I performed the substantive portion of the history and exam. Above medical decision-making is also formulated by me.    Cardiovascular exam:  S1, S2  Lungs:  fine crackles at bases.  Extremities:  1+ edema bilaterally    Plan:  Discontinue lidocaine.  Patient wants to be on hospice.  We will DC the temporary wire.  Refrain from rate-controlling medications.  Discussed with the patient regarding abstinence from drinking and drug use.  He agreed to that.  We will sign  off once device is removed.      Ayad Kwogn MD  Cardiologist

## 2024-01-24 NOTE — PLAN OF CARE
Called [facility] for psych consult for patient via request from nurse, Jami. Consult put in by MD.

## 2024-01-24 NOTE — PLAN OF CARE
01/24/24 1111   Discharge Reassessment   Assessment Type Discharge Planning Reassessment   Did the patient's condition or plan change since previous assessment? Yes   Discharge Plan discussed with: Patient   Communicated TOMMIE with patient/caregiver Date not available/Unable to determine   Discharge Plan A Hospice/home   Discharge Plan B Hospice/home   DME Needed Upon Discharge    (to be determined)   Transition of Care Barriers None   Post-Acute Status   Post-Acute Authorization Hospice   Hospice Status Referrals Sent   Patient choice form signed by patient/caregiver List with quality metrics by geographic area provided   Discharge Delays None known at this time     Patient requesting hospice care. Spoke to patient about choices. He chose [facility]. Called [facility] and communicated patient's situation of being incarcerated and currently has not been sentenced yet. Gave her the facility's medical director's information Miguel Lorenzo [phone]. She is not familiar with hospice and patient's status as inmate so she will contact me after she gets more information.

## 2024-01-24 NOTE — PLAN OF CARE
Spoke to Jacquie with [facility]. Hospice nurse will assess patient today on site. Spoke with Miguel Lorenzo, medical director, at [facility] regarding patient's election of hospice care. He will need updated clinicals/progress notes. Has meeting set up tomorrow morning with Ivan CLEMENS and  to present case at 10:00 am. Consent to release medical records to [facility] signed by patient and faxed information to Miguel at [phone].

## 2024-01-24 NOTE — NURSING
Nurses Note -- 4 Eyes      1/24/2024   3:55 PM      Skin assessed during: Daily Assessment      [x] No Altered Skin Integrity Present    [x]Prevention Measures Documented      [] Yes- Altered Skin Integrity Present or Discovered   [] LDA Added if Not in Epic (Describe Wound)   [] New Altered Skin Integrity was Present on Admit and Documented in LDA   [] Wound Image Taken    Wound Care Consulted? No    Attending Nurse:  Love Orellana RN/Staff Member:   SCOTTY Anderson

## 2024-01-24 NOTE — PROGRESS NOTES
Pharmacokinetic Assessment Follow Up: IV Vancomycin      Vancomycin Regimen Plan:    Vancomycin 1250mg q12h.  Check trough at 1000 on 1/25/24.    Drug levels (last 3 results):  Recent Labs   Lab Result Units 01/23/24  0951 01/23/24  1824   Vanc Lvl Random ug/ml  --  15.9   Vancomycin Trough ug/ml 35.3*  --             Patient brief summary:  Britton Perez is a 33 y.o. male initiated on antimicrobial therapy with IV Vancomycin for treatment of skin & soft tissue infection      Drug Allergies:   Review of patient's allergies indicates:   Allergen Reactions    Penicillins        Actual Body Weight:   70.3 kg    Renal Function:   Estimated Creatinine Clearance: 125 mL/min (based on SCr of 0.82 mg/dL).,     Dialysis Method (if applicable):  N/A    CBC (last 72 hours):  Recent Labs   Lab Result Units 01/21/24 1923 01/22/24  0119 01/23/24  0009   WBC x10(3)/mcL 8.96 8.60 11.35   Hgb g/dL 14.9 13.4* 15.2   Hct % 42.3 38.5* 44.3   Platelet x10(3)/mcL 202 164 165   Mono % % 2.1 2.3 6.8   Eos % % 0.0 0.0 0.0   Basophil % % 0.1 0.1 0.2       Metabolic Panel (last 72 hours):  Recent Labs   Lab Result Units 01/21/24 1923 01/22/24  0119 01/22/24  0905 01/22/24  1834 01/23/24  0009   Sodium Level mmol/L 138 139  --   --  142   Potassium Level mmol/L 4.1 3.8 3.9 4.1 4.1   Chloride mmol/L 103 107  --   --  113*   Carbon Dioxide mmol/L 21* 20*  --   --  23   Glucose Level mg/dL 105* 110*  --   --  147*   Blood Urea Nitrogen mg/dL 15.5 14.5  --   --  13.5   Creatinine mg/dL 0.89 0.71*  --   --  0.82   Albumin Level g/dL 4.3 3.6  --   --  3.4*   Bilirubin Total mg/dL 0.6 0.4  --   --  0.6   Alkaline Phosphatase unit/L 91 79  --   --  72   Aspartate Aminotransferase unit/L 20 13  --   --  12   Alanine Aminotransferase unit/L 8 7  --   --  6   Magnesium Level mg/dL 2.20 2.30 2.10 2.20 2.20   Phosphorus Level mg/dL  --  2.4  --   --  2.9       Vancomycin Administrations:  vancomycin given in the last 96 hours                      vancomycin 1.25 g in dextrose 5% 250 mL IVPB (ready to mix) (mg) 1,250 mg New Bag 01/23/24 0305     1,250 mg New Bag 01/22/24 1958    vancomycin 1.75 g in 5 % dextrose 500 mL IVPB (mg) 1,750 mg New Bag 01/22/24 1208                    Microbiologic Results:  Microbiology Results (last 7 days)       Procedure Component Value Units Date/Time    Blood Culture [[id number]]  (Normal) Collected: 01/22/24 1834    Order Status: Completed Specimen: Blood from Arm, Right Updated: 01/23/24 2100     CULTURE, BLOOD ([facility]) No Growth At 24 Hours    Blood Culture [[id number]]  (Normal) Collected: 01/22/24 0914    Order Status: Completed Specimen: Arterial Blood Line Updated: 01/23/24 1101     CULTURE, BLOOD ([facility]) No Growth At 24 Hours

## 2024-01-24 NOTE — PROGRESS NOTES
[facility]  Infectious Disease Progress Note            ASSESSMENT & PLAN:     He is a 33-year-old male with a past medical history of AIDS, noncompliant with HAART, several episodes of endocarditis involving AICD, s/p AICD extraction and placement of SICD in 9/2021, mycotic cerebral aneurysm with associated ICH, IVDU with heroin, and hepatitis C, s/p treatment in 2019, who was brought to the emergency room on 01/21 from the long term after being found to have a V-fib arrest and was shocked by SICD.  Evaluated by cardiology, TVP placed for now andd patient on antaarythmics.  Regarding HIV, patient reports he was last seen by his provider Dr. Aston Montelongo approximately 1 month ago with labs at that time.  States he has been compliant with Biktarvy for at least the past 3 years, however review of prior records noted patient with longstanding history of noncompliance.  Current CD4 count 62/12%.  Also noted to have left thumb cellulitis at injection site, no concern for deep infection.  Received levofloxacin and vancomycin on admit, now on vancomycin alone.  Blood cultures negative thus far.  Patient currently without significant complaints.  Has been afebrile and hemodynamcially stable.  We have been consulted for assistance with HIV.      HIV  L thumb cellulitis  V-fib arrest   VT storm  Prolonged QTc  SICD status w/concern for malfunction, s/p TVP placement  H/o endocarditis / mycotic cerebral anyeurysm and ICH  Active IVDU with heroin  H/o HCV, s/p treatment in 2019     PLAN:  Case discussed with patient's physician Dr. Aston Montelongo.  Patient does not have Biktarvy.  Start Descovy and Tivicay.  Can resume Biktarvy once available.   Continue Bactrim BID through 1/26.  No need for prophy, current low CD4 likely s/t acute illness.  F/u with Dr. Aston Montelongo at [facility] for continued HIV care or through [facility].  Will sign off.  Please call if need arises.   Discussed with patient and nursing.     SUBJECTIVE:     AF, VSS still paced.  No  "issues overnight.     MEDICATIONS:   Reviewed in EMR    REVIEW OF SYSTEMS:   Except as documented, all other systems reviewed and negative     PHYSICAL EXAM:   T 98.6 °F (37 °C)   BP (!) 98/57   P 90   RR 11   O2 96 %  ENERAL: NAD; does not appear toxic  SKIN: no rash  HEENT: sclera non-icteric; PERRL   NECK: supple; no LAD  CHEST: CTA; nonlabored, equal expansion; no adventitious BS  CARDIOVASCULAR: Paced, S1S2; no murmur; strong, equal peripheral pulses; no edema  ABDOMEN:  active bowel sounds; abdomen soft, nondistended, nontender to palpation  EXTREMITIES: no cyanosis or clubbing; left thumb edematous and mildly erythematous, not warm or tender, no pain w/ROM -- improved today  NEURO: AAO x4; CN II-XII grossly intact  PSYCH: Mentation and affect appropriate     LABS AND IMAGING:     Recent Labs     01/23/24 0009 01/24/24 0007   WBC 11.35 8.60   RBC 5.28 5.05   HGB 15.2 14.6   HCT 44.3 40.9*   MCV 83.9 81.0   MCH 28.8 28.9   MCHC 34.3 35.7   RDW 13.0 12.9    142     Recent Labs     01/21/24 1923   LACTIC 2.1     Recent Labs     01/23/24 2023   INR 1.4*     No results for input(s): "HGBA1C", "CHOL", "TRIG", "LDL", "VLDL", "HDL" in the last 72 hours.   Recent Labs     01/21/24  1923 01/22/24  0119 01/23/24 0009 01/24/24 0007      < > 142 141   K 4.1   < > 4.1 3.5   CHLORIDE 103   < > 113* 110*   CO2 21*   < > 23 21*   BUN 15.5   < > 13.5 14.1   CREATININE 0.89   < > 0.82 0.82   GLUCOSE 105*   < > 147* 108*   CALCIUM 9.9   < > 9.3 8.7   MG 2.20   < > 2.20 2.00   PHOS  --    < > 2.9 3.7   ALBUMIN 4.3   < > 3.4* 3.2*   GLOBULIN 4.8*   < > 3.9* 3.3   ALKPHOS 91   < > 72 65   ALT 8   < > 6 7   AST 20   < > 12 17   BILITOT 0.6   < > 0.6 0.5   TSH 0.288*  --   --   --     < > = values in this interval not displayed.     Recent Labs     01/21/24  1923 01/22/24  0119 01/23/24  1824 01/24/24  0007 01/24/24  0913   *  --   --   --   --    TROPONINI <0.010   < > 0.142* 0.148* 0.185*    < > = values in " this interval not displayed.          Electrophysiology Procedure    The patient tolerated procedure well.    The patient left the lab in stable condition.    There were no immediate complications.    Successful temporary pacing catheter placement.    I certify that I was present for the critical steps of the procedure   including the diagnostic, surgical and/or interventional portions.     Procedure Log documented by Documenter: Darryn Almonte RN and verified by   Thad Interiano MD.    Date: 1/22/2024  Time: 11:20 AM          NESSA Bocanegra  Infectious Disease

## 2024-01-24 NOTE — PROGRESS NOTES
I returned a call to Foster  (patient's nurse) regarding troponin 0.142, resident wants to start heparin and asking for [facility] opinion. EKG V paced. Patient c/o CP. Reportedly, drug seeking behavior is suspected. Previous troponins were negative x 3 prior to TVP. The troponin elevation may be secondary to TVP.  Patient has history of normal coronaries in 2019. He also has a history of parieto occipital ICH. Consider consulting with neurology and/or neurosurgery prior to initiation of anticoagulation.

## 2024-01-24 NOTE — PROGRESS NOTES
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 Patient recently incarcerated 2 days ago. Today he was found to be having seizure like activity in his cell and was brought to [facility] ED where he reportedly had 4 episodes of Vtach/vfib and was immediately cardioverted by his ICD. With subsequent shocks patient would apparently be awake  and alert but notably bradycardic. He was then transferred to [facility] due to the lack of cardiology services. Upon arrival to our ED patient again went into vtach and was shocked with return of normal circulation. Cardiology was consulted in the ED and they recommended he be started on Amiodarone. Additionally the ICD rep has been called out for interrogation of the device. Currently the patient denies any chest pain, shortness of breath, or nausea. However, he did experience these symptoms during the arthymmic events.     Hospital Course/Significant events:      24 Hour Interval History:  Patient had an episode of hypotension with BP in the 70/60s overnight. BP did improved and currently 126/77. Patient did complain of chest pain overnight as well and troponin was ordered which came back 0.142. Will continue to trend until downtrend. Suspect due to demand due to episode of hypotension  "but will continue to monitor. Patient requested to speak to hospice at this time as he feels "its getting close to the end".    Past Medical History:   Diagnosis Date    Cardiac arrest 08/2019    pulseless, AED required    Depression     Heart failure, type unknown     fr other facility medical record    Hepatitis C     HIV (human immunodeficiency virus infection)     HTN (hypertension)     IV drug user     Myocardial infarct, old     fr transfer medical records    Neuropathy     left foot    Overdose     heroine, meth    Pneumonia     Polysubstance abuse     heroin methadone    PTSD (post-traumatic stress disorder)     from prev medical record    Seizure 08/2019    POSSIBLE Seizure prior to cardiac arrest, unclear       Past Surgical History:   Procedure Laterality Date    CHEST TUBE INSERTION Bilateral     bilat when intubated in the past (removed)    CORONARY ANGIOGRAPHY N/A 8/30/2019    Procedure: ANGIOGRAM, CORONARY ARTERY;  Surgeon: Misbah Culp MD;  Location: [facility] CATH;  Service: Cardiology;  Laterality: N/A;    GASTROSTOMY TUBE PLACEMENT      fr other facility medical record    INSERTION OF PACEMAKER  2007    INSERTION, PACEMAKER, TEMPORARY TRANSVENOUS Right 1/22/2024    Procedure: Insertion, Pacemaker, Temporary Transvenous;  Surgeon: Thad Interiano MD;  Location: [facility] CATH LAB;  Service: Cardiology;  Laterality: Right;    LEFT HEART CATHETERIZATION Left 8/30/2019    Procedure: Left heart cath;  Surgeon: Misbah Culp MD;  Location: [facility] CATH;  Service: Cardiology;  Laterality: Left;    REMOVAL OF PACEMAKER  2014    TRACHEOSTOMY      x2 in the past (removed/healed as of August 2019)       Social History     Socioeconomic History    Marital status: Single   Tobacco Use    Smoking status: Every Day   Substance and Sexual Activity    Drug use: Yes     Types: IV, Methamphetamines     Comment: heroine opiates           Current Outpatient Medications   Medication Instructions    albuterol (PROVENTIL/VENTOLIN " HFA) 90 mcg/actuation inhaler 1 puff, Inhalation, As needed (PRN)    BIKTARVY -25 mg per tablet 1 tablet, Oral, Daily    buprenorphine-naloxone (SUBOXONE) 8-2 mg Film 1 each, Sublingual, Daily    busPIRone (BUSPAR) 30 MG Tab 1 tablet, Oral, 2 times daily    lidocaine (LIDODERM) 5 % 1 patch, Transdermal, Daily    LORazepam (ATIVAN) 0.5 mg, Oral, 3 times daily PRN    thiamine 100 mg, Oral, Daily       Current Inpatient Medications   buprenorphine HCL  8 mg Sublingual Daily    busPIRone  30 mg Oral BID    enoxaparin  40 mg Subcutaneous Daily    famotidine  20 mg Oral BID    lactated ringers  1,000 mL Intravenous Once    LIDOcaine   Topical (Top) Once    mupirocin   Nasal BID    scopolamine  1 patch Transdermal Q3 Days    sulfamethoxazole-trimethoprim 800-160mg  1 tablet Oral BID    [START ON 1/31/2024] sulfamethoxazole-trimethoprim 800-160mg  1 tablet Oral Daily    thiamine  100 mg Oral Daily    traZODone  100 mg Oral QHS       Current Intravenous Infusions   dexmedeTOMIDine (Precedex) infusion (titrating) 0.6 mcg/kg/hr (01/24/24 0729)    LIDOcaine 0.5 mg/min (01/24/24 0729)         Review of Systems   Constitutional:  Negative for chills and fever.   Eyes:  Negative for double vision.   Respiratory:  Negative for cough and shortness of breath.    Cardiovascular:  Negative for chest pain, palpitations and orthopnea.   Gastrointestinal:  Negative for abdominal pain, diarrhea, nausea and vomiting.   Musculoskeletal:  Negative for back pain, myalgias and neck pain.   Neurological:  Negative for dizziness and headaches.          Objective:       Intake/Output Summary (Last 24 hours) at 1/24/2024 0900  Last data filed at 1/24/2024 0729  Gross per 24 hour   Intake 1078.53 ml   Output 1900 ml   Net -821.47 ml           Vital Signs (Most Recent):  Temp: 98.6 °F (37 °C) (01/24/24 0000)  Pulse: 89 (01/24/24 0700)  Resp: 12 (01/24/24 0700)  BP: 126/77 (01/24/24 0200)  SpO2: 96 % (01/24/24 0700)  Body mass index is 19.89  kg/m².  Weight: 70.3 kg (155 lb) Vital Signs (24h Range):  Temp:  [97.8 °F (36.6 °C)-98.6 °F (37 °C)] 98.6 °F (37 °C)  Pulse:  [89-93] 89  Resp:  [5-42] 12  SpO2:  [93 %-98 %] 96 %  BP: ()/(40-92) 126/77  Arterial Line BP: ()/(18-80) 97/63     Physical Exam  Vitals and nursing note reviewed.   Constitutional:       General: He is not in acute distress.     Appearance: He is normal weight. He is not ill-appearing or toxic-appearing.   HENT:      Head: Normocephalic and atraumatic.      Mouth/Throat:      Mouth: Mucous membranes are moist.      Pharynx: Oropharynx is clear.   Eyes:      Extraocular Movements: Extraocular movements intact.      Conjunctiva/sclera: Conjunctivae normal.      Pupils: Pupils are equal, round, and reactive to light.   Cardiovascular:      Rate and Rhythm: Regular rhythm. Bradycardia present.      Heart sounds: No murmur heard.  Pulmonary:      Effort: Pulmonary effort is normal. No respiratory distress.      Breath sounds: No wheezing or rhonchi.   Musculoskeletal:         General: No swelling.      Right lower leg: No edema.      Left lower leg: No edema.   Skin:     General: Skin is warm and dry.   Neurological:      General: No focal deficit present.      Mental Status: He is alert and oriented to person, place, and time.           Lines/Drains/Airways       Arterial Line  Duration             Arterial Line 01/22/24 0101 Left Radial 2 days              Peripheral Intravenous Line  Duration                  Peripheral IV - Single Lumen 01/22/24 0345 20 G Anterior;Left Forearm 2 days         Peripheral IV - Single Lumen 01/22/24 1459 Anterior;Distal;Right Upper Arm 1 day         Sheath 01/22/24 1108 Right 1 day                    Significant Labs:    Lab Results   Component Value Date    WBC 8.60 01/24/2024    HGB 14.6 01/24/2024    HCT 40.9 (L) 01/24/2024    MCV 81.0 01/24/2024     01/24/2024           BMP  Lab Results   Component Value Date     01/24/2024    K  "3.5 01/24/2024    CHLORIDE 110 (H) 01/24/2024    CO2 21 (L) 01/24/2024    BUN 14.1 01/24/2024    CREATININE 0.82 01/24/2024    CALCIUM 8.7 01/24/2024    ESTGFRAFRICA >105 10/01/2021    EGFRNONAA >105 09/10/2021         ABG  No results for input(s): "PH", "PO2", "PCO2", "HCO3", "POCBASEDEF" in the last 168 hours.    Mechanical Ventilation Support:         Significant Imaging:  I have reviewed the pertinent imaging within the past 24 hours.        Assessment/Plan:     Assessment  VT storm  Prolonged QTc  Hx of MI 2007 with HFrEF s/p AICD  Hx of recurrent endocarditis with multiple AICD replacements (most recent 9/21)  Opioid use disorder (Heroin)  Thumb infection  Hx of Hep C, HIV/AIDS, R PCA mycotic aneurysm, R parieto-occipital ICH      Plan  1.Cardiology following.  We will need to regroup for definitive plan  2. CD4 count 62 and 6%.  Viral load currently pending.  3. Hold HIV medications until further records obtained to determine viral status  3. Started on Bactrim for thumb cellulitis and OI prophylaxis  4. Continue Suboxone for withdrawals  5. Restarted Psych meds. Hold off on additional Ativan and Morphine at this time.        Patient electing to be DNR status.   I have spoken with the patient at length about the patient's CODE STATUS.  I have fully informed him of the risks and benefits of CPR and mechanical ventilation.  We have also discussed the differences between FULL CODE, DNR/DNI and DNR. He, without coercion or bias, made a decision to make     Britton Perez    CODE STATUS: DNR; status confirmed/order placed in chart     DVT Prophylaxis:Lovenox  GI Prophylaxis:famotidine     32 minutes of critical care was time spent personally by me on the following activities: development of treatment plan with patient or surrogate and bedside caregivers, discussions with consultants, evaluation of patient's response to treatment, examination of patient, ordering and performing treatments and interventions, ordering " and review of laboratory studies, ordering and review of radiographic studies, pulse oximetry, re-evaluation of patient's condition.  This critical care time did not overlap with that of any other provider or involve time for any procedures.     Pierre Vega MD  Pulmonary Critical Care Medicine  [facility] - Emergency Dept  DOS: 01/24/2024

## 2024-01-24 NOTE — CONSULTS
"1/24/2024  Britton Perez   1990   [id number]            Psychiatry Initial Consult Note    Date of Admission: 1/21/2024  8:53 PM    Chief Complaint: "Anxious"    SUBJECTIVE:   History of Present Illness:   Britton Perez is a 33 y.o. male with a history of IV drug use, cardiac arrest in 2007, EF less than 20%, endocarditis x3, Hep C, and HIV who was recently incarcerated 2 days ago and was found to be having seizure like activity and brought to [facility] ED where he had multiple episodes of Vtach/vfib. Currently has ICD. Psychiatry consulted for depression. He was seen at the bedside where he denies depression and does not believe he is depressed. Reports history of anxiety, but when asked if he worries about things, he states no and that he is just anxious in general. Reports history of treatment for alcohol use disorder. Currently using heroin with last use approximately a week ago. Reports poor sleep and appetite for past week along with anxiety and states he believes this is due to opiate withdraws. Denies SI, HI, or hallucinations. No evidence of psychosis. Does not wish to take SSRIs or SNRIs for anxiety.        Past Psychiatric History:   Previous Psychiatric Hospitalizations: Reports past history for ETOH detox  Previous Medication Trials: Alprazolam, ambien  Previous Suicide Attempts: Denies   Outpatient psychiatrist: Managed by PCP    Past Medical/Surgical History:   Past Medical History:   Diagnosis Date    Cardiac arrest 08/2019    pulseless, AED required    Depression     Heart failure, type unknown     fr other facility medical record    Hepatitis C     HIV (human immunodeficiency virus infection)     HTN (hypertension)     IV drug user     Myocardial infarct, old     fr transfer medical records    Neuropathy     left foot    Overdose     heroine, meth    Pneumonia     Polysubstance abuse     heroin methadone    PTSD (post-traumatic stress disorder)     from prev medical record    Seizure 08/2019    POSSIBLE " Seizure prior to cardiac arrest, unclear     Past Surgical History:   Procedure Laterality Date    CHEST TUBE INSERTION Bilateral     bilat when intubated in the past (removed)    CORONARY ANGIOGRAPHY N/A 8/30/2019    Procedure: ANGIOGRAM, CORONARY ARTERY;  Surgeon: Misbah Culp MD;  Location: [facility];  Service: Cardiology;  Laterality: N/A;    GASTROSTOMY TUBE PLACEMENT      fr other facility medical record    INSERTION OF PACEMAKER  2007    INSERTION, PACEMAKER, TEMPORARY TRANSVENOUS Right 1/22/2024    Procedure: Insertion, Pacemaker, Temporary Transvenous;  Surgeon: Thad Interiano MD;  Location: [facility] CATH LAB;  Service: Cardiology;  Laterality: Right;    LEFT HEART CATHETERIZATION Left 8/30/2019    Procedure: Left heart cath;  Surgeon: Misbah Culp MD;  Location: [facility];  Service: Cardiology;  Laterality: Left;    REMOVAL OF PACEMAKER  2014    TRACHEOSTOMY      x2 in the past (removed/healed as of August 2019)         Family Psychiatric History:   Denies     Allergies:   Review of patient's allergies indicates:   Allergen Reactions    Penicillins        Substance Abuse History:   Tobacco: Daily  Alcohol: Reports 1-2 drinks a couple times a month, history of heavier use requiring detox at times  Illicit Substances: Heroin  Treatment: Reports ETOH inpatient rehab; history of methadone and suboxone management for opiates      Current Medications:   Home Psychiatric Meds: Alprazolam 2mg PO TID; buspirone 30mg BID; Trazodone 100mg PO PRN QHS insomnia; zolpidem 10mg PO QHS PRN insomnia    Scheduled Meds:    ALPRAZolam  0.25 mg Oral TID    buprenorphine HCL  8 mg Sublingual Daily    busPIRone  30 mg Oral BID    dolutegravir  50 mg Oral Daily    emtricitabine-tenofovir alafen  1 tablet Oral Daily    enoxaparin  40 mg Subcutaneous Daily    famotidine  20 mg Oral BID    LIDOcaine   Topical (Top) Once    mupirocin   Nasal BID    scopolamine  1 patch Transdermal Q3 Days    sulfamethoxazole-trimethoprim 800-160mg  1  tablet Oral BID    thiamine  100 mg Oral Daily    traZODone  100 mg Oral QHS      PRN Meds: magnesium sulfate IVPB, potassium chloride, sodium chloride 0.9%   Psychotherapeutics (From admission, onward)      Start     Stop Route Frequency Ordered    01/24/24 1600  ALPRAZolam tablet 0.25 mg         -- Oral 3 times daily 01/24/24 1555    01/23/24 2100  traZODone tablet 100 mg         -- Oral Nightly 01/23/24 1742    01/23/24 1500  busPIRone tablet 30 mg         -- Oral 2 times daily 01/23/24 1411              Social History:  Housing Status: Lives with significant other  Relationship Status/Sexual Orientation: Single   Children: 0  Employment Status/Info: [employer] history: Denies  History of physical/sexual abuse: Denies   Access to gun: Denies       Legal History:   Past Charges/Incarcerations: Yes, multiple for substance related charges         OBJECTIVE:       Vitals   Vitals:    01/24/24 1500   BP:    Pulse: 90   Resp: (!) 22   Temp:         Labs/Imaging/Studies:   Recent Results (from the past 48 hour(s))   Blood Culture    Collection Time: 01/22/24  6:34 PM    Specimen: Arm, Right; Blood   Result Value Ref Range    CULTURE, BLOOD ([facility]) No Growth At 24 Hours    Magnesium    Collection Time: 01/22/24  6:34 PM   Result Value Ref Range    Magnesium Level 2.20 1.60 - 2.60 mg/dL   Potassium    Collection Time: 01/22/24  6:34 PM   Result Value Ref Range    Potassium Level 4.1 3.5 - 5.1 mmol/L   SYPHILIS ANTIBODY (WITH REFLEX RPR)    Collection Time: 01/22/24  6:34 PM   Result Value Ref Range    Syphilis Antibody Nonreactive Nonreactive, Equivocal   Magnesium    Collection Time: 01/23/24 12:09 AM   Result Value Ref Range    Magnesium Level 2.20 1.60 - 2.60 mg/dL   Comprehensive Metabolic Panel    Collection Time: 01/23/24 12:09 AM   Result Value Ref Range    Sodium Level 142 136 - 145 mmol/L    Potassium Level 4.1 3.5 - 5.1 mmol/L    Chloride 113 (H) 98 - 107 mmol/L    Carbon Dioxide 23 22 - 29 mmol/L     Glucose Level 147 (H) 74 - 100 mg/dL    Blood Urea Nitrogen 13.5 8.9 - 20.6 mg/dL    Creatinine 0.82 0.73 - 1.18 mg/dL    Calcium Level Total 9.3 8.4 - 10.2 mg/dL    Protein Total 7.3 6.4 - 8.3 gm/dL    Albumin Level 3.4 (L) 3.5 - 5.0 g/dL    Globulin 3.9 (H) 2.4 - 3.5 gm/dL    Albumin/Globulin Ratio 0.9 (L) 1.1 - 2.0 ratio    Bilirubin Total 0.6 <=1.5 mg/dL    Alkaline Phosphatase 72 40 - 150 unit/L    Alanine Aminotransferase 6 0 - 55 unit/L    Aspartate Aminotransferase 12 5 - 34 unit/L    eGFR >60 mls/min/1.73/m2   Phosphorus    Collection Time: 01/23/24 12:09 AM   Result Value Ref Range    Phosphorus Level 2.9 2.3 - 4.7 mg/dL   CBC with Differential    Collection Time: 01/23/24 12:09 AM   Result Value Ref Range    WBC 11.35 4.50 - 11.50 x10(3)/mcL    RBC 5.28 4.70 - 6.10 x10(6)/mcL    Hgb 15.2 14.0 - 18.0 g/dL    Hct 44.3 42.0 - 52.0 %    MCV 83.9 80.0 - 94.0 fL    MCH 28.8 27.0 - 31.0 pg    MCHC 34.3 33.0 - 36.0 g/dL    RDW 13.0 11.5 - 17.0 %    Platelet 165 130 - 400 x10(3)/mcL    MPV 9.6 7.4 - 10.4 fL    Neut % 79.3 %    Lymph % 13.4 %    Mono % 6.8 %    Eos % 0.0 %    Basophil % 0.2 %    Lymph # 1.52 0.6 - 4.6 x10(3)/mcL    Neut # 9.01 2.1 - 9.2 x10(3)/mcL    Mono # 0.77 0.1 - 1.3 x10(3)/mcL    Eos # 0.00 0 - 0.9 x10(3)/mcL    Baso # 0.02 <=0.2 x10(3)/mcL    IG# 0.03 0 - 0.04 x10(3)/mcL    IG% 0.3 %    NRBC% 0.0 %   VANCOMYCIN, TROUGH    Collection Time: 01/23/24  9:51 AM   Result Value Ref Range    Vancomycin Trough 35.3 (HH) 15.0 - 20.0 ug/ml   MRSA PCR    Collection Time: 01/23/24  1:23 PM   Result Value Ref Range    MRSA PCR SCRN ([facility]) Not Detected Not Detected   Vancomycin, Random    Collection Time: 01/23/24  6:24 PM   Result Value Ref Range    Vanc Lvl Random 15.9 15.0 - 20.0 ug/ml   Troponin I    Collection Time: 01/23/24  6:24 PM   Result Value Ref Range    Troponin-I 0.142 (H) 0.000 - 0.045 ng/mL   APTT    Collection Time: 01/23/24  8:23 PM   Result Value Ref Range    PTT 28.1 23.2 - 33.7 seconds    Protime-INR    Collection Time: 01/23/24  8:23 PM   Result Value Ref Range    PT 16.6 (H) 12.5 - 14.5 seconds    INR 1.4 (H) <=1.3   Troponin I    Collection Time: 01/24/24 12:07 AM   Result Value Ref Range    Troponin-I 0.148 (H) 0.000 - 0.045 ng/mL   Magnesium    Collection Time: 01/24/24 12:07 AM   Result Value Ref Range    Magnesium Level 2.00 1.60 - 2.60 mg/dL   Phosphorus    Collection Time: 01/24/24 12:07 AM   Result Value Ref Range    Phosphorus Level 3.7 2.3 - 4.7 mg/dL   Comprehensive Metabolic Panel    Collection Time: 01/24/24 12:07 AM   Result Value Ref Range    Sodium Level 141 136 - 145 mmol/L    Potassium Level 3.5 3.5 - 5.1 mmol/L    Chloride 110 (H) 98 - 107 mmol/L    Carbon Dioxide 21 (L) 22 - 29 mmol/L    Glucose Level 108 (H) 74 - 100 mg/dL    Blood Urea Nitrogen 14.1 8.9 - 20.6 mg/dL    Creatinine 0.82 0.73 - 1.18 mg/dL    Calcium Level Total 8.7 8.4 - 10.2 mg/dL    Protein Total 6.5 6.4 - 8.3 gm/dL    Albumin Level 3.2 (L) 3.5 - 5.0 g/dL    Globulin 3.3 2.4 - 3.5 gm/dL    Albumin/Globulin Ratio 1.0 (L) 1.1 - 2.0 ratio    Bilirubin Total 0.5 <=1.5 mg/dL    Alkaline Phosphatase 65 40 - 150 unit/L    Alanine Aminotransferase 7 0 - 55 unit/L    Aspartate Aminotransferase 17 5 - 34 unit/L    eGFR >60 mls/min/1.73/m2   CBC with Differential    Collection Time: 01/24/24 12:07 AM   Result Value Ref Range    WBC 8.60 4.50 - 11.50 x10(3)/mcL    RBC 5.05 4.70 - 6.10 x10(6)/mcL    Hgb 14.6 14.0 - 18.0 g/dL    Hct 40.9 (L) 42.0 - 52.0 %    MCV 81.0 80.0 - 94.0 fL    MCH 28.9 27.0 - 31.0 pg    MCHC 35.7 33.0 - 36.0 g/dL    RDW 12.9 11.5 - 17.0 %    Platelet 142 130 - 400 x10(3)/mcL    MPV 10.6 (H) 7.4 - 10.4 fL    Neut % 69.8 %    Lymph % 24.5 %    Mono % 5.3 %    Eos % 0.1 %    Basophil % 0.2 %    Lymph # 2.11 0.6 - 4.6 x10(3)/mcL    Neut # 5.99 2.1 - 9.2 x10(3)/mcL    Mono # 0.46 0.1 - 1.3 x10(3)/mcL    Eos # 0.01 0 - 0.9 x10(3)/mcL    Baso # 0.02 <=0.2 x10(3)/mcL    IG# 0.01 0 - 0.04 x10(3)/mcL    IG%  "0.1 %    NRBC% 0.0 %   Troponin I    Collection Time: 01/24/24  9:13 AM   Result Value Ref Range    Troponin-I 0.185 (H) 0.000 - 0.045 ng/mL      No results found for: "PHENYTOIN", "PHENOBARB", "VALPROATE", "CBMZ"        Psychiatric Mental Status Exam:  General Appearance: appears stated age, dressed in hospital garb, Sitting inbed  Arousal: alert with clear sensorium  Behavior: cooperative, appropriate eye-contact  Movements and Motor Activity: +psychomotor agitation  Orientation: intact; oriented fully to person, place, time and situation  Speech: normal rate, rhythm, volume, tone and pitch  Mood: Anxious  Affect: reactive, full-range, mood-congruent  Thought Process: linear, goal-directed, organized, logical  Associations: no loosening of associations  Thought Content and Perceptions: no suicidal or homicidal ideation, no auditory or visual hallucinations, no paranoid ideation, no ideas of reference, no evidence of delusions or psychosis  Recent and Remote Memory: grossly intact, able to recall relevant and salient information from the recent and remote past; per interview/observation with patient  Attention and Concentration: grossly intact; per interview/observation with patient  Fund of Knowledge: grossly intact; based on history, vocabulary, fund of knowledge, syntax, grammar, and content  Insight: adequate; based on understanding of severity of illness and HPI  Judgment: questionable; based on patient's behavior and HPI          ASSESSMENT/PLAN:   Diagnoses:  SUBSTANCE-RELATED DISORDERS; Opioid-Related Disorders; 4.9.1.2.Opioid Abuse (F11.10), Sedative-, Hypnotic-, or Anxiolytic-Related Disorders; Sedative, Hypnotic, 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), and Polysubstance Dependence; Polysubstance Dependence On Agonist Therapy         Past Medical History:   Diagnosis Date    Cardiac arrest 08/2019    pulseless, AED required    Depression     Heart failure, type unknown     fr other facility medical record    " Hepatitis C     HIV (human immunodeficiency virus infection)     HTN (hypertension)     IV drug user     Myocardial infarct, old     fr transfer medical records    Neuropathy     left foot    Overdose     heroine, meth    Pneumonia     Polysubstance abuse     heroin methadone    PTSD (post-traumatic stress disorder)     from prev medical record    Seizure 08/2019    POSSIBLE Seizure prior to cardiac arrest, unclear          Problem lists and Management Plans:  Recommend restarting alprazolam at reduced dose  Recommend SSRI/SNRI treatment for anxiety. Patient refuses at this time.  PEC not needed. Not currently an imminent threat to self or others, nor gravely disabled due to mental illness.  Will sign-off; please reconsult as needed.      Abe Hernandez

## 2024-01-24 NOTE — PLAN OF CARE
Received call from Jacquie at [facility]. Agency is denying admittance due to patient not giving indication that he will adhere to contract regarding drugs. She states she is unsure if patient was notified that he cannot be admitted without agreeing to requirements related to this. She will come by in the morning and explain this to the patient if it was not explained. Waiting on meeting tomorrow with DA,  and medical team at correctional facility also. Patient may qualify for inpatient or may have to send referral to second choice, hospice [facility].

## 2024-01-25 LAB
ALBUMIN SERPL-MCNC: 3.4 G/DL (ref 3.5–5)
ALBUMIN/GLOB SERPL: 1.2 RATIO (ref 1.1–2)
ALP SERPL-CCNC: 64 UNIT/L (ref 40–150)
ALT SERPL-CCNC: 8 UNIT/L (ref 0–55)
AST SERPL-CCNC: 19 UNIT/L (ref 5–34)
BASOPHILS # BLD AUTO: 0.01 X10(3)/MCL
BASOPHILS NFR BLD AUTO: 0.1 %
BILIRUB SERPL-MCNC: 0.5 MG/DL
BUN SERPL-MCNC: 7.4 MG/DL (ref 8.9–20.6)
CALCIUM SERPL-MCNC: 8.7 MG/DL (ref 8.4–10.2)
CHLORIDE SERPL-SCNC: 108 MMOL/L (ref 98–107)
CO2 SERPL-SCNC: 22 MMOL/L (ref 22–29)
CREAT SERPL-MCNC: 0.84 MG/DL (ref 0.73–1.18)
EOSINOPHIL # BLD AUTO: 0 X10(3)/MCL (ref 0–0.9)
EOSINOPHIL NFR BLD AUTO: 0 %
ERYTHROCYTE [DISTWIDTH] IN BLOOD BY AUTOMATED COUNT: 12.8 % (ref 11.5–17)
GFR SERPLBLD CREATININE-BSD FMLA CKD-EPI: >60 MLS/MIN/1.73/M2
GLOBULIN SER-MCNC: 2.8 GM/DL (ref 2.4–3.5)
GLUCOSE SERPL-MCNC: 92 MG/DL (ref 74–100)
HCT VFR BLD AUTO: 36.5 % (ref 42–52)
HGB BLD-MCNC: 12.7 G/DL (ref 14–18)
HIV1 RNA # PLAS NAA DL=20: 3000 COPIES/ML
IMM GRANULOCYTES # BLD AUTO: 0.03 X10(3)/MCL (ref 0–0.04)
IMM GRANULOCYTES NFR BLD AUTO: 0.3 %
LYMPHOCYTES # BLD AUTO: 1.67 X10(3)/MCL (ref 0.6–4.6)
LYMPHOCYTES NFR BLD AUTO: 18.9 %
MAGNESIUM SERPL-MCNC: 1.8 MG/DL (ref 1.6–2.6)
MCH RBC QN AUTO: 28.9 PG (ref 27–31)
MCHC RBC AUTO-ENTMCNC: 34.8 G/DL (ref 33–36)
MCV RBC AUTO: 83 FL (ref 80–94)
MONOCYTES # BLD AUTO: 0.58 X10(3)/MCL (ref 0.1–1.3)
MONOCYTES NFR BLD AUTO: 6.6 %
NEUTROPHILS # BLD AUTO: 6.55 X10(3)/MCL (ref 2.1–9.2)
NEUTROPHILS NFR BLD AUTO: 74.1 %
NRBC BLD AUTO-RTO: 0 %
PHOSPHATE SERPL-MCNC: 2.8 MG/DL (ref 2.3–4.7)
PLATELET # BLD AUTO: 127 X10(3)/MCL (ref 130–400)
PLATELETS.RETICULATED NFR BLD AUTO: 5 % (ref 0.9–11.2)
PMV BLD AUTO: 10.5 FL (ref 7.4–10.4)
POTASSIUM SERPL-SCNC: 2.9 MMOL/L (ref 3.5–5.1)
PROT SERPL-MCNC: 6.2 GM/DL (ref 6.4–8.3)
RBC # BLD AUTO: 4.4 X10(6)/MCL (ref 4.7–6.1)
SODIUM SERPL-SCNC: 141 MMOL/L (ref 136–145)
TROPONIN I SERPL-MCNC: 0.47 NG/ML (ref 0–0.04)
WBC # SPEC AUTO: 8.84 X10(3)/MCL (ref 4.5–11.5)

## 2024-01-25 PROCEDURE — 25000003 PHARM REV CODE 250: Performed by: NURSE PRACTITIONER

## 2024-01-25 PROCEDURE — 84484 ASSAY OF TROPONIN QUANT: CPT | Performed by: STUDENT IN AN ORGANIZED HEALTH CARE EDUCATION/TRAINING PROGRAM

## 2024-01-25 PROCEDURE — 63600175 PHARM REV CODE 636 W HCPCS

## 2024-01-25 PROCEDURE — 63600175 PHARM REV CODE 636 W HCPCS: Performed by: INTERNAL MEDICINE

## 2024-01-25 PROCEDURE — 25000003 PHARM REV CODE 250

## 2024-01-25 PROCEDURE — 99900035 HC TECH TIME PER 15 MIN (STAT)

## 2024-01-25 PROCEDURE — 20000000 HC ICU ROOM

## 2024-01-25 PROCEDURE — 93005 ELECTROCARDIOGRAM TRACING: CPT

## 2024-01-25 PROCEDURE — 25000242 PHARM REV CODE 250 ALT 637 W/ HCPCS

## 2024-01-25 PROCEDURE — 85025 COMPLETE CBC W/AUTO DIFF WBC: CPT | Performed by: INTERNAL MEDICINE

## 2024-01-25 PROCEDURE — 93010 ELECTROCARDIOGRAM REPORT: CPT | Mod: 76,,, | Performed by: INTERNAL MEDICINE

## 2024-01-25 PROCEDURE — 80053 COMPREHEN METABOLIC PANEL: CPT | Performed by: INTERNAL MEDICINE

## 2024-01-25 PROCEDURE — 63600175 PHARM REV CODE 636 W HCPCS: Performed by: NURSE PRACTITIONER

## 2024-01-25 PROCEDURE — 94761 N-INVAS EAR/PLS OXIMETRY MLT: CPT

## 2024-01-25 PROCEDURE — 63600175 PHARM REV CODE 636 W HCPCS: Performed by: STUDENT IN AN ORGANIZED HEALTH CARE EDUCATION/TRAINING PROGRAM

## 2024-01-25 PROCEDURE — 83735 ASSAY OF MAGNESIUM: CPT | Performed by: INTERNAL MEDICINE

## 2024-01-25 PROCEDURE — 94640 AIRWAY INHALATION TREATMENT: CPT

## 2024-01-25 PROCEDURE — 84100 ASSAY OF PHOSPHORUS: CPT | Performed by: INTERNAL MEDICINE

## 2024-01-25 PROCEDURE — 25000003 PHARM REV CODE 250: Performed by: INTERNAL MEDICINE

## 2024-01-25 PROCEDURE — 93010 ELECTROCARDIOGRAM REPORT: CPT | Mod: ,,, | Performed by: INTERNAL MEDICINE

## 2024-01-25 RX ORDER — KETOROLAC TROMETHAMINE 30 MG/ML
15 INJECTION, SOLUTION INTRAMUSCULAR; INTRAVENOUS ONCE
Status: COMPLETED | OUTPATIENT
Start: 2024-01-25 | End: 2024-01-25

## 2024-01-25 RX ORDER — KETOROLAC TROMETHAMINE 30 MG/ML
30 INJECTION, SOLUTION INTRAMUSCULAR; INTRAVENOUS EVERY 6 HOURS
Status: DISPENSED | OUTPATIENT
Start: 2024-01-25 | End: 2024-01-26

## 2024-01-25 RX ORDER — IPRATROPIUM BROMIDE AND ALBUTEROL SULFATE 2.5; .5 MG/3ML; MG/3ML
3 SOLUTION RESPIRATORY (INHALATION) EVERY 6 HOURS PRN
Status: DISCONTINUED | OUTPATIENT
Start: 2024-01-25 | End: 2024-01-27 | Stop reason: HOSPADM

## 2024-01-25 RX ORDER — SULFAMETHOXAZOLE AND TRIMETHOPRIM 800; 160 MG/1; MG/1
1 TABLET ORAL DAILY
Status: DISCONTINUED | OUTPATIENT
Start: 2024-01-27 | End: 2024-01-27 | Stop reason: HOSPADM

## 2024-01-25 RX ORDER — POTASSIUM CHLORIDE 14.9 MG/ML
20 INJECTION INTRAVENOUS
Status: DISCONTINUED | OUTPATIENT
Start: 2024-01-25 | End: 2024-01-25

## 2024-01-25 RX ORDER — POTASSIUM CHLORIDE 14.9 MG/ML
20 INJECTION INTRAVENOUS
Status: COMPLETED | OUTPATIENT
Start: 2024-01-25 | End: 2024-01-25

## 2024-01-25 RX ORDER — NITROGLYCERIN 0.4 MG/1
0.4 TABLET SUBLINGUAL ONCE
Status: COMPLETED | OUTPATIENT
Start: 2024-01-25 | End: 2024-01-25

## 2024-01-25 RX ORDER — BUPRENORPHINE 2 MG/1
4 TABLET SUBLINGUAL 2 TIMES DAILY
Status: DISCONTINUED | OUTPATIENT
Start: 2024-01-25 | End: 2024-01-27 | Stop reason: HOSPADM

## 2024-01-25 RX ORDER — MAGNESIUM SULFATE HEPTAHYDRATE 40 MG/ML
4 INJECTION, SOLUTION INTRAVENOUS ONCE
Status: COMPLETED | OUTPATIENT
Start: 2024-01-25 | End: 2024-01-25

## 2024-01-25 RX ADMIN — POTASSIUM CHLORIDE 20 MEQ: 14.9 INJECTION, SOLUTION INTRAVENOUS at 08:01

## 2024-01-25 RX ADMIN — MAGNESIUM SULFATE IN WATER 4 G: 40 INJECTION, SOLUTION INTRAVENOUS at 03:01

## 2024-01-25 RX ADMIN — EMTRICITABINE AND TENOFOVIR ALAFENAMIDE 1 TABLET: 200; 25 TABLET ORAL at 10:01

## 2024-01-25 RX ADMIN — DOLUTEGRAVIR SODIUM 50 MG: 50 TABLET, FILM COATED ORAL at 10:01

## 2024-01-25 RX ADMIN — NITROGLYCERIN 0.4 MG: 0.4 TABLET SUBLINGUAL at 03:01

## 2024-01-25 RX ADMIN — KETOROLAC TROMETHAMINE 30 MG: 30 INJECTION INTRAMUSCULAR; INTRAVENOUS at 11:01

## 2024-01-25 RX ADMIN — TRAZODONE HYDROCHLORIDE 100 MG: 100 TABLET ORAL at 08:01

## 2024-01-25 RX ADMIN — BUPRENORPHINE 8 MG: 8 TABLET SUBLINGUAL at 08:01

## 2024-01-25 RX ADMIN — POTASSIUM CHLORIDE 20 MEQ: 14.9 INJECTION, SOLUTION INTRAVENOUS at 12:01

## 2024-01-25 RX ADMIN — POTASSIUM CHLORIDE 20 MEQ: 14.9 INJECTION, SOLUTION INTRAVENOUS at 06:01

## 2024-01-25 RX ADMIN — BUSPIRONE HYDROCHLORIDE 30 MG: 15 TABLET ORAL at 08:01

## 2024-01-25 RX ADMIN — ALPRAZOLAM 0.25 MG: 0.25 TABLET ORAL at 03:01

## 2024-01-25 RX ADMIN — MAGNESIUM SULFATE IN WATER 2 G: 40 INJECTION, SOLUTION INTRAVENOUS at 05:01

## 2024-01-25 RX ADMIN — SULFAMETHOXAZOLE AND TRIMETHOPRIM 1 TABLET: 800; 160 TABLET ORAL at 10:01

## 2024-01-25 RX ADMIN — IPRATROPIUM BROMIDE AND ALBUTEROL SULFATE 3 ML: 2.5; .5 SOLUTION RESPIRATORY (INHALATION) at 03:01

## 2024-01-25 RX ADMIN — BUPRENORPHINE 4 MG: 2 TABLET SUBLINGUAL at 08:01

## 2024-01-25 RX ADMIN — POTASSIUM CHLORIDE 20 MEQ: 14.9 INJECTION, SOLUTION INTRAVENOUS at 10:01

## 2024-01-25 RX ADMIN — ALPRAZOLAM 0.25 MG: 0.25 TABLET ORAL at 08:01

## 2024-01-25 RX ADMIN — SULFAMETHOXAZOLE AND TRIMETHOPRIM 1 TABLET: 800; 160 TABLET ORAL at 08:01

## 2024-01-25 RX ADMIN — THIAMINE HCL TAB 100 MG 100 MG: 100 TAB at 10:01

## 2024-01-25 RX ADMIN — KETOROLAC TROMETHAMINE 15 MG: 30 INJECTION, SOLUTION INTRAMUSCULAR; INTRAVENOUS at 06:01

## 2024-01-25 NOTE — PROGRESS NOTES
"  [facility]    Cardiology  Consult Note    Patient Name: Britton Perez  MRN: [id number]  Admission Date: 1/21/2024  Hospital Length of Stay: 4 days  Code Status: Full Code   Attending Provider: Cami Gallardo MD   Consulting Provider: NICOLAS Baldwin  Primary Care Physician: Kike Muller MD  Principal Problem:VT (ventricular tachycardia)    Patient information was obtained from patient, past medical records, ER records, and primary team.     Subjective:   Consultation Reason: VT/VF Arrest Requiring ICD Shock    HPI: Mr. Perez is a 33 year old male, unknown to [facility], who presented to the hospital from assisted after having been found down with seizure like activity. He was brought to [facility] ED where he reportedly had 4 episodes of Vtach/vfib and was immediately cardioverted by his ICD. With subsequent shocks patient would apparently be awake  and alert but notably bradycardic. He was then transferred to [facility] due to the lack of cardiology services. When he arrived at [facility] he went into VT yet again and was shocked by his device. [facility] was consulted and recommended amiodarone infusion. He is admitted to ICU for close monitoring. [facility] is consulted for cardiac evaluation/management.    1.23.24: NAD. Resting. No Ectopy Noted.   1.24.24: NAD. No Ectopy, Continues to be Overdriven via Pacemaker (TVP). "I am fine." Denies CP, SOB and Palps. + Dizziness with Position Changes. PT stood up to Urinate and became Hypotensive. IVF Bolus in Progress.   1.25.24: NAD. Revoked DNR and wants to Seek Treatment. Some Episodes of NSVT. Long Discussion with PT about TVP/Active Fixation Lead. "I feel the pacemaker beating in my heart." Paced at 70 bpm. Denies SOB and CP. + Palps.     PMH: Hypertension, Hepatitis C, HIV, Polysubstance Abuse, PTSD, Seizures, Neuropathy, Old MI, Depression, Cardiac Arrest/ICD, NICMO  PSH:  LHC, SQ ICD, Device Placement & Removal, Tracheostomy with Removal, Chest Tube Insertion/Removal  Family " History: None  Social History: Tobacco- Active Use, Positive for Polysubstance Abuse- IV Methamphetamine Use, Alcohol- Negative    Previous Cardiac Diagnostics:   Echocardiogram (1.22.24):  Left Ventricle: The left ventricle is normal in size. Normal wall thickness. Normal wall motion. There is normal systolic function with a visually estimated ejection fraction of 55 - 60%. There is normal diastolic function.  Right Ventricle: Normal right ventricular cavity size. Systolic function is normal. TAPSE is 2.04 cm.    Echocardiogram (8.15.21):  RVSP ~ 30mmHg   Normal left ventricle cavity size. Normal wall thickness. Mild to   moderately (40-45%) decreased ejection fraction.   Moderate mitral regurgitation.   Mild to moderate tricuspid regurgitation. Mild pulmonary hypertension present.     Coronary Angiogram (8.30.19):  Angiographically normal coronary arteries.  LV end diastolic pressure is normal.    Review of patient's allergies indicates:   Allergen Reactions    Penicillins      No current facility-administered medications on file prior to encounter.     Current Outpatient Medications on File Prior to Encounter   Medication Sig    albuterol (PROVENTIL/VENTOLIN HFA) 90 mcg/actuation inhaler Inhale 1 puff into the lungs as needed.    BIKTARVY -25 mg per tablet Take 1 tablet by mouth once daily.    buprenorphine-naloxone (SUBOXONE) 8-2 mg Film Place 1 each under the tongue once daily.     busPIRone (BUSPAR) 30 MG Tab Take 1 tablet by mouth 2 (two) times daily.    lidocaine (LIDODERM) 5 % Place 1 patch onto the skin once daily.    LORazepam (ATIVAN) 0.5 MG tablet Take 1 tablet (0.5 mg total) by mouth 3 (three) times daily as needed (withdrawal symptoms).    thiamine 100 MG tablet Take 1 tablet (100 mg total) by mouth once daily.     Review of Systems   Constitutional:  Positive for fatigue.   Respiratory:  Negative for cough, chest tightness and shortness of breath.    Cardiovascular:  Positive for palpitations  (Feels PPM in Heart Beating). Negative for chest pain.   Gastrointestinal:  Negative for nausea and vomiting.   All other systems reviewed and are negative.    Objective:     Vital Signs (Most Recent):  Temp: 98.4 °F (36.9 °C) (01/25/24 1200)  Pulse: 71 (01/25/24 1300)  Resp: (!) 21 (01/25/24 1300)  BP: 98/62 (01/25/24 0600)  SpO2: 98 % (01/25/24 1300) Vital Signs (24h Range):  Temp:  [97.8 °F (36.6 °C)-98.4 °F (36.9 °C)] 98.4 °F (36.9 °C)  Pulse:  [] 71  Resp:  [10-28] 21  SpO2:  [93 %-100 %] 98 %  BP: ()/(57-80) 98/62  Arterial Line BP: (110-158)/() 156/87     Weight: 70.3 kg (155 lb)  Body mass index is 19.89 kg/m².    SpO2: 98 %         Intake/Output Summary (Last 24 hours) at 1/25/2024 1311  Last data filed at 1/25/2024 1144  Gross per 24 hour   Intake 1439.78 ml   Output 4000 ml   Net -2560.22 ml       Lines/Drains/Airways       Arterial Line  Duration             Arterial Line 01/22/24 0101 Left Radial 3 days              Peripheral Intravenous Line  Duration                  Peripheral IV - Single Lumen 01/22/24 0345 20 G Anterior;Left Forearm 3 days         Sheath 01/22/24 1108 Right 3 days                  Significant Labs:  Recent Results (from the past 72 hour(s))   Blood Culture    Collection Time: 01/22/24  6:34 PM    Specimen: Arm, Right; Blood   Result Value Ref Range    CULTURE, BLOOD ([facility]) No Growth At 48 Hours    Magnesium    Collection Time: 01/22/24  6:34 PM   Result Value Ref Range    Magnesium Level 2.20 1.60 - 2.60 mg/dL   Potassium    Collection Time: 01/22/24  6:34 PM   Result Value Ref Range    Potassium Level 4.1 3.5 - 5.1 mmol/L   SYPHILIS ANTIBODY (WITH REFLEX RPR)    Collection Time: 01/22/24  6:34 PM   Result Value Ref Range    Syphilis Antibody Nonreactive Nonreactive, Equivocal   Magnesium    Collection Time: 01/23/24 12:09 AM   Result Value Ref Range    Magnesium Level 2.20 1.60 - 2.60 mg/dL   Comprehensive Metabolic Panel    Collection Time: 01/23/24 12:09 AM    Result Value Ref Range    Sodium Level 142 136 - 145 mmol/L    Potassium Level 4.1 3.5 - 5.1 mmol/L    Chloride 113 (H) 98 - 107 mmol/L    Carbon Dioxide 23 22 - 29 mmol/L    Glucose Level 147 (H) 74 - 100 mg/dL    Blood Urea Nitrogen 13.5 8.9 - 20.6 mg/dL    Creatinine 0.82 0.73 - 1.18 mg/dL    Calcium Level Total 9.3 8.4 - 10.2 mg/dL    Protein Total 7.3 6.4 - 8.3 gm/dL    Albumin Level 3.4 (L) 3.5 - 5.0 g/dL    Globulin 3.9 (H) 2.4 - 3.5 gm/dL    Albumin/Globulin Ratio 0.9 (L) 1.1 - 2.0 ratio    Bilirubin Total 0.6 <=1.5 mg/dL    Alkaline Phosphatase 72 40 - 150 unit/L    Alanine Aminotransferase 6 0 - 55 unit/L    Aspartate Aminotransferase 12 5 - 34 unit/L    eGFR >60 mls/min/1.73/m2   Phosphorus    Collection Time: 01/23/24 12:09 AM   Result Value Ref Range    Phosphorus Level 2.9 2.3 - 4.7 mg/dL   CBC with Differential    Collection Time: 01/23/24 12:09 AM   Result Value Ref Range    WBC 11.35 4.50 - 11.50 x10(3)/mcL    RBC 5.28 4.70 - 6.10 x10(6)/mcL    Hgb 15.2 14.0 - 18.0 g/dL    Hct 44.3 42.0 - 52.0 %    MCV 83.9 80.0 - 94.0 fL    MCH 28.8 27.0 - 31.0 pg    MCHC 34.3 33.0 - 36.0 g/dL    RDW 13.0 11.5 - 17.0 %    Platelet 165 130 - 400 x10(3)/mcL    MPV 9.6 7.4 - 10.4 fL    Neut % 79.3 %    Lymph % 13.4 %    Mono % 6.8 %    Eos % 0.0 %    Basophil % 0.2 %    Lymph # 1.52 0.6 - 4.6 x10(3)/mcL    Neut # 9.01 2.1 - 9.2 x10(3)/mcL    Mono # 0.77 0.1 - 1.3 x10(3)/mcL    Eos # 0.00 0 - 0.9 x10(3)/mcL    Baso # 0.02 <=0.2 x10(3)/mcL    IG# 0.03 0 - 0.04 x10(3)/mcL    IG% 0.3 %    NRBC% 0.0 %   VANCOMYCIN, TROUGH    Collection Time: 01/23/24  9:51 AM   Result Value Ref Range    Vancomycin Trough 35.3 (HH) 15.0 - 20.0 ug/ml   MRSA PCR    Collection Time: 01/23/24  1:23 PM   Result Value Ref Range    MRSA PCR SCRN ([facility]) Not Detected Not Detected   Vancomycin, Random    Collection Time: 01/23/24  6:24 PM   Result Value Ref Range    Vanc Lvl Random 15.9 15.0 - 20.0 ug/ml   Troponin I    Collection Time: 01/23/24   6:24 PM   Result Value Ref Range    Troponin-I 0.142 (H) 0.000 - 0.045 ng/mL   APTT    Collection Time: 01/23/24  8:23 PM   Result Value Ref Range    PTT 28.1 23.2 - 33.7 seconds   Protime-INR    Collection Time: 01/23/24  8:23 PM   Result Value Ref Range    PT 16.6 (H) 12.5 - 14.5 seconds    INR 1.4 (H) <=1.3   Troponin I    Collection Time: 01/24/24 12:07 AM   Result Value Ref Range    Troponin-I 0.148 (H) 0.000 - 0.045 ng/mL   Magnesium    Collection Time: 01/24/24 12:07 AM   Result Value Ref Range    Magnesium Level 2.00 1.60 - 2.60 mg/dL   Phosphorus    Collection Time: 01/24/24 12:07 AM   Result Value Ref Range    Phosphorus Level 3.7 2.3 - 4.7 mg/dL   Comprehensive Metabolic Panel    Collection Time: 01/24/24 12:07 AM   Result Value Ref Range    Sodium Level 141 136 - 145 mmol/L    Potassium Level 3.5 3.5 - 5.1 mmol/L    Chloride 110 (H) 98 - 107 mmol/L    Carbon Dioxide 21 (L) 22 - 29 mmol/L    Glucose Level 108 (H) 74 - 100 mg/dL    Blood Urea Nitrogen 14.1 8.9 - 20.6 mg/dL    Creatinine 0.82 0.73 - 1.18 mg/dL    Calcium Level Total 8.7 8.4 - 10.2 mg/dL    Protein Total 6.5 6.4 - 8.3 gm/dL    Albumin Level 3.2 (L) 3.5 - 5.0 g/dL    Globulin 3.3 2.4 - 3.5 gm/dL    Albumin/Globulin Ratio 1.0 (L) 1.1 - 2.0 ratio    Bilirubin Total 0.5 <=1.5 mg/dL    Alkaline Phosphatase 65 40 - 150 unit/L    Alanine Aminotransferase 7 0 - 55 unit/L    Aspartate Aminotransferase 17 5 - 34 unit/L    eGFR >60 mls/min/1.73/m2   CBC with Differential    Collection Time: 01/24/24 12:07 AM   Result Value Ref Range    WBC 8.60 4.50 - 11.50 x10(3)/mcL    RBC 5.05 4.70 - 6.10 x10(6)/mcL    Hgb 14.6 14.0 - 18.0 g/dL    Hct 40.9 (L) 42.0 - 52.0 %    MCV 81.0 80.0 - 94.0 fL    MCH 28.9 27.0 - 31.0 pg    MCHC 35.7 33.0 - 36.0 g/dL    RDW 12.9 11.5 - 17.0 %    Platelet 142 130 - 400 x10(3)/mcL    MPV 10.6 (H) 7.4 - 10.4 fL    Neut % 69.8 %    Lymph % 24.5 %    Mono % 5.3 %    Eos % 0.1 %    Basophil % 0.2 %    Lymph # 2.11 0.6 - 4.6  x10(3)/mcL    Neut # 5.99 2.1 - 9.2 x10(3)/mcL    Mono # 0.46 0.1 - 1.3 x10(3)/mcL    Eos # 0.01 0 - 0.9 x10(3)/mcL    Baso # 0.02 <=0.2 x10(3)/mcL    IG# 0.01 0 - 0.04 x10(3)/mcL    IG% 0.1 %    NRBC% 0.0 %   Troponin I    Collection Time: 01/24/24  9:13 AM   Result Value Ref Range    Troponin-I 0.185 (H) 0.000 - 0.045 ng/mL   Basic Metabolic Panel    Collection Time: 01/24/24  9:44 PM   Result Value Ref Range    Sodium Level 143 136 - 145 mmol/L    Potassium Level 3.1 (L) 3.5 - 5.1 mmol/L    Chloride 110 (H) 98 - 107 mmol/L    Carbon Dioxide 23 22 - 29 mmol/L    Glucose Level 97 74 - 100 mg/dL    Blood Urea Nitrogen 8.6 (L) 8.9 - 20.6 mg/dL    Creatinine 0.86 0.73 - 1.18 mg/dL    BUN/Creatinine Ratio 10     Calcium Level Total 8.3 (L) 8.4 - 10.2 mg/dL    Anion Gap 10.0 mEq/L    eGFR >60 mls/min/1.73/m2   Comprehensive Metabolic Panel    Collection Time: 01/25/24  4:24 AM   Result Value Ref Range    Sodium Level 141 136 - 145 mmol/L    Potassium Level 2.9 (L) 3.5 - 5.1 mmol/L    Chloride 108 (H) 98 - 107 mmol/L    Carbon Dioxide 22 22 - 29 mmol/L    Glucose Level 92 74 - 100 mg/dL    Blood Urea Nitrogen 7.4 (L) 8.9 - 20.6 mg/dL    Creatinine 0.84 0.73 - 1.18 mg/dL    Calcium Level Total 8.7 8.4 - 10.2 mg/dL    Protein Total 6.2 (L) 6.4 - 8.3 gm/dL    Albumin Level 3.4 (L) 3.5 - 5.0 g/dL    Globulin 2.8 2.4 - 3.5 gm/dL    Albumin/Globulin Ratio 1.2 1.1 - 2.0 ratio    Bilirubin Total 0.5 <=1.5 mg/dL    Alkaline Phosphatase 64 40 - 150 unit/L    Alanine Aminotransferase 8 0 - 55 unit/L    Aspartate Aminotransferase 19 5 - 34 unit/L    eGFR >60 mls/min/1.73/m2   Phosphorus    Collection Time: 01/25/24  4:24 AM   Result Value Ref Range    Phosphorus Level 2.8 2.3 - 4.7 mg/dL   Magnesium    Collection Time: 01/25/24  4:24 AM   Result Value Ref Range    Magnesium Level 1.80 1.60 - 2.60 mg/dL   CBC with Differential    Collection Time: 01/25/24  4:24 AM   Result Value Ref Range    WBC 8.84 4.50 - 11.50 x10(3)/mcL    RBC  4.40 (L) 4.70 - 6.10 x10(6)/mcL    Hgb 12.7 (L) 14.0 - 18.0 g/dL    Hct 36.5 (L) 42.0 - 52.0 %    MCV 83.0 80.0 - 94.0 fL    MCH 28.9 27.0 - 31.0 pg    MCHC 34.8 33.0 - 36.0 g/dL    RDW 12.8 11.5 - 17.0 %    Platelet 127 (L) 130 - 400 x10(3)/mcL    MPV 10.5 (H) 7.4 - 10.4 fL    Neut % 74.1 %    Lymph % 18.9 %    Mono % 6.6 %    Eos % 0.0 %    Basophil % 0.1 %    Lymph # 1.67 0.6 - 4.6 x10(3)/mcL    Neut # 6.55 2.1 - 9.2 x10(3)/mcL    Mono # 0.58 0.1 - 1.3 x10(3)/mcL    Eos # 0.00 0 - 0.9 x10(3)/mcL    Baso # 0.01 <=0.2 x10(3)/mcL    IG# 0.03 0 - 0.04 x10(3)/mcL    IG% 0.3 %    NRBC% 0.0 %    IPF 5.0 0.9 - 11.2 %   Troponin I    Collection Time: 01/25/24  4:24 AM   Result Value Ref Range    Troponin-I 0.470 (H) 0.000 - 0.045 ng/mL     Significant Imaging:  Imaging Results              X-Ray Chest 1 View (Final result)  Result time 01/22/24 08:24:03      Final result by Leiws Malhotra MD (01/22/24 08:24:03)                   Impression:      No significant abnormalities      Electronically signed by: Lewis Malhotra  Date:    01/22/2024  Time:    08:24               Narrative:    EXAMINATION:  XR CHEST 1 VIEW    CPT 76387    CLINICAL HISTORY:  lateral to look at icd;    FINDINGS:  Only lateral projection is provided lung fields appear to be clear and free of gross infiltrates atelectasis or effusions.  ICD device is identified                                    Telemetry: Paced    Physical Exam  Vitals and nursing note reviewed.   Constitutional:       General: He is not in acute distress.     Appearance: Normal appearance. He is ill-appearing.   HENT:      Head: Normocephalic.      Mouth/Throat:      Mouth: Mucous membranes are moist.   Eyes:      Extraocular Movements: Extraocular movements intact.      Conjunctiva/sclera: Conjunctivae normal.   Cardiovascular:      Rate and Rhythm: Normal rate and regular rhythm.      Heart sounds: Normal heart sounds. No murmur heard.     Comments: Paced  Pulmonary:       Effort: Pulmonary effort is normal. No respiratory distress.      Breath sounds: Normal breath sounds.   Abdominal:      Palpations: Abdomen is soft.   Musculoskeletal:         General: Normal range of motion.      Cervical back: Neck supple.      Right lower leg: No edema.      Left lower leg: No edema.   Skin:     General: Skin is warm and dry.      Comments: R Lateral Neck Active Fixation/Generator   Neurological:      General: No focal deficit present.      Mental Status: He is alert and oriented to person, place, and time. Mental status is at baseline.   Psychiatric:         Mood and Affect: Mood normal.         Behavior: Behavior normal.       Home Medications:   No current facility-administered medications on file prior to encounter.     Current Outpatient Medications on File Prior to Encounter   Medication Sig Dispense Refill    albuterol (PROVENTIL/VENTOLIN HFA) 90 mcg/actuation inhaler Inhale 1 puff into the lungs as needed.  5    BIKTARVY -25 mg per tablet Take 1 tablet by mouth once daily.  0    buprenorphine-naloxone (SUBOXONE) 8-2 mg Film Place 1 each under the tongue once daily.       busPIRone (BUSPAR) 30 MG Tab Take 1 tablet by mouth 2 (two) times daily.      lidocaine (LIDODERM) 5 % Place 1 patch onto the skin once daily.  0    LORazepam (ATIVAN) 0.5 MG tablet Take 1 tablet (0.5 mg total) by mouth 3 (three) times daily as needed (withdrawal symptoms). 12 tablet 0    thiamine 100 MG tablet Take 1 tablet (100 mg total) by mouth once daily.       Current Inpatient Medications:    Current Facility-Administered Medications:     ALPRAZolam tablet 0.25 mg, 0.25 mg, Oral, TID, Sushila Del Rio MD, 0.25 mg at 01/25/24 0802    bismuth subsalicylate 262 mg/15 mL suspension 30 mL, 30 mL, Oral, Daily PRN, Pam Menjivar MD    buprenorphine HCL SL tablet 8 mg, 8 mg, Sublingual, Daily, Sushila Del Rio MD, 8 mg at 01/25/24 0802    busPIRone tablet 30 mg, 30 mg, Oral, BID, Sushila Del Rio MD, 30 mg at  01/25/24 0802    calcium carbonate 200 mg calcium (500 mg) chewable tablet 500 mg, 500 mg, Oral, Daily PRN, Pam Menjivar MD    dolutegravir Tab 50 mg, 50 mg, Oral, Daily, Virginia Jennings FNP, 50 mg at 01/25/24 1018    emtricitabine-tenofovir alafen 200-25 mg Tab 1 tablet, 1 tablet, Oral, Daily, Virginia Jennings FNP, 1 tablet at 01/25/24 1018    enoxaparin injection 40 mg, 40 mg, Subcutaneous, Daily, Raz Musa DO    LIDOcaine 2000 mg in D5W 250 mL infusion, 0.5 mg/min, Intravenous, Continuous, Ayad Kwong MD, Stopped at 01/24/24 0911    LIDOcaine 4 % cream, , Topical (Top), Once, Cami Gallardo MD    magnesium sulfate 2g in water 50mL IVPB (premix), 2 g, Intravenous, PRN, Raz Musa DO, Stopped at 01/25/24 0754    mupirocin 2 % ointment, , Nasal, BID, Cami Gallardo MD    potassium chloride 10 mEq in 100 mL IVPB, 20 mEq, Intravenous, Daily PRN, Raz Musa DO, Stopped at 01/25/24 0038    sodium chloride 0.9% flush 10 mL, 10 mL, Intravenous, PRN, Raz Musa DO    sulfamethoxazole-trimethoprim 800-160mg per tablet 1 tablet, 1 tablet, Oral, BID, Virginia Jennings FNP, 1 tablet at 01/25/24 1017    [START ON 1/27/2024] sulfamethoxazole-trimethoprim 800-160mg per tablet 1 tablet, 1 tablet, Oral, Daily, Julian Sidhu MD    thiamine tablet 100 mg, 100 mg, Oral, Daily, Raz Musa DO, 100 mg at 01/25/24 1018    traZODone tablet 100 mg, 100 mg, Oral, QHS, Pierre Vega MD, 100 mg at 01/24/24 2002    VTE Risk Mitigation (From admission, onward)           Ordered     enoxaparin injection 40 mg  Daily         01/21/24 2211     IP VTE HIGH RISK PATIENT  Once         01/21/24 2211                  Assessment:   VT Storm Requiring Defibrillation- Multiple Shocks since Admission s/p TVP Overdrive Pacing    - s/p Active Fixation (1.22.24)    - Troponin Values Normal   Orthostatic Hypotension - Improved with IVF Resuscitation   Prolonged Qtc - Improving   History of NICMO/EF 40-45% - Now  Recovered with EF 55-60%    - Status Post Subcutaneous ICD    - Normal Coronaries in 2019  History of MI  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 2021)  Polysubstance Abuse - With Delirium Tremors     - Opioid Disorder (Heroin) & IV Meth Use    - Nicotine Dependence  History of Hepatitis C  HIV    - On Biktarvy  History of Right PCA Mycotic Aneurysm  History of Right Parieto-Occipital ICH  DNR Status     Plan:   EKG Reviewed - QT Interval Improving   Continue TVP Pacing at 70 bpm   Active Fixation Precautions   Keep K > 4.0 and Mg > 2.0  Replete Lytes   Labs and EKG in AM: CBC, CMP and Mg    Shawn Montelongo, ANP  Cardiology  [facility]  01/25/2024     Physician addendum:  I have seen and examined this patient as a split-shared visit with the BAM d/t complicated medical management of above problems written in assessment and high acuity requiring physician expertise in medical decision-making. I performed the substantive portion of the history and exam. Above medical decision-making is also formulated by me.    Cardiovascular exam:  S1, S2  Lungs:  fine crackles at bases.  Extremities:  1+ edema bilaterally    Plan:  QT interval is significantly better.  Patient had potassium of 2.9.  Continue correcting electrolytes.  Continue temporary venous pacemaker due to concern prolongation in the setting of hypokalemia.  Once QTC prolongation resolves we will discontinue temporary with pacing.      Ayad Kwong MD  Cardiologist

## 2024-01-25 NOTE — NURSING
Nurses Note -- 4 Eyes      1/25/2024   1:48 AM      Skin assessed during: Q Shift Change      [x] No Altered Skin Integrity Present    [x]Prevention Measures Documented      [] Yes- Altered Skin Integrity Present or Discovered   [] LDA Added if Not in Epic (Describe Wound)   [] New Altered Skin Integrity was Present on Admit and Documented in LDA   [] Wound Image Taken    Wound Care Consulted? No    Attending Nurse:  Donna Orellana RN/Staff Member:   SCOTTY Nava

## 2024-01-25 NOTE — NURSING
Patient disconnected pacer wires multiple times throughout the night. Around 0130, patient spoke to Dr. Menjivar and agreed to being paced at 70 instead of 90. At 0240, patient again disconnect pacer wires and is refusing to reconnect them. Explained the risks of refusing the pacer and the patient still would not allow me to reconnect it. MD was notified.     At 0255, patient reconnected pacer wires.

## 2024-01-25 NOTE — PROGRESS NOTES
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 Patient recently incarcerated 2 days ago. Today he was found to be having seizure like activity in his cell and was brought to [facility] ED where he reportedly had 4 episodes of Vtach/vfib and was immediately cardioverted by his ICD. With subsequent shocks patient would apparently be awake  and alert but notably bradycardic. He was then transferred to [facility] due to the lack of cardiology services. Upon arrival to our ED patient again went into vtach and was shocked with return of normal circulation. Cardiology was consulted in the ED and they recommended he be started on Amiodarone. Additionally the ICD rep has been called out for interrogation of the device. Currently the patient denies any chest pain, shortness of breath, or nausea. However, he did experience these symptoms during the arthymmic events.     Hospital Course/Significant events:      24 Hour Interval History:  Patient had an episode of tachycardia to 116. Rest of vitals remained stable. Patient still complaining of chest pain overnight.  Troponins remained elevated 24 hours ago. Will continue to trend until downtrend. Patient requested to speak to with [facility] to have ICD removed. States he feels it as it  paces in his chest.  Left thumb improving. No longer in pain.    Past Medical History:   Diagnosis Date    Cardiac arrest 08/2019    pulseless, AED required    Depression     Heart failure, type unknown     fr other facility medical record    Hepatitis C     HIV (human immunodeficiency virus infection)     HTN (hypertension)     IV drug user     Myocardial infarct, old     fr transfer medical records    Neuropathy     left foot    Overdose     heroine, meth    Pneumonia     Polysubstance abuse     heroin methadone    PTSD (post-traumatic stress disorder)     from prev medical record    Seizure 08/2019    POSSIBLE Seizure prior to cardiac arrest, unclear       Past Surgical History:   Procedure Laterality Date    CHEST TUBE INSERTION Bilateral     bilat when intubated in the past (removed)    CORONARY ANGIOGRAPHY N/A 8/30/2019    Procedure: ANGIOGRAM, CORONARY ARTERY;  Surgeon: Misbah Culp MD;  Location: [facility] CATH;  Service: Cardiology;  Laterality: N/A;    GASTROSTOMY TUBE PLACEMENT      fr other facility medical record    INSERTION OF PACEMAKER  2007    INSERTION, PACEMAKER, TEMPORARY TRANSVENOUS Right 1/22/2024    Procedure: Insertion, Pacemaker, Temporary Transvenous;  Surgeon: Thad Interiano MD;  Location: [facility] CATH LAB;  Service: Cardiology;  Laterality: Right;    LEFT HEART CATHETERIZATION Left 8/30/2019    Procedure: Left heart cath;  Surgeon: Misbah Culp MD;  Location: [facility] CATH;  Service: Cardiology;  Laterality: Left;    REMOVAL OF PACEMAKER  2014    TRACHEOSTOMY      x2 in the past (removed/healed as of August 2019)       Social History     Socioeconomic History    Marital status: Single   Tobacco Use    Smoking status: Every Day   Substance and Sexual Activity    Drug use: Yes     Types: IV, Methamphetamines     Comment: heroine opiates           Current Outpatient Medications   Medication Instructions    albuterol (PROVENTIL/VENTOLIN HFA) 90 mcg/actuation inhaler 1 puff, Inhalation, As needed  (PRN)    BIKTARVY -25 mg per tablet 1 tablet, Oral, Daily    buprenorphine-naloxone (SUBOXONE) 8-2 mg Film 1 each, Sublingual, Daily    busPIRone (BUSPAR) 30 MG Tab 1 tablet, Oral, 2 times daily    lidocaine (LIDODERM) 5 % 1 patch, Transdermal, Daily    LORazepam (ATIVAN) 0.5 mg, Oral, 3 times daily PRN    thiamine 100 mg, Oral, Daily       Current Inpatient Medications   ALPRAZolam  0.25 mg Oral TID    buprenorphine HCL  8 mg Sublingual Daily    busPIRone  30 mg Oral BID    dolutegravir  50 mg Oral Daily    emtricitabine-tenofovir alafen  1 tablet Oral Daily    enoxaparin  40 mg Subcutaneous Daily    famotidine  20 mg Oral BID    LIDOcaine   Topical (Top) Once    mupirocin   Nasal BID    potassium chloride in water  20 mEq Intravenous Q2H    sulfamethoxazole-trimethoprim 800-160mg  1 tablet Oral BID    [START ON 1/27/2024] sulfamethoxazole-trimethoprim 800-160mg  1 tablet Oral Daily    thiamine  100 mg Oral Daily    traZODone  100 mg Oral QHS       Current Intravenous Infusions   LIDOcaine Stopped (01/24/24 0911)         Review of Systems   Constitutional:  Negative for chills and fever.   Eyes:  Negative for double vision.   Respiratory:  Negative for cough and shortness of breath.    Cardiovascular:  Positive for chest pain. Negative for palpitations and orthopnea.   Gastrointestinal:  Positive for abdominal pain and nausea. Negative for diarrhea and vomiting.   Musculoskeletal:  Negative for back pain, myalgias and neck pain.   Neurological:  Negative for dizziness and headaches.          Objective:       Intake/Output Summary (Last 24 hours) at 1/25/2024 0945  Last data filed at 1/25/2024 0623  Gross per 24 hour   Intake 3918.02 ml   Output 4000 ml   Net -81.98 ml         Vital Signs (Most Recent):  Temp: 98.4 °F (36.9 °C) (01/25/24 0400)  Pulse: 70 (01/25/24 0900)  Resp: 16 (01/25/24 0900)  BP: 98/62 (01/25/24 0600)  SpO2: 98 % (01/25/24 0900)  Body mass index is 19.89 kg/m².  Weight: 70.3 kg (155 lb)  Vital Signs (24h Range):  Temp:  [97.8 °F (36.6 °C)-98.4 °F (36.9 °C)] 98.4 °F (36.9 °C)  Pulse:  [] 70  Resp:  [10-28] 16  SpO2:  [93 %-100 %] 98 %  BP: ()/(57-86) 98/62  Arterial Line BP: ()/() 125/66     Physical Exam  Vitals and nursing note reviewed.   Constitutional:       General: He is not in acute distress.     Appearance: He is normal weight. He is not ill-appearing or toxic-appearing.   HENT:      Head: Normocephalic and atraumatic.      Mouth/Throat:      Mouth: Mucous membranes are moist.      Pharynx: Oropharynx is clear.   Eyes:      Extraocular Movements: Extraocular movements intact.      Conjunctiva/sclera: Conjunctivae normal.      Pupils: Pupils are equal, round, and reactive to light.   Cardiovascular:      Rate and Rhythm: Normal rate and regular rhythm.      Heart sounds: No murmur heard.  Pulmonary:      Effort: Pulmonary effort is normal. No respiratory distress.      Breath sounds: No wheezing or rhonchi.   Chest:      Comments: Bilateral scars present. Well healed.  Abdominal:      General: A surgical scar is present.   Musculoskeletal:         General: No swelling.      Right hand: Normal.      Right lower leg: No edema.      Left lower leg: No edema.      Comments: Left thumb with some erythema. No swelling, drainage or bleeding.   Skin:     General: Skin is warm and dry.   Neurological:      General: No focal deficit present.      Mental Status: He is alert and oriented to person, place, and time.           Lines/Drains/Airways       Arterial Line  Duration             Arterial Line 01/22/24 0101 Left Radial 3 days              Peripheral Intravenous Line  Duration                  Peripheral IV - Single Lumen 01/22/24 0345 20 G Anterior;Left Forearm 3 days         Sheath 01/22/24 1108 Right 2 days                    Significant Labs:    Lab Results   Component Value Date    WBC 8.84 01/25/2024    HGB 12.7 (L) 01/25/2024    HCT 36.5 (L) 01/25/2024    MCV 83.0  "01/25/2024     (L) 01/25/2024           BMP  Lab Results   Component Value Date     01/25/2024    K 2.9 (L) 01/25/2024    CHLORIDE 108 (H) 01/25/2024    CO2 22 01/25/2024    BUN 7.4 (L) 01/25/2024    CREATININE 0.84 01/25/2024    CALCIUM 8.7 01/25/2024    AGAP 10.0 01/24/2024    ESTGFRAFRICA >105 10/01/2021    EGFRNONAA >105 09/10/2021         ABG  No results for input(s): "PH", "PO2", "PCO2", "HCO3", "POCBASEDEF" in the last 168 hours.    Mechanical Ventilation Support:         Significant Imaging:  I have reviewed the pertinent imaging within the past 24 hours.        Assessment/Plan:     Assessment  VT storm  Prolonged QTc  Hx of MI 2007 with HFrEF s/p AICD  Hx of recurrent endocarditis with multiple AICD replacements (most recent 9/21)  Opioid use disorder (Heroin)  Thumb infection  Hx of Hep C, HIV/AIDS, R PCA mycotic aneurysm, R parieto-occipital ICH      Plan  1.Cardiology following. [facility]IS had long discussion with patient pertaining to ICD placement. Patient agreeable to keep in place. Modifications made to decrease sensitivity to pacing. Continue to monitor closely.  2. CD4 count 62.  Viral load currently pending.  3. Hold HIV medications until further records obtained to determine viral status.  3. Bactrim for thumb cellulitis and osteomyelitis prophylaxis.  4. Continue Suboxone for withdrawals.  5. Restarted anxiolytics. Hold off on additional Ativan and Morphine at this time.        Patient now electing to be FULL status.   I have spoken with the patient at length about the patient's CODE STATUS. He, without coercion or bias, made a decision to make this decision.    Britton Harvard    CODE STATUS: FULL    DVT Prophylaxis:Lovenox  GI Prophylaxis:famotidine     35 minutes of critical care was time spent personally by me on the following activities: development of treatment plan with patient or surrogate and bedside caregivers, discussions with consultants, evaluation of patient's response to " treatment, examination of patient, ordering and performing treatments and interventions, ordering and review of laboratory studies, ordering and review of radiographic studies, pulse oximetry, re-evaluation of patient's condition.  This critical care time did not overlap with that of any other provider or involve time for any procedures.     Aston Antunez MD  Pulmonary Critical Care Medicine  [facility] - Emergency Dept  DOS: 01/25/2024

## 2024-01-25 NOTE — NURSING
Nurses Note -- 4 Eyes      1/25/2024   8:08 AM      Skin assessed during: Daily Assessment      [x] No Altered Skin Integrity Present    [x]Prevention Measures Documented      [] Yes- Altered Skin Integrity Present or Discovered   [] LDA Added if Not in Epic (Describe Wound)   [] New Altered Skin Integrity was Present on Admit and Documented in LDA   [] Wound Image Taken    Wound Care Consulted? No    Attending Nurse:  Love Orellana RN/Staff Member:   SCOTTY Colon

## 2024-01-26 LAB
ALBUMIN SERPL-MCNC: 3.6 G/DL (ref 3.5–5)
ALBUMIN/GLOB SERPL: 1.2 RATIO (ref 1.1–2)
ALP SERPL-CCNC: 66 UNIT/L (ref 40–150)
ALT SERPL-CCNC: 9 UNIT/L (ref 0–55)
ANION GAP SERPL CALC-SCNC: 7 MEQ/L
AST SERPL-CCNC: 15 UNIT/L (ref 5–34)
BASOPHILS # BLD AUTO: 0.02 X10(3)/MCL
BASOPHILS NFR BLD AUTO: 0.2 %
BILIRUB SERPL-MCNC: 0.5 MG/DL
BUN SERPL-MCNC: 5.4 MG/DL (ref 8.9–20.6)
BUN SERPL-MCNC: 5.6 MG/DL (ref 8.9–20.6)
CALCIUM SERPL-MCNC: 8.5 MG/DL (ref 8.4–10.2)
CALCIUM SERPL-MCNC: 8.8 MG/DL (ref 8.4–10.2)
CHLORIDE SERPL-SCNC: 109 MMOL/L (ref 98–107)
CHLORIDE SERPL-SCNC: 110 MMOL/L (ref 98–107)
CO2 SERPL-SCNC: 22 MMOL/L (ref 22–29)
CO2 SERPL-SCNC: 23 MMOL/L (ref 22–29)
CREAT SERPL-MCNC: 0.89 MG/DL (ref 0.73–1.18)
CREAT SERPL-MCNC: 0.9 MG/DL (ref 0.73–1.18)
CREAT/UREA NIT SERPL: 6
EOSINOPHIL # BLD AUTO: 0.04 X10(3)/MCL (ref 0–0.9)
EOSINOPHIL NFR BLD AUTO: 0.5 %
ERYTHROCYTE [DISTWIDTH] IN BLOOD BY AUTOMATED COUNT: 13.1 % (ref 11.5–17)
GFR SERPLBLD CREATININE-BSD FMLA CKD-EPI: >60 MLS/MIN/1.73/M2
GFR SERPLBLD CREATININE-BSD FMLA CKD-EPI: >60 MLS/MIN/1.73/M2
GLOBULIN SER-MCNC: 3.1 GM/DL (ref 2.4–3.5)
GLUCOSE SERPL-MCNC: 88 MG/DL (ref 74–100)
GLUCOSE SERPL-MCNC: 89 MG/DL (ref 74–100)
HCT VFR BLD AUTO: 40 % (ref 42–52)
HGB BLD-MCNC: 14.2 G/DL (ref 14–18)
IMM GRANULOCYTES # BLD AUTO: 0.02 X10(3)/MCL (ref 0–0.04)
IMM GRANULOCYTES NFR BLD AUTO: 0.2 %
LYMPHOCYTES # BLD AUTO: 2.86 X10(3)/MCL (ref 0.6–4.6)
LYMPHOCYTES NFR BLD AUTO: 33.1 %
MAGNESIUM SERPL-MCNC: 2.9 MG/DL (ref 1.6–2.6)
MCH RBC QN AUTO: 29.1 PG (ref 27–31)
MCHC RBC AUTO-ENTMCNC: 35.5 G/DL (ref 33–36)
MCV RBC AUTO: 82 FL (ref 80–94)
MONOCYTES # BLD AUTO: 0.59 X10(3)/MCL (ref 0.1–1.3)
MONOCYTES NFR BLD AUTO: 6.8 %
NEUTROPHILS # BLD AUTO: 5.12 X10(3)/MCL (ref 2.1–9.2)
NEUTROPHILS NFR BLD AUTO: 59.2 %
NRBC BLD AUTO-RTO: 0 %
PHOSPHATE SERPL-MCNC: 3.4 MG/DL (ref 2.3–4.7)
PLATELET # BLD AUTO: 124 X10(3)/MCL (ref 130–400)
PLATELETS.RETICULATED NFR BLD AUTO: 4.2 % (ref 0.9–11.2)
PMV BLD AUTO: 10.1 FL (ref 7.4–10.4)
POTASSIUM SERPL-SCNC: 3.2 MMOL/L (ref 3.5–5.1)
POTASSIUM SERPL-SCNC: 3.8 MMOL/L (ref 3.5–5.1)
PROT SERPL-MCNC: 6.7 GM/DL (ref 6.4–8.3)
RBC # BLD AUTO: 4.88 X10(6)/MCL (ref 4.7–6.1)
SODIUM SERPL-SCNC: 139 MMOL/L (ref 136–145)
SODIUM SERPL-SCNC: 140 MMOL/L (ref 136–145)
WBC # SPEC AUTO: 8.65 X10(3)/MCL (ref 4.5–11.5)

## 2024-01-26 PROCEDURE — 25000003 PHARM REV CODE 250

## 2024-01-26 PROCEDURE — 25000003 PHARM REV CODE 250: Performed by: INTERNAL MEDICINE

## 2024-01-26 PROCEDURE — 85025 COMPLETE CBC W/AUTO DIFF WBC: CPT | Performed by: INTERNAL MEDICINE

## 2024-01-26 PROCEDURE — 93010 ELECTROCARDIOGRAM REPORT: CPT | Mod: ,,, | Performed by: INTERNAL MEDICINE

## 2024-01-26 PROCEDURE — 93005 ELECTROCARDIOGRAM TRACING: CPT

## 2024-01-26 PROCEDURE — 93010 ELECTROCARDIOGRAM REPORT: CPT | Mod: 59,,, | Performed by: INTERNAL MEDICINE

## 2024-01-26 PROCEDURE — 93010 ELECTROCARDIOGRAM REPORT: CPT | Mod: 76,,, | Performed by: INTERNAL MEDICINE

## 2024-01-26 PROCEDURE — 84100 ASSAY OF PHOSPHORUS: CPT | Performed by: INTERNAL MEDICINE

## 2024-01-26 PROCEDURE — 63600175 PHARM REV CODE 636 W HCPCS: Performed by: INTERNAL MEDICINE

## 2024-01-26 PROCEDURE — 25000003 PHARM REV CODE 250: Performed by: NURSE PRACTITIONER

## 2024-01-26 PROCEDURE — 80053 COMPREHEN METABOLIC PANEL: CPT | Performed by: INTERNAL MEDICINE

## 2024-01-26 PROCEDURE — 83735 ASSAY OF MAGNESIUM: CPT | Performed by: INTERNAL MEDICINE

## 2024-01-26 PROCEDURE — 11000001 HC ACUTE MED/SURG PRIVATE ROOM

## 2024-01-26 RX ORDER — POTASSIUM CHLORIDE 20 MEQ/1
40 TABLET, EXTENDED RELEASE ORAL ONCE
Status: COMPLETED | OUTPATIENT
Start: 2024-01-26 | End: 2024-01-26

## 2024-01-26 RX ADMIN — ALPRAZOLAM 0.25 MG: 0.25 TABLET ORAL at 08:01

## 2024-01-26 RX ADMIN — SULFAMETHOXAZOLE AND TRIMETHOPRIM 1 TABLET: 800; 160 TABLET ORAL at 09:01

## 2024-01-26 RX ADMIN — POTASSIUM CHLORIDE 40 MEQ: 1500 TABLET, EXTENDED RELEASE ORAL at 04:01

## 2024-01-26 RX ADMIN — POTASSIUM CHLORIDE 20 MEQ: 10 INJECTION, SOLUTION INTRAVENOUS at 04:01

## 2024-01-26 RX ADMIN — THIAMINE HCL TAB 100 MG 100 MG: 100 TAB at 08:01

## 2024-01-26 RX ADMIN — BUPRENORPHINE 4 MG: 2 TABLET SUBLINGUAL at 10:01

## 2024-01-26 RX ADMIN — BUPRENORPHINE 4 MG: 2 TABLET SUBLINGUAL at 08:01

## 2024-01-26 RX ADMIN — BICTEGRAVIR SODIUM, EMTRICITABINE, AND TENOFOVIR ALAFENAMIDE FUMARATE 1 TABLET: 50; 200; 25 TABLET ORAL at 10:01

## 2024-01-26 RX ADMIN — POTASSIUM CHLORIDE 40 MEQ: 1500 TABLET, EXTENDED RELEASE ORAL at 08:01

## 2024-01-26 RX ADMIN — SULFAMETHOXAZOLE AND TRIMETHOPRIM 1 TABLET: 800; 160 TABLET ORAL at 08:01

## 2024-01-26 RX ADMIN — TRAZODONE HYDROCHLORIDE 100 MG: 100 TABLET ORAL at 08:01

## 2024-01-26 RX ADMIN — BUSPIRONE HYDROCHLORIDE 30 MG: 15 TABLET ORAL at 08:01

## 2024-01-26 RX ADMIN — ALPRAZOLAM 0.25 MG: 0.25 TABLET ORAL at 04:01

## 2024-01-26 NOTE — H&P
[facility]  Hospital Medicine History & Physical Examination       Patient Name: Britton Perez  MRN: [id number]  Patient Class: IP- Inpatient   Admission Date: 1/21/2024   Admitting Physician: Cami Gallardo MD  Length of Stay: 5  Attending Physician: Mary Rashid MD  Primary Care Provider: Kike Muller MD  Face-to-Face encounter date: 01/26/2024  Code Status:Full code   Chief Complaint: Chest Pain (Transfer from [facility] with runs of V-fib,V-tach w/ defib shocks)        Patient information was obtained from patient, patient's family, past medical records and ER records.     HISTORY OF PRESENT ILLNESS:   Britton Perez is a 33 y.o. male with a past medical history of essential hypertension, NICMO, cardiac arrest/ICD, prolonged QT syndrome, endocarditis, IVDU, ICH, hepatitis-C, HIV, seizure disorder, PTSD, and depression presented to [facility] on 1/21/2024 transferred from [facility] for higher level of care.  Patient had reported seizure-like activity while in custodial on day of presentation.  Patient reported he was detoxing from heroin.  Patient was taken to [facility] ED with reportedly 4 episodes of V-tach/VFib and was cardioverted by his ICD.  Post cardioversion patient was bradycardic in the 40s.  Patient was transferred to [facility] ED for cardiology services.  Upon arrival to [facility] ED went into V-tach again and was shocked with return of normal circulation.  Cardiology was consulted with recommendations of amniodarone. AICD was interrogated and noted to be functional.  Patient was admitted to ICU for close monitoring.  Patient elected DNR status and requested hospice. Patient was started lidocaine infusion in addition to Levaquin and Vancomycin for right thumb cellulitis. TVP was placed on 1/22. Patient's CD4 count was 62  and infectious disease was consulted.  Antibiotics were transitioned to Bactrim BID x7 days with decreased to 1 tab daily for OI prophylaxis. Patient was started on Suboxone for  withdrawals. Biktarvy was not available and patient did not have his own, therefore Descovy and Tivicay were started.  Patient revoked DNR status on 01/25.  Cardiology had long discussion with patient and patient elected for temporary pacemaker to be extracted and declined implantation for continuous pacing. Patient was cleared for downgrade out of ICU on 01/26/2023. Hospital medicine team was consulted for transition of care and further medical management. On exam patient denied complaints.    PAST MEDICAL HISTORY:   Essential hypertension  NICMO  Cardiac arrest/ICD   Endocarditis   IV drug use  ICH   Hepatitis-C  HIV  Seizure disorder  PTSD  Depression    PAST SURGICAL HISTORY:     Past Surgical History:   Procedure Laterality Date    CHEST TUBE INSERTION Bilateral     bilat when intubated in the past (removed)    CORONARY ANGIOGRAPHY N/A 8/30/2019    Procedure: ANGIOGRAM, CORONARY ARTERY;  Surgeon: Misbah Culp MD;  Location: [facility] CATH;  Service: Cardiology;  Laterality: N/A;    GASTROSTOMY TUBE PLACEMENT      fr other facility medical record    INSERTION OF PACEMAKER  2007    INSERTION, PACEMAKER, TEMPORARY TRANSVENOUS Right 1/22/2024    Procedure: Insertion, Pacemaker, Temporary Transvenous;  Surgeon: Thad Interiano MD;  Location: [facility] CATH LAB;  Service: Cardiology;  Laterality: Right;    LEFT HEART CATHETERIZATION Left 8/30/2019    Procedure: Left heart cath;  Surgeon: Misbah Culp MD;  [facility] CATH;  Service: Cardiology;  Laterality: Left;    REMOVAL OF PACEMAKER  2014    TRACHEOSTOMY      x2 in the past (removed/healed as of August 2019)       ALLERGIES:   Penicillins    FAMILY HISTORY:   Father's side: DM and QT prolongation     SOCIAL HISTORY:   Smokes a pack of cigarettes daily  +Heroin   Denied alcohol use      HOME MEDICATIONS:     Prior to Admission medications    Medication Sig Start Date End Date Taking? Authorizing Provider   albuterol (PROVENTIL/VENTOLIN HFA) 90 mcg/actuation inhaler  Inhale 1 puff into the lungs as needed. 5/24/19   Provider, Historical   BIKTARVY -25 mg per tablet Take 1 tablet by mouth once daily. 8/17/19   Provider, Historical   buprenorphine-naloxone (SUBOXONE) 8-2 mg Film Place 1 each under the tongue once daily.  7/24/19   Provider, Historical   busPIRone (BUSPAR) 30 MG Tab Take 1 tablet by mouth 2 (two) times daily. 7/15/19   Provider, Historical   lidocaine (LIDODERM) 5 % Place 1 patch onto the skin once daily. 7/25/19   Provider, Historical   LORazepam (ATIVAN) 0.5 MG tablet Take 1 tablet (0.5 mg total) by mouth 3 (three) times daily as needed (withdrawal symptoms). 8/31/19 9/30/19  Jamarcus Jane MD   thiamine 100 MG tablet Take 1 tablet (100 mg total) by mouth once daily. 9/2/19   Jamarcus Jane MD       REVIEW OF SYSTEMS:   Except as documented, all other systems reviewed and negative     PHYSICAL EXAM:     VITAL SIGNS: 24 HRS MIN & MAX LAST   Temp  Min: 97.9 °F (36.6 °C)  Max: 98.6 °F (37 °C) 97.9 °F (36.6 °C)   BP  Min: 96/81  Max: 124/76 108/68   Pulse  Min: 60  Max: 84  60   Resp  Min: 10  Max: 25 17   SpO2  Min: 94 %  Max: 99 % (!) 94 %       General appearance: Male in no apparent distress.  HEENNT: Atraumatic head.   Lungs: Clear to auscultation bilaterally.   Heart: Regular rate and rhythm.    Abdomen: Soft, non-distended, non-tender. Bowel sounds are normal.   Extremities:. No deformities.   Neuro: Awake, alert, and oriented. Motor and sensory exams grossly intact.   Psych/mental status: Appropriate mood and affect. Responds appropriately to questions.     LABS AND IMAGING:     Recent Labs   Lab 01/24/24  0007 01/25/24  0424 01/26/24  0212   WBC 8.60 8.84 8.65   RBC 5.05 4.40* 4.88   HGB 14.6 12.7* 14.2   HCT 40.9* 36.5* 40.0*   MCV 81.0 83.0 82.0   MCH 28.9 28.9 29.1   MCHC 35.7 34.8 35.5   RDW 12.9 12.8 13.1    127* 124*   MPV 10.6* 10.5* 10.1       Recent Labs   Lab 01/24/24  0007 01/24/24  2144 01/25/24  0424 01/26/24  0212  01/26/24  1031      < > 141 139 140   K 3.5   < > 2.9* 3.2* 3.8   CO2 21*   < > 22 22 23   BUN 14.1   < > 7.4* 5.6* 5.4*   CREATININE 0.82   < > 0.84 0.89 0.90   CALCIUM 8.7   < > 8.7 8.5 8.8   MG 2.00  --  1.80 2.90*  --    ALBUMIN 3.2*  --  3.4* 3.6  --    ALKPHOS 65  --  64 66  --    ALT 7  --  8 9  --    AST 17  --  19 15  --    BILITOT 0.5  --  0.5 0.5  --     < > = values in this interval not displayed.       Microbiology Results (last 7 days)       Procedure Component Value Units Date/Time    Blood Culture [[id number]]  (Normal) Collected: 01/22/24 0914    Order Status: Completed Specimen: Arterial Blood Line Updated: 01/26/24 1101     CULTURE, BLOOD ([facility]) No Growth At 96 Hours    Blood Culture [[id number]]  (Normal) Collected: 01/22/24 1834    Order Status: Completed Specimen: Blood from Arm, Right Updated: 01/25/24 2100     CULTURE, BLOOD ([facility]) No Growth At 72 Hours             Electrophysiology Procedure    The patient tolerated procedure well.    The patient left the lab in stable condition.    There were no immediate complications.    Successful temporary pacing catheter placement.    I certify that I was present for the critical steps of the procedure   including the diagnostic, surgical and/or interventional portions.     Procedure Log documented by Documenter: Darryn Almonte RN and verified by   Thad Interiano MD.    Date: 1/22/2024  Time: 11:20 AM        ASSESSMENT & PLAN:   Assessment:  VT storm requiring defibrillation s/p TVP   Prolonged QTC   Right thumb cellulitis   HIV  IV drug use  Essential hypertension  NICMO  Cardiac arrest/ICD   Endocarditis   ICH   Hepatitis-C  Seizure disorder  PTSD  Depression    Plan:  Cardiac monitoring   TVP removed today   Patient refused implantation for continuous pacing   Close electrolyte monitoring   Continue Suboxone for withdrawals   Continue Bactrim for OI prophylaxis  Continue Descovy and Tivicay  Continue additional home medications as deemed appropriate    Labs in a.m.  Further recommendations per attending MD     VTE Prophylaxis: Lovenox     __________________________________________________________________________  INPATIENT LIST OF MEDICATIONS     Scheduled Meds:   ALPRAZolam  0.25 mg Oral TID    aqexibdfs-zxepgsmo-nstviux ala  1 tablet Oral Daily    buprenorphine HCL  4 mg Sublingual BID    busPIRone  30 mg Oral BID    enoxaparin  40 mg Subcutaneous Daily    LIDOcaine   Topical (Top) Once    mupirocin   Nasal BID    sulfamethoxazole-trimethoprim 800-160mg  1 tablet Oral BID    [START ON 1/27/2024] sulfamethoxazole-trimethoprim 800-160mg  1 tablet Oral Daily    thiamine  100 mg Oral Daily    traZODone  100 mg Oral QHS     Continuous Infusions:   LIDOcaine Stopped (01/24/24 0911)     PRN Meds:.albuterol-ipratropium, bismuth subsalicylate, calcium carbonate, magnesium sulfate IVPB, potassium chloride, sodium chloride 0.9%      IEfren PA-C, have reviewed and discussed the case with Dr. Mary Rashid MD  Please see the following addendum for further assessment and plan from there attending MD.    01/26/2024    ________________________________________________________________________________    MD Addendum:  IMary MD  assumed care of this patient today   For the patient encounter, I performed the substantive portion of the visit, I reviewed the PA documentation, treatment plan, and medical decision making.  I had face to face time with this patient     33 Y O M with HIV on ART, substance Abuse,  Cardiac Arrest/ICD, NICMO admitted to ICU for mgt of VT requiring TV pacing, which is now removed ,downgrading  to floors today for further care.     Pt was resting comfortably, denied any complaints at this time of my interview.    General: calm, alert, awake  Heart: regular normal rate, ICD device left side of chest  Chest: unlabored breathing   Skin: Tattoos, multiple surgical scars , scar at neck from prior trach    Charts reviewed, care  discussed with pt's nurse.   Cont to monitor on tele  Cards signing off today, case discussed with Cardiologist Dr Kwong, follow cards recommendations   Cont ART and TMP/SMX per ID   Case mngt consult  Ordered K po 40, labs from today noted recent K 3.8  Follow psych recs on meds  Vitamin supplementation   Home meds reviewed lised in EMR, reported pt on suboxone prior to admit  Labs in AM, replete lytes aggressively to keep K >4, mag >2  Get EKG prn with any change in tele rhythms, closely monitor QTC  Case mngt consult for DC planning    Code status: Full code          01/26/2024

## 2024-01-26 NOTE — H&P
Patient name: Britton Perez  MRN: [id number]  : 1990  Cath Lab Procedure H&P Update    Pre-Procedure Assessment:    I saw and examined the patient face to face. The patient has been re-evaluated and his condition is unchanged. The reason for admission, procedure and care is still present.  Based on the patients H&P, pre-procedure physical exam, relevant diagnostic studies, NPO status and information obtained from the patient, I determine the patient is an appropriate candidate for the proposed procedure and anesthesia planned. I further certify the anesthesia risks, benefits and options have been explained to the patient to which he agrees as documented on the procedural consent.    See scanned updated H&P and additional records from media tab.      Rock Hargrove

## 2024-01-26 NOTE — PROGRESS NOTES
"  [facility]    Cardiology  Consult Note    Patient Name: Britton Perez  MRN: [id number]  Admission Date: 1/21/2024  Hospital Length of Stay: 5 days  Code Status: Full Code   Attending Provider: Cami Gallardo MD   Consulting Provider: NICOLAS Baldwin  Primary Care Physician: Kike Muller MD  Principal Problem:VT (ventricular tachycardia)    Patient information was obtained from patient, past medical records, ER records, and primary team.     Subjective:   Consultation Reason: VT/VF Arrest Requiring ICD Shock    HPI: Mr. Perez is a 33 year old male, unknown to [facility], who presented to the hospital from long term after having been found down with seizure like activity. He was brought to [facility] ED where he reportedly had 4 episodes of Vtach/vfib and was immediately cardioverted by his ICD. With subsequent shocks patient would apparently be awake  and alert but notably bradycardic. He was then transferred to [facility] due to the lack of cardiology services. When he arrived at [facility] he went into VT yet again and was shocked by his device. [facility] was consulted and recommended amiodarone infusion. He is admitted to ICU for close monitoring. [facility] is consulted for cardiac evaluation/management.    1.23.24: NAD. Resting. No Ectopy Noted.   1.24.24: NAD. No Ectopy, Continues to be Overdriven via Pacemaker (TVP). "I am fine." Denies CP, SOB and Palps. + Dizziness with Position Changes. PT stood up to Urinate and became Hypotensive. IVF Bolus in Progress.   1.25.24: NAD. Revoked DNR and wants to Seek Treatment. Some Episodes of NSVT. Long Discussion with PT about TVP/Active Fixation Lead. "I feel the pacemaker beating in my heart." Paced at 70 bpm. Denies SOB and CP. + Palps.   1.26.24: NAD. PT wanting TVP/Active Fixation Lead Removed. "I want it out." Denies CP, SOB and Palps.     PMH: Hypertension, Hepatitis C, HIV, Polysubstance Abuse, PTSD, Seizures, Neuropathy, Old MI, Depression, Cardiac Arrest/ICD, NICMO  PSH:  [facility], " SQ ICD, Device Placement & Removal, Tracheostomy with Removal, Chest Tube Insertion/Removal  Family History: None  Social History: Tobacco- Active Use, Positive for Polysubstance Abuse- IV Methamphetamine Use, Alcohol- Negative    Previous Cardiac Diagnostics:   Echocardiogram (1.22.24):  Left Ventricle: The left ventricle is normal in size. Normal wall thickness. Normal wall motion. There is normal systolic function with a visually estimated ejection fraction of 55 - 60%. There is normal diastolic function.  Right Ventricle: Normal right ventricular cavity size. Systolic function is normal. TAPSE is 2.04 cm.    Echocardiogram (8.15.21):  RVSP ~ 30mmHg   Normal left ventricle cavity size. Normal wall thickness. Mild to   moderately (40-45%) decreased ejection fraction.   Moderate mitral regurgitation.   Mild to moderate tricuspid regurgitation. Mild pulmonary hypertension present.     Coronary Angiogram (8.30.19):  Angiographically normal coronary arteries.  LV end diastolic pressure is normal.    Review of patient's allergies indicates:   Allergen Reactions    Penicillins      No current facility-administered medications on file prior to encounter.     Current Outpatient Medications on File Prior to Encounter   Medication Sig    albuterol (PROVENTIL/VENTOLIN HFA) 90 mcg/actuation inhaler Inhale 1 puff into the lungs as needed.    BIKTARVY -25 mg per tablet Take 1 tablet by mouth once daily.    buprenorphine-naloxone (SUBOXONE) 8-2 mg Film Place 1 each under the tongue once daily.     busPIRone (BUSPAR) 30 MG Tab Take 1 tablet by mouth 2 (two) times daily.    lidocaine (LIDODERM) 5 % Place 1 patch onto the skin once daily.    LORazepam (ATIVAN) 0.5 MG tablet Take 1 tablet (0.5 mg total) by mouth 3 (three) times daily as needed (withdrawal symptoms).    thiamine 100 MG tablet Take 1 tablet (100 mg total) by mouth once daily.     Review of Systems   Constitutional:  Negative for fatigue.   Respiratory:  Negative  for cough, chest tightness and shortness of breath.    Cardiovascular:  Negative for chest pain and palpitations.   Gastrointestinal:  Negative for nausea and vomiting.   All other systems reviewed and are negative.    Objective:     Vital Signs (Most Recent):  Temp: 97.9 °F (36.6 °C) (01/26/24 0800)  Pulse: 70 (01/26/24 1000)  Resp: 18 (01/26/24 1000)  BP: 108/68 (01/26/24 0400)  SpO2: (!) 94 % (01/26/24 0800) Vital Signs (24h Range):  Temp:  [97.9 °F (36.6 °C)-98.6 °F (37 °C)] 97.9 °F (36.6 °C)  Pulse:  [70-84] 70  Resp:  [10-25] 18  SpO2:  [94 %-99 %] 94 %  BP: ()/(68-81) 108/68  Arterial Line BP: ()/(42-87) 132/66     Weight: 70.3 kg (155 lb)  Body mass index is 19.89 kg/m².    SpO2: (!) 94 %         Intake/Output Summary (Last 24 hours) at 1/26/2024 1046  Last data filed at 1/26/2024 0635  Gross per 24 hour   Intake 504.58 ml   Output 2850 ml   Net -2345.42 ml       Lines/Drains/Airways       Arterial Line  Duration             Arterial Line 01/22/24 0101 Left Radial 4 days              Peripheral Intravenous Line  Duration                  Peripheral IV - Single Lumen 01/22/24 0345 20 G Anterior;Left Forearm 4 days         Sheath 01/22/24 1108 Right 3 days         Peripheral IV - Single Lumen 01/25/24 0700 20 G Anterior;Right Forearm 1 day                  Significant Labs:  Recent Results (from the past 72 hour(s))   MRSA PCR    Collection Time: 01/23/24  1:23 PM   Result Value Ref Range    MRSA PCR SCRN ([facility]) Not Detected Not Detected   Vancomycin, Random    Collection Time: 01/23/24  6:24 PM   Result Value Ref Range    Vanc Lvl Random 15.9 15.0 - 20.0 ug/ml   Troponin I    Collection Time: 01/23/24  6:24 PM   Result Value Ref Range    Troponin-I 0.142 (H) 0.000 - 0.045 ng/mL   APTT    Collection Time: 01/23/24  8:23 PM   Result Value Ref Range    PTT 28.1 23.2 - 33.7 seconds   Protime-INR    Collection Time: 01/23/24  8:23 PM   Result Value Ref Range    PT 16.6 (H) 12.5 - 14.5 seconds    INR 1.4  (H) <=1.3   HIV RNA, Quantitative, PCR    Collection Time: 01/24/24 12:07 AM   Result Value Ref Range    HIV-1 RNA Detect/Quant, P 3000 (A) Undetected copies/mL   Troponin I    Collection Time: 01/24/24 12:07 AM   Result Value Ref Range    Troponin-I 0.148 (H) 0.000 - 0.045 ng/mL   Magnesium    Collection Time: 01/24/24 12:07 AM   Result Value Ref Range    Magnesium Level 2.00 1.60 - 2.60 mg/dL   Phosphorus    Collection Time: 01/24/24 12:07 AM   Result Value Ref Range    Phosphorus Level 3.7 2.3 - 4.7 mg/dL   Comprehensive Metabolic Panel    Collection Time: 01/24/24 12:07 AM   Result Value Ref Range    Sodium Level 141 136 - 145 mmol/L    Potassium Level 3.5 3.5 - 5.1 mmol/L    Chloride 110 (H) 98 - 107 mmol/L    Carbon Dioxide 21 (L) 22 - 29 mmol/L    Glucose Level 108 (H) 74 - 100 mg/dL    Blood Urea Nitrogen 14.1 8.9 - 20.6 mg/dL    Creatinine 0.82 0.73 - 1.18 mg/dL    Calcium Level Total 8.7 8.4 - 10.2 mg/dL    Protein Total 6.5 6.4 - 8.3 gm/dL    Albumin Level 3.2 (L) 3.5 - 5.0 g/dL    Globulin 3.3 2.4 - 3.5 gm/dL    Albumin/Globulin Ratio 1.0 (L) 1.1 - 2.0 ratio    Bilirubin Total 0.5 <=1.5 mg/dL    Alkaline Phosphatase 65 40 - 150 unit/L    Alanine Aminotransferase 7 0 - 55 unit/L    Aspartate Aminotransferase 17 5 - 34 unit/L    eGFR >60 mls/min/1.73/m2   CBC with Differential    Collection Time: 01/24/24 12:07 AM   Result Value Ref Range    WBC 8.60 4.50 - 11.50 x10(3)/mcL    RBC 5.05 4.70 - 6.10 x10(6)/mcL    Hgb 14.6 14.0 - 18.0 g/dL    Hct 40.9 (L) 42.0 - 52.0 %    MCV 81.0 80.0 - 94.0 fL    MCH 28.9 27.0 - 31.0 pg    MCHC 35.7 33.0 - 36.0 g/dL    RDW 12.9 11.5 - 17.0 %    Platelet 142 130 - 400 x10(3)/mcL    MPV 10.6 (H) 7.4 - 10.4 fL    Neut % 69.8 %    Lymph % 24.5 %    Mono % 5.3 %    Eos % 0.1 %    Basophil % 0.2 %    Lymph # 2.11 0.6 - 4.6 x10(3)/mcL    Neut # 5.99 2.1 - 9.2 x10(3)/mcL    Mono # 0.46 0.1 - 1.3 x10(3)/mcL    Eos # 0.01 0 - 0.9 x10(3)/mcL    Baso # 0.02 <=0.2 x10(3)/mcL    IG# 0.01  0 - 0.04 x10(3)/mcL    IG% 0.1 %    NRBC% 0.0 %   Troponin I    Collection Time: 01/24/24  9:13 AM   Result Value Ref Range    Troponin-I 0.185 (H) 0.000 - 0.045 ng/mL   Basic Metabolic Panel    Collection Time: 01/24/24  9:44 PM   Result Value Ref Range    Sodium Level 143 136 - 145 mmol/L    Potassium Level 3.1 (L) 3.5 - 5.1 mmol/L    Chloride 110 (H) 98 - 107 mmol/L    Carbon Dioxide 23 22 - 29 mmol/L    Glucose Level 97 74 - 100 mg/dL    Blood Urea Nitrogen 8.6 (L) 8.9 - 20.6 mg/dL    Creatinine 0.86 0.73 - 1.18 mg/dL    BUN/Creatinine Ratio 10     Calcium Level Total 8.3 (L) 8.4 - 10.2 mg/dL    Anion Gap 10.0 mEq/L    eGFR >60 mls/min/1.73/m2   Comprehensive Metabolic Panel    Collection Time: 01/25/24  4:24 AM   Result Value Ref Range    Sodium Level 141 136 - 145 mmol/L    Potassium Level 2.9 (L) 3.5 - 5.1 mmol/L    Chloride 108 (H) 98 - 107 mmol/L    Carbon Dioxide 22 22 - 29 mmol/L    Glucose Level 92 74 - 100 mg/dL    Blood Urea Nitrogen 7.4 (L) 8.9 - 20.6 mg/dL    Creatinine 0.84 0.73 - 1.18 mg/dL    Calcium Level Total 8.7 8.4 - 10.2 mg/dL    Protein Total 6.2 (L) 6.4 - 8.3 gm/dL    Albumin Level 3.4 (L) 3.5 - 5.0 g/dL    Globulin 2.8 2.4 - 3.5 gm/dL    Albumin/Globulin Ratio 1.2 1.1 - 2.0 ratio    Bilirubin Total 0.5 <=1.5 mg/dL    Alkaline Phosphatase 64 40 - 150 unit/L    Alanine Aminotransferase 8 0 - 55 unit/L    Aspartate Aminotransferase 19 5 - 34 unit/L    eGFR >60 mls/min/1.73/m2   Phosphorus    Collection Time: 01/25/24  4:24 AM   Result Value Ref Range    Phosphorus Level 2.8 2.3 - 4.7 mg/dL   Magnesium    Collection Time: 01/25/24  4:24 AM   Result Value Ref Range    Magnesium Level 1.80 1.60 - 2.60 mg/dL   CBC with Differential    Collection Time: 01/25/24  4:24 AM   Result Value Ref Range    WBC 8.84 4.50 - 11.50 x10(3)/mcL    RBC 4.40 (L) 4.70 - 6.10 x10(6)/mcL    Hgb 12.7 (L) 14.0 - 18.0 g/dL    Hct 36.5 (L) 42.0 - 52.0 %    MCV 83.0 80.0 - 94.0 fL    MCH 28.9 27.0 - 31.0 pg    MCHC 34.8  33.0 - 36.0 g/dL    RDW 12.8 11.5 - 17.0 %    Platelet 127 (L) 130 - 400 x10(3)/mcL    MPV 10.5 (H) 7.4 - 10.4 fL    Neut % 74.1 %    Lymph % 18.9 %    Mono % 6.6 %    Eos % 0.0 %    Basophil % 0.1 %    Lymph # 1.67 0.6 - 4.6 x10(3)/mcL    Neut # 6.55 2.1 - 9.2 x10(3)/mcL    Mono # 0.58 0.1 - 1.3 x10(3)/mcL    Eos # 0.00 0 - 0.9 x10(3)/mcL    Baso # 0.01 <=0.2 x10(3)/mcL    IG# 0.03 0 - 0.04 x10(3)/mcL    IG% 0.3 %    NRBC% 0.0 %    IPF 5.0 0.9 - 11.2 %   Troponin I    Collection Time: 01/25/24  4:24 AM   Result Value Ref Range    Troponin-I 0.470 (H) 0.000 - 0.045 ng/mL   Magnesium    Collection Time: 01/26/24  2:12 AM   Result Value Ref Range    Magnesium Level 2.90 (H) 1.60 - 2.60 mg/dL   Phosphorus    Collection Time: 01/26/24  2:12 AM   Result Value Ref Range    Phosphorus Level 3.4 2.3 - 4.7 mg/dL   Comprehensive Metabolic Panel    Collection Time: 01/26/24  2:12 AM   Result Value Ref Range    Sodium Level 139 136 - 145 mmol/L    Potassium Level 3.2 (L) 3.5 - 5.1 mmol/L    Chloride 109 (H) 98 - 107 mmol/L    Carbon Dioxide 22 22 - 29 mmol/L    Glucose Level 89 74 - 100 mg/dL    Blood Urea Nitrogen 5.6 (L) 8.9 - 20.6 mg/dL    Creatinine 0.89 0.73 - 1.18 mg/dL    Calcium Level Total 8.5 8.4 - 10.2 mg/dL    Protein Total 6.7 6.4 - 8.3 gm/dL    Albumin Level 3.6 3.5 - 5.0 g/dL    Globulin 3.1 2.4 - 3.5 gm/dL    Albumin/Globulin Ratio 1.2 1.1 - 2.0 ratio    Bilirubin Total 0.5 <=1.5 mg/dL    Alkaline Phosphatase 66 40 - 150 unit/L    Alanine Aminotransferase 9 0 - 55 unit/L    Aspartate Aminotransferase 15 5 - 34 unit/L    eGFR >60 mls/min/1.73/m2   CBC with Differential    Collection Time: 01/26/24  2:12 AM   Result Value Ref Range    WBC 8.65 4.50 - 11.50 x10(3)/mcL    RBC 4.88 4.70 - 6.10 x10(6)/mcL    Hgb 14.2 14.0 - 18.0 g/dL    Hct 40.0 (L) 42.0 - 52.0 %    MCV 82.0 80.0 - 94.0 fL    MCH 29.1 27.0 - 31.0 pg    MCHC 35.5 33.0 - 36.0 g/dL    RDW 13.1 11.5 - 17.0 %    Platelet 124 (L) 130 - 400 x10(3)/mcL    MPV  10.1 7.4 - 10.4 fL    Neut % 59.2 %    Lymph % 33.1 %    Mono % 6.8 %    Eos % 0.5 %    Basophil % 0.2 %    Lymph # 2.86 0.6 - 4.6 x10(3)/mcL    Neut # 5.12 2.1 - 9.2 x10(3)/mcL    Mono # 0.59 0.1 - 1.3 x10(3)/mcL    Eos # 0.04 0 - 0.9 x10(3)/mcL    Baso # 0.02 <=0.2 x10(3)/mcL    IG# 0.02 0 - 0.04 x10(3)/mcL    IG% 0.2 %    NRBC% 0.0 %    IPF 4.2 0.9 - 11.2 %     Significant Imaging:  Imaging Results              X-Ray Chest 1 View (Final result)  Result time 01/22/24 08:24:03      Final result by Lewis Malhotra MD (01/22/24 08:24:03)                   Impression:      No significant abnormalities      Electronically signed by: Lewis Malhotra  Date:    01/22/2024  Time:    08:24               Narrative:    EXAMINATION:  XR CHEST 1 VIEW    CPT 24933    CLINICAL HISTORY:  lateral to look at icd;    FINDINGS:  Only lateral projection is provided lung fields appear to be clear and free of gross infiltrates atelectasis or effusions.  ICD device is identified                                    EKG:      Telemetry: SB    Physical Exam  Vitals and nursing note reviewed.   Constitutional:       General: He is not in acute distress.     Appearance: Normal appearance. He is ill-appearing.   HENT:      Head: Normocephalic.      Mouth/Throat:      Mouth: Mucous membranes are moist.   Eyes:      Extraocular Movements: Extraocular movements intact.      Conjunctiva/sclera: Conjunctivae normal.   Cardiovascular:      Rate and Rhythm: Normal rate and regular rhythm.      Heart sounds: Normal heart sounds. No murmur heard.     Comments: PT unplugged Device  Pulmonary:      Effort: Pulmonary effort is normal. No respiratory distress.      Breath sounds: Normal breath sounds.   Abdominal:      Palpations: Abdomen is soft.   Musculoskeletal:         General: Normal range of motion.      Cervical back: Neck supple.      Right lower leg: No edema.      Left lower leg: No edema.   Skin:     General: Skin is warm and dry.       Comments: R Lateral Neck Active Fixation/Generator   Neurological:      General: No focal deficit present.      Mental Status: He is alert and oriented to person, place, and time. Mental status is at baseline.   Psychiatric:         Mood and Affect: Mood normal.         Behavior: Behavior normal.       Home Medications:   No current facility-administered medications on file prior to encounter.     Current Outpatient Medications on File Prior to Encounter   Medication Sig Dispense Refill    albuterol (PROVENTIL/VENTOLIN HFA) 90 mcg/actuation inhaler Inhale 1 puff into the lungs as needed.  5    BIKTARVY -25 mg per tablet Take 1 tablet by mouth once daily.  0    buprenorphine-naloxone (SUBOXONE) 8-2 mg Film Place 1 each under the tongue once daily.       busPIRone (BUSPAR) 30 MG Tab Take 1 tablet by mouth 2 (two) times daily.      lidocaine (LIDODERM) 5 % Place 1 patch onto the skin once daily.  0    LORazepam (ATIVAN) 0.5 MG tablet Take 1 tablet (0.5 mg total) by mouth 3 (three) times daily as needed (withdrawal symptoms). 12 tablet 0    thiamine 100 MG tablet Take 1 tablet (100 mg total) by mouth once daily.       Current Inpatient Medications:    Current Facility-Administered Medications:     albuterol-ipratropium 2.5 mg-0.5 mg/3 mL nebulizer solution 3 mL, 3 mL, Nebulization, Q6H PRN, Sushila Del Rio MD, 3 mL at 01/25/24 1521    ALPRAZolam tablet 0.25 mg, 0.25 mg, Oral, TID, Sushila Del Rio MD, 0.25 mg at 01/26/24 0814    vayizlluz-kwimjgbq-ymplpvw ala -25 mg (25 kg or greater) 1 tablet, 1 tablet, Oral, Daily, Raz Musa DO, 1 tablet at 01/26/24 1030    bismuth subsalicylate 262 mg/15 mL suspension 30 mL, 30 mL, Oral, Daily PRN, Pam Menjivar MD    buprenorphine HCL SL tablet 4 mg, 4 mg, Sublingual, BID, Pierre Vega MD, 4 mg at 01/26/24 1030    busPIRone tablet 30 mg, 30 mg, Oral, BID, Sushila Del Rio MD, 30 mg at 01/26/24 0814    calcium carbonate 200 mg calcium (500 mg) chewable  tablet 500 mg, 500 mg, Oral, Daily PRN, Pam Menjivar MD    enoxaparin injection 40 mg, 40 mg, Subcutaneous, Daily, Thad Interiano MD    ketorolac injection 30 mg, 30 mg, Intravenous, Q6H, Raz Musa DO, 30 mg at 01/25/24 2343    LIDOcaine 2000 mg in D5W 250 mL infusion, 0.5 mg/min, Intravenous, Continuous, Thad Interiano MD, Stopped at 01/24/24 0911    LIDOcaine 4 % cream, , Topical (Top), Once, Thad Interiano MD    magnesium sulfate 2g in water 50mL IVPB (premix), 2 g, Intravenous, PRN, Thad Interiano MD, Stopped at 01/25/24 0754    mupirocin 2 % ointment, , Nasal, BID, Cami Gallardo MD    potassium chloride 10 mEq in 100 mL IVPB, 20 mEq, Intravenous, Daily PRN, Thad Interiano MD, Last Rate: 100 mL/hr at 01/26/24 0635, Rate Verify at 01/26/24 0635    sodium chloride 0.9% flush 10 mL, 10 mL, Intravenous, PRN, Thad Interiano MD    sulfamethoxazole-trimethoprim 800-160mg per tablet 1 tablet, 1 tablet, Oral, BID, Virginia Jennings FNNADER, 1 tablet at 01/26/24 0900    [START ON 1/27/2024] sulfamethoxazole-trimethoprim 800-160mg per tablet 1 tablet, 1 tablet, Oral, Daily, Julian Sidhu MD    thiamine tablet 100 mg, 100 mg, Oral, Daily, Thad Interiano MD, 100 mg at 01/26/24 0815    traZODone tablet 100 mg, 100 mg, Oral, QHS, Pierre Vega MD, 100 mg at 01/25/24 2010    VTE Risk Mitigation (From admission, onward)           Ordered     enoxaparin injection 40 mg  Daily         01/21/24 2211     IP VTE HIGH RISK PATIENT  Once         01/21/24 2211                  Assessment:   VT Storm Requiring Defibrillation- Multiple Shocks since Admission s/p TVP Overdrive Pacing - PT still has Transient Episodes of NSVT    - s/p Active Fixation (1.22.24) - PT Disconnecting TVP    - Troponin Values Normal   Orthostatic Hypotension - Resolved   Prolonged Qtc - Improving   History of NICMO/EF 40-45% - Now Recovered with EF 55-60%    - Status Post Subcutaneous ICD    - Normal Coronaries in 2019  History of MI  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 PT states Understanding and Demands removal of Device Today. He also Refuses Implantation for Continuous Pacing Stating that he wants to Follow Up with his Primary Cardiologist at [facility] - Dr. Krzysztof Lowery  Will Plan for TVP/Active Fixation Removal Today  Polysubstance Abuse/Use Cessation Education Discussed with PT   Keep K > 4.0 and Mg > 2.0  Replete Lytes   Labs and EKG in AM: CBC, CMP and Mg    Shawn Montelongo, ANP  Cardiology  [facility]  01/26/2024     Physician addendum:  I have seen and examined this patient as a split-shared visit with the BAM d/t complicated medical management of above problems written in assessment and high acuity requiring physician expertise in medical decision-making. I performed the substantive portion of the history and exam. Above medical decision-making is also formulated by me.    Cardiovascular exam:  S1, S2  Lungs:  fine crackles at bases.  Extremities: no edema bilaterally    EKG reviewed QTC above 650  Plan:  Patient wants his temp pacemaker to be extracted.  Discussed the risk of VT.  Patient is adamant to get pacer out.  We will discontinue the pacemaker at this time.  Replace electrolytes aggressively.  Keep potassium above 4.0.  Today it is 3.2.  Patient wants to continue on his subcutaneous ICD device.  Recommend to keep magnesium above 2.0 potassium above 4.0.  We will sign off.      Ayad Kwong MD  Cardiologist

## 2024-01-26 NOTE — NURSING
Nurses Note -- 4 Eyes      1/26/2024   4:04 PM      Skin assessed during: Daily Assessment      [x] No Altered Skin Integrity Present    [x]Prevention Measures Documented      [] Yes- Altered Skin Integrity Present or Discovered   [] LDA Added if Not in Epic (Describe Wound)   [] New Altered Skin Integrity was Present on Admit and Documented in LDA   [] Wound Image Taken    Wound Care Consulted? No    Attending Nurse:  Love Orellana RN/Staff Member:   SCOTTY Colon

## 2024-01-26 NOTE — NURSING
Nurses Note -- 4 Eyes      1/25/2024   10:18 PM      Skin assessed during: Q Shift Change      [x] No Altered Skin Integrity Present    [x]Prevention Measures Documented      [] Yes- Altered Skin Integrity Present or Discovered   [] LDA Added if Not in Epic (Describe Wound)   [] New Altered Skin Integrity was Present on Admit and Documented in LDA   [] Wound Image Taken    Wound Care Consulted? No    Attending Nurse:  Donna Orellana RN/Staff Member:   SCOTTY Meléndez

## 2024-01-26 NOTE — PROGRESS NOTES
Inpatient Nutrition Evaluation    Admit Date: 1/21/2024   Total duration of encounter: 5 days   Patient Age: 33 y.o.    Nutrition Recommendation/Prescription     Continue heart healthy diet as tolerated  Continue thiamine supplementation  RD to monitor po intake and weight    Nutrition Assessment     Chart Review    Reason Seen: length of stay    Malnutrition Screening Tool Results   Have you recently lost weight without trying?: No  Have you been eating poorly because of a decreased appetite?: No   MST Score: 0   Diagnosis:  VT Storm Requiring Defibrillation- Multiple Shocks since Admission s/p TVP Overdrive Pacing - PT still has Transient Episodes of NSVT   -s/p active fixation 1/22  Prolonged Qtc- improving  History of NICMO/EF 40-45% - Now Recovered with EF 55-60%   Polysubstance Abuse - With Delirium Tremors   HIV    Relevant Medical History: IV drug use, cardiac arrest in 2207 with EF less than 20% s/p ICD, 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, right parieto-occipital ICH, endocarditis x 2, hep C, HIV    Scheduled Medications:  ALPRAZolam, 0.25 mg, TID  mauxbjdrl-kzqugkxq-ywqzxoq ala, 1 tablet, Daily  buprenorphine HCL, 4 mg, BID  busPIRone, 30 mg, BID  enoxaparin, 40 mg, Daily  LIDOcaine, , Once  mupirocin, , BID  sulfamethoxazole-trimethoprim 800-160mg, 1 tablet, BID  [START ON 1/27/2024] sulfamethoxazole-trimethoprim 800-160mg, 1 tablet, Daily  thiamine, 100 mg, Daily  traZODone, 100 mg, QHS    Continuous Infusions:  LIDOcaine, Last Rate: Stopped (01/24/24 0911)    PRN Medications: albuterol-ipratropium, bismuth subsalicylate, calcium carbonate, magnesium sulfate IVPB, potassium chloride, sodium chloride 0.9%    Recent Labs   Lab 01/21/24  1923 01/22/24  0119 01/22/24  0905 01/22/24  1834 01/23/24  0009 01/24/24  0007 01/24/24  2144 01/25/24  0424 01/26/24  0212 01/26/24  1031    139  --   --  142 141 143 141 139 140   K 4.1 3.8 3.9 4.1 4.1 3.5 3.1* 2.9* 3.2* 3.8   CALCIUM 9.9 8.8  --   --  " 9.3 8.7 8.3* 8.7 8.5 8.8   PHOS  --  2.4  --   --  2.9 3.7  --  2.8 3.4  --    MG 2.20 2.30 2.10 2.20 2.20 2.00  --  1.80 2.90*  --    CHLORIDE 103 107  --   --  113* 110* 110* 108* 109* 110*   CO2 21* 20*  --   --  23 21* 23 22 22 23   BUN 15.5 14.5  --   --  13.5 14.1 8.6* 7.4* 5.6* 5.4*   CREATININE 0.89 0.71*  --   --  0.82 0.82 0.86 0.84 0.89 0.90   EGFRNORACEVR >60 >60  --   --  >60 >60 >60 >60 >60 >60   GLUCOSE 105* 110*  --   --  147* 108* 97 92 89 88   BILITOT 0.6 0.4  --   --  0.6 0.5  --  0.5 0.5  --    ALKPHOS 91 79  --   --  72 65  --  64 66  --    ALT 8 7  --   --  6 7  --  8 9  --    AST 20 13  --   --  12 17  --  19 15  --    ALBUMIN 4.3 3.6  --   --  3.4* 3.2*  --  3.4* 3.6  --    WBC 8.96 8.60  --   --  11.35 8.60  --  8.84 8.65  --    HGB 14.9 13.4*  --   --  15.2 14.6  --  12.7* 14.2  --    HCT 42.3 38.5*  --   --  44.3 40.9*  --  36.5* 40.0*  --      Nutrition Orders:  Diet heart healthy      Appetite/Oral Intake: good/% of meals  Factors Affecting Nutritional Intake: none identified  Food/Evangelical/Cultural Preferences: unable to obtain  Food Allergies: no known food allergies  Last Bowel Movement: 24  Wound(s):     Altered Skin Integrity 23 0800 Left Thumb-Tissue loss description: Not applicable     Comments    : Pt in procedure at time of rounds. RN reports good appetite and no GI complaints. Last BM  noted. Per MST, no reports of decreased appetite or weight loss prior admission. Weight appears stable per EMR weights.     Anthropometrics    Height: 6' 2.02" (188 cm), Height Method: Stated  Last Weight: 70.3 kg (155 lb) (24 9243), Weight Method: Stated  BMI (Calculated): 19.9  BMI Classification: normal (BMI 18.5-24.9)        Ideal Body Weight (IBW), Male: 190.12 lb     % Ideal Body Weight, Male (lb): 81.53 %                 Usual Body Weight (UBW), k.1 kg  % Usual Body Weight: 103.46     Usual Weight Provided By: EMR weight history    Wt Readings from " Last 5 Encounters:   01/21/24 70.3 kg (155 lb)   01/21/24 70.3 kg (155 lb)   04/13/23 68.1 kg (150 lb 2.1 oz)   08/30/19 68.5 kg (151 lb 0.2 oz)     Weight Change(s) Since Admission:   Wt Readings from Last 1 Encounters:   01/21/24 2323 70.3 kg (155 lb)   Admit Weight: 70.3 kg (155 lb) (01/21/24 2323), Weight Method: Stated    Patient Education     Not applicable.    Nutrition Goals & Monitoring     Dietitian will monitor: food and beverage intake, weight, electrolyte/renal panel, glucose/endocrine profile, and gastrointestinal [id number]    Nutrition Risk/Follow-Up: low (follow-up in 5-7 days)  Patients assigned 'low nutrition risk' status do not qualify for a full nutritional assessment but will be monitored and re-evaluated in a 5-7 day time period. Please consult if re-evaluation needed sooner.

## 2024-01-26 NOTE — PROCEDURES
TEMPORARY PACEMAKER REMOVAL:    Procedures:  Removal of temporary pacemaker from right internal jugular access  Removal of a right internal jugular venous sheath    Description of the procedure:  The patient was brought to the cath lab  Under sterile technique, the temporary pacemaker wire was removed without difficulty  Fluoroscopy showed no complications  Right internal jugular venous sheath was pulled and pressure was held for 10 minutes  No bleeding  No hematoma  The patient was transferred back to his room    Anesthesia: None    Complications: None    Blood loss: None    Specimens removed:  None    Rhythm after removal of the pacemaker sinus rhythm without evidence of pauses or bradycardia    :  Dr. Rock Hargrove MD FACC FACP.  Interventional cardiology  [facility] [place]

## 2024-01-26 NOTE — PROGRESS NOTES
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 Patient recently incarcerated 2 days ago. Today he was found to be having seizure like activity in his cell and was brought to [facility] ED where he reportedly had 4 episodes of Vtach/vfib and was immediately cardioverted by his ICD. With subsequent shocks patient would apparently be awake  and alert but notably bradycardic. He was then transferred to [facility] due to the lack of cardiology services. Upon arrival to our ED patient again went into vtach and was shocked with return of normal circulation. Cardiology was consulted in the ED and they recommended he be started on Amiodarone. Additionally the ICD rep has been called out for interrogation of the device. Currently the patient denies any chest pain, shortness of breath, or nausea. However, he did experience these symptoms during the arthymmic events.     Hospital Course/Significant events:      24 Hour Interval History:  No acute events overnight. VSS. Afebrile. Good output. I/O past 24 544/2850ml. No acute complaints. Pleasant    Past Medical History:   Diagnosis Date    Cardiac arrest 08/2019    pulseless, AED required    Depression     Heart failure, type unknown     fr other facility medical record     Hepatitis C     HIV (human immunodeficiency virus infection)     HTN (hypertension)     IV drug user     Myocardial infarct, old     fr transfer medical records    Neuropathy     left foot    Overdose     heroine, meth    Pneumonia     Polysubstance abuse     heroin methadone    PTSD (post-traumatic stress disorder)     from prev medical record    Seizure 08/2019    POSSIBLE Seizure prior to cardiac arrest, unclear       Past Surgical History:   Procedure Laterality Date    CHEST TUBE INSERTION Bilateral     bilat when intubated in the past (removed)    CORONARY ANGIOGRAPHY N/A 8/30/2019    Procedure: ANGIOGRAM, CORONARY ARTERY;  Surgeon: Misbah Culp MD;  Location: [facility] CATH;  Service: Cardiology;  Laterality: N/A;    GASTROSTOMY TUBE PLACEMENT      fr other facility medical record    INSERTION OF PACEMAKER  2007    INSERTION, PACEMAKER, TEMPORARY TRANSVENOUS Right 1/22/2024    Procedure: Insertion, Pacemaker, Temporary Transvenous;  Surgeon: Thad Interiano MD;  Location: [facility] CATH LAB;  Service: Cardiology;  Laterality: Right;    LEFT HEART CATHETERIZATION Left 8/30/2019    Procedure: Left heart cath;  Surgeon: Misbah Culp MD;  Location: [facility] CATH;  Service: Cardiology;  Laterality: Left;    REMOVAL OF PACEMAKER  2014    TRACHEOSTOMY      x2 in the past (removed/healed as of August 2019)       Social History     Socioeconomic History    Marital status: Single   Tobacco Use    Smoking status: Every Day   Substance and Sexual Activity    Drug use: Yes     Types: IV, Methamphetamines     Comment: heroine opiates           Current Outpatient Medications   Medication Instructions    albuterol (PROVENTIL/VENTOLIN HFA) 90 mcg/actuation inhaler 1 puff, Inhalation, As needed (PRN)    BIKTARVY -25 mg per tablet 1 tablet, Oral, Daily    buprenorphine-naloxone (SUBOXONE) 8-2 mg Film 1 each, Sublingual, Daily    busPIRone (BUSPAR) 30 MG Tab 1 tablet, Oral, 2 times daily    lidocaine (LIDODERM) 5 % 1 patch,  Transdermal, Daily    LORazepam (ATIVAN) 0.5 mg, Oral, 3 times daily PRN    thiamine 100 mg, Oral, Daily       Current Inpatient Medications   ALPRAZolam  0.25 mg Oral TID    buprenorphine HCL  4 mg Sublingual BID    busPIRone  30 mg Oral BID    dolutegravir  50 mg Oral Daily    emtricitabine-tenofovir alafen  1 tablet Oral Daily    enoxaparin  40 mg Subcutaneous Daily    ketorolac  30 mg Intravenous Q6H    LIDOcaine   Topical (Top) Once    mupirocin   Nasal BID    sulfamethoxazole-trimethoprim 800-160mg  1 tablet Oral BID    [START ON 1/27/2024] sulfamethoxazole-trimethoprim 800-160mg  1 tablet Oral Daily    thiamine  100 mg Oral Daily    traZODone  100 mg Oral QHS       Current Intravenous Infusions   LIDOcaine Stopped (01/24/24 0911)         Review of Systems   Constitutional:  Negative for chills and fever.   Eyes:  Negative for double vision.   Respiratory:  Negative for cough and shortness of breath.    Cardiovascular:  Negative for chest pain, palpitations and orthopnea.   Gastrointestinal:  Negative for abdominal pain, diarrhea, nausea and vomiting.   Musculoskeletal:  Negative for back pain, myalgias and neck pain.   Neurological:  Negative for dizziness and headaches.          Objective:       Intake/Output Summary (Last 24 hours) at 1/26/2024 0627  Last data filed at 1/26/2024 0200  Gross per 24 hour   Intake 544.05 ml   Output 2850 ml   Net -2305.95 ml           Vital Signs (Most Recent):  Temp: 98.6 °F (37 °C) (01/26/24 0400)  Pulse: 80 (01/26/24 0600)  Resp: 18 (01/26/24 0600)  BP: 108/68 (01/26/24 0400)  SpO2: (!) 94 % (01/26/24 0400)  Body mass index is 19.89 kg/m².  Weight: 70.3 kg (155 lb) Vital Signs (24h Range):  Temp:  [98.2 °F (36.8 °C)-98.6 °F (37 °C)] 98.6 °F (37 °C)  Pulse:  [70-85] 80  Resp:  [10-25] 18  SpO2:  [94 %-100 %] 94 %  BP: ()/(68-81) 108/68  Arterial Line BP: (105-156)/(42-87) 123/57     Physical Exam  Vitals and nursing note reviewed.   Constitutional:       General: He is  not in acute distress.     Appearance: He is normal weight. He is not ill-appearing or toxic-appearing.   HENT:      Head: Normocephalic and atraumatic.      Mouth/Throat:      Mouth: Mucous membranes are moist.      Pharynx: Oropharynx is clear.   Eyes:      Extraocular Movements: Extraocular movements intact.      Conjunctiva/sclera: Conjunctivae normal.      Pupils: Pupils are equal, round, and reactive to light.   Cardiovascular:      Rate and Rhythm: Normal rate and regular rhythm.      Heart sounds: No murmur heard.  Pulmonary:      Effort: Pulmonary effort is normal. No respiratory distress.      Breath sounds: No wheezing or rhonchi.   Chest:      Comments: Bilateral scars present. Well healed.  Abdominal:      General: A surgical scar is present.   Musculoskeletal:         General: No swelling.      Right hand: Normal.      Right lower leg: No edema.      Left lower leg: No edema.      Comments: Left thumb with some erythema. No swelling, drainage or bleeding.   Skin:     General: Skin is warm and dry.   Neurological:      General: No focal deficit present.      Mental Status: He is alert and oriented to person, place, and time.           Lines/Drains/Airways       Arterial Line  Duration             Arterial Line 01/22/24 0101 Left Radial 4 days              Peripheral Intravenous Line  Duration                  Peripheral IV - Single Lumen 01/22/24 0345 20 G Anterior;Left Forearm 4 days         Sheath 01/22/24 1108 Right 3 days         Peripheral IV - Single Lumen 01/25/24 0700 20 G Anterior;Right Forearm <1 day                    Significant Labs:    Lab Results   Component Value Date    WBC 8.65 01/26/2024    HGB 14.2 01/26/2024    HCT 40.0 (L) 01/26/2024    MCV 82.0 01/26/2024     (L) 01/26/2024           BMP  Lab Results   Component Value Date     01/26/2024    K 3.2 (L) 01/26/2024    CHLORIDE 109 (H) 01/26/2024    CO2 22 01/26/2024    BUN 5.6 (L) 01/26/2024    CREATININE 0.89 01/26/2024  "   CALCIUM 8.5 01/26/2024    AGAP 10.0 01/24/2024    ESTGFRAFRICA >105 10/01/2021    EGFRNONAA >105 09/10/2021         ABG  No results for input(s): "PH", "PO2", "PCO2", "HCO3", "POCBASEDEF" in the last 168 hours.    Mechanical Ventilation Support:         Significant Imaging:  I have reviewed the pertinent imaging within the past 24 hours.        Assessment/Plan:     Assessment  VT storm  Prolonged QTc  Hx of MI 2007 with HFrEF s/p AICD  Hx of recurrent endocarditis with multiple AICD replacements (most recent 9/21)  Opioid use disorder (Heroin)  Thumb infection  Hx of Hep C, HIV/AIDS, R PCA mycotic aneurysm, R parieto-occipital ICH      Plan  1.Cardiology following. CIS had long discussion with patient pertaining to ICD placement. Patient agreeable to keep in place.  Continue to monitor closely.  2. Getting PRN potassium for K >4. Repeat BMP at 10  3. CD4 count 62.  Viral load 3000.  4. Continue Suboxone for withdrawals.      Patient now electing to be FULL status.   I have spoken with the patient at length about the patient's CODE STATUS. He, without coercion or bias, made a decision to make this decision.    [facility]    CODE STATUS: FULL    DVT Prophylaxis:Lovenox  GI Prophylaxis:famotidine     35 minutes of critical care was time spent personally by me on the following activities: development of treatment plan with patient or surrogate and bedside caregivers, discussions with consultants, evaluation of patient's response to treatment, examination of patient, ordering and performing treatments and interventions, ordering and review of laboratory studies, ordering and review of radiographic studies, pulse oximetry, re-evaluation of patient's condition.  This critical care time did not overlap with that of any other provider or involve time for any procedures.     Raz Musa,   Pulmonary Critical Care Medicine  [facility] - Emergency Dept  DOS: 01/26/2024    "

## 2024-01-27 VITALS
OXYGEN SATURATION: 97 % | HEART RATE: 79 BPM | RESPIRATION RATE: 15 BRPM | TEMPERATURE: 99 F | BODY MASS INDEX: 19.89 KG/M2 | WEIGHT: 155 LBS | HEIGHT: 74 IN | SYSTOLIC BLOOD PRESSURE: 115 MMHG | DIASTOLIC BLOOD PRESSURE: 79 MMHG

## 2024-01-27 LAB
BACTERIA BLD CULT: NORMAL
BACTERIA BLD CULT: NORMAL

## 2024-01-27 PROCEDURE — 25000003 PHARM REV CODE 250: Performed by: INTERNAL MEDICINE

## 2024-01-27 PROCEDURE — 93005 ELECTROCARDIOGRAM TRACING: CPT

## 2024-01-27 PROCEDURE — 25000003 PHARM REV CODE 250: Performed by: GENERAL PRACTICE

## 2024-01-27 PROCEDURE — 25000003 PHARM REV CODE 250

## 2024-01-27 PROCEDURE — 93010 ELECTROCARDIOGRAM REPORT: CPT | Mod: ,,, | Performed by: INTERNAL MEDICINE

## 2024-01-27 RX ORDER — SULFAMETHOXAZOLE AND TRIMETHOPRIM 800; 160 MG/1; MG/1
1 TABLET ORAL DAILY
Qty: 30 TABLET | Refills: 11 | Status: SHIPPED | OUTPATIENT
Start: 2024-01-28 | End: 2025-01-27

## 2024-01-27 RX ADMIN — BUPRENORPHINE 4 MG: 2 TABLET SUBLINGUAL at 10:01

## 2024-01-27 RX ADMIN — MUPIROCIN: 20 OINTMENT TOPICAL at 09:01

## 2024-01-27 RX ADMIN — ALPRAZOLAM 0.25 MG: 0.25 TABLET ORAL at 08:01

## 2024-01-27 RX ADMIN — THIAMINE HCL TAB 100 MG 100 MG: 100 TAB at 08:01

## 2024-01-27 RX ADMIN — BUSPIRONE HYDROCHLORIDE 30 MG: 15 TABLET ORAL at 08:01

## 2024-01-27 RX ADMIN — BICTEGRAVIR SODIUM, EMTRICITABINE, AND TENOFOVIR ALAFENAMIDE FUMARATE 1 TABLET: 50; 200; 25 TABLET ORAL at 10:01

## 2024-01-27 RX ADMIN — SULFAMETHOXAZOLE AND TRIMETHOPRIM 1 TABLET: 800; 160 TABLET ORAL at 08:01

## 2024-01-27 NOTE — NURSING
Nurses Note -- 4 Eyes      1/27/2024   11:43 AM      Skin assessed during: Daily Assessment      [x] No Altered Skin Integrity Present    [x]Prevention Measures Documented      [] Yes- Altered Skin Integrity Present or Discovered   [] LDA Added if Not in Epic (Describe Wound)   [] New Altered Skin Integrity was Present on Admit and Documented in LDA   [] Wound Image Taken    Wound Care Consulted? No    Attending Nurse:  Christy Orellana RN/Staff Member:   SCOTYT Schwartz

## 2024-01-27 NOTE — DISCHARGE SUMMARY
[facility]  Hospital Medicine Discharge Summary    Admit Date: 1/21/2024  Discharge Date and Time: 1/27/20242:45 PM  Admitting Physician:  Team  Discharging Physician: Jose Patel MD.  Primary Care Physician: Kike Muller MD  Consults: Cardiology, Hospital Medicine, and Pulmonary/Intensive care    Discharge Diagnoses:  VT storm requiring defibrillation s/p TVP   Prolonged QTC   Right thumb cellulitis   HIV  IV drug use  Essential hypertension  NICMO  Cardiac arrest/ICD   Endocarditis   ICH   Hepatitis-C  Seizure disorder  PTSD  Depression       Hospital Course:   Chief Complaint: Chest Pain (Transfer from [facility] with runs of V-fib,V-tach w/ defib shocks)         Patient information was obtained from patient, patient's family, past medical records and ER records.      HISTORY OF PRESENT ILLNESS:   Britton Perez is a 33 y.o. male with a past medical history of essential hypertension, NICMO, cardiac arrest/ICD, prolonged QT syndrome, endocarditis, IVDU, ICH, hepatitis-C, HIV, seizure disorder, PTSD, and depression presented to [facility] on 1/21/2024 transferred from [facility] for higher level of care.  Patient had reported seizure-like activity while in [facility] on day of presentation.  Patient reported he was detoxing from heroin.  Patient was taken to [facility] ED with reportedly 4 episodes of V-tach/VFib and was cardioverted by his ICD.  Post cardioversion patient was bradycardic in the 40s.  Patient was transferred to [facility] ED for cardiology services.  Upon arrival to [facility] ED went into V-tach again and was shocked with return of normal circulation.  Cardiology was consulted with recommendations of amniodarone. AICD was interrogated and noted to be functional.  Patient was admitted to ICU for close monitoring.  Patient elected DNR status and requested hospice. Patient was started lidocaine infusion in addition to Levaquin and Vancomycin for right thumb cellulitis. TVP was placed on 1/22.  Patient's CD4 count was 62  and infectious disease was consulted.  Antibiotics were transitioned to Bactrim BID x7 days with decreased to 1 tab daily for OI prophylaxis. Patient was started on Suboxone for withdrawals. Biktarvy was not available and patient did not have his own, therefore Descovy and Tivicay were started.  Patient revoked DNR status on 01/25.  Cardiology had long discussion with patient and patient elected for temporary pacemaker to be extracted and declined implantation for continuous pacing. Patient was cleared for downgrade out of ICU on 01/26/2023. Hospital medicine team was consulted for transition of care and further medical management. On exam patient denied complaints.     Plan:  TVP removed 1/226/24  Patient refused implantation for continuous pacing   Continue Suboxone for withdrawals   Continue Bactrim for OI prophylaxis- patient refused on discharge   Continue Descovy and Tivicay  F/up with his [facility] cardiology and pcp out pt    Pt was seen and examined on the day of discharge  Vitals:  VITAL SIGNS: 24 HRS MIN & MAX LAST   Temp  Min: 98 °F (36.7 °C)  Max: 98.5 °F (36.9 °C) 98.5 °F (36.9 °C)   BP  Min: 113/74  Max: 126/66 115/79   Pulse  Min: 57  Max: 103  79   Resp  Min: 12  Max: 24 15   SpO2  Min: 94 %  Max: 97 % 97 %       Physical Exam:    General appearance: Male in no apparent distress.  HEENNT: Atraumatic head.   Lungs: Clear to auscultation bilaterally.   Heart: Regular rate and rhythm.    Abdomen: Soft, non-distended, non-tender. Bowel sounds are normal.   Extremities:. No deformities.   Neuro: Awake, alert, and oriented. Motor and sensory exams grossly intact.   Psych/mental status: Appropriate mood and affect. Responds appropriately to questions.        Procedures Performed: No admission procedures for hospital encounter.     Significant Diagnostic Studies: See Full reports for all details    Recent Labs   Lab 01/24/24  0007 01/25/24  0424 01/26/24  0212   WBC 8.60 8.84 8.65    RBC 5.05 4.40* 4.88   HGB 14.6 12.7* 14.2   HCT 40.9* 36.5* 40.0*   MCV 81.0 83.0 82.0   MCH 28.9 28.9 29.1   MCHC 35.7 34.8 35.5   RDW 12.9 12.8 13.1    127* 124*   MPV 10.6* 10.5* 10.1       Recent Labs   Lab 01/24/24  0007 01/24/24  2144 01/25/24  0424 01/26/24  0212 01/26/24  1031      < > 141 139 140   K 3.5   < > 2.9* 3.2* 3.8   CO2 21*   < > 22 22 23   BUN 14.1   < > 7.4* 5.6* 5.4*   CREATININE 0.82   < > 0.84 0.89 0.90   CALCIUM 8.7   < > 8.7 8.5 8.8   MG 2.00  --  1.80 2.90*  --    ALBUMIN 3.2*  --  3.4* 3.6  --    ALKPHOS 65  --  64 66  --    ALT 7  --  8 9  --    AST 17  --  19 15  --    BILITOT 0.5  --  0.5 0.5  --     < > = values in this interval not displayed.        Microbiology Results (last 7 days)       Procedure Component Value Units Date/Time    Blood Culture [[id number]]  (Normal) Collected: 01/22/24 0914    Order Status: Completed Specimen: Arterial Blood Line Updated: 01/27/24 1100     CULTURE, BLOOD ([facility]) No Growth at 5 days    Blood Culture [[id number]]  (Normal) Collected: 01/22/24 1834    Order Status: Completed Specimen: Blood from Arm, Right Updated: 01/26/24 2100     CULTURE, BLOOD ([facility]) No Growth At 96 Hours             Electrophysiology Procedure  Table formatting from the original result was not included.  Images from the original result were not included.      Successful removal of temporary pacemaker         TEMPORARY PACEMAKER REMOVAL:     Procedures:  Removal of temporary pacemaker from right internal jugular   access  Removal of a right internal jugular venous sheath     Description of the procedure:  The patient was brought to the cath lab  Under sterile technique, the temporary pacemaker wire was removed without   difficulty  Fluoroscopy showed no complications  Right internal jugular venous sheath was pulled and pressure was held for   10 minutes  No bleeding  No hematoma  The patient was transferred back to his room     Anesthesia: None     Complications:  None     Blood loss: None     Specimens removed:  None     Rhythm after removal of the pacemaker sinus rhythm without evidence of   pauses or bradycardia     :  Dr. Rock Hargrove MD FAC FACP.  Interventional cardiology  [facility]        I certify that I was present for the critical steps of the procedure   including the diagnostic, surgical and/or interventional portions.     Procedure Log documented by No documenter listed and verified by Rock Hargrove MD.    Date: 1/26/2024  Time: 7:10 PM         Medication List        START taking these medications      sulfamethoxazole-trimethoprim 800-160mg 800-160 mg Tab  Commonly known as: BACTRIM DS  Take 1 tablet by mouth once daily.  Start taking on: January 28, 2024            CONTINUE taking these medications      albuterol 90 mcg/actuation inhaler  Commonly known as: PROVENTIL/VENTOLIN HFA     BIKTARVY -25 mg (25 kg or greater)  Generic drug: nulwrydeh-mpkklxql-qlofoow ala     buprenorphine-naloxone 8-2 mg 8-2 mg  Commonly known as: SUBOXONE     busPIRone 30 MG Tab  Commonly known as: BUSPAR     LIDOcaine 5 %  Commonly known as: LIDODERM     LORazepam 0.5 MG tablet  Commonly known as: ATIVAN  Take 1 tablet (0.5 mg total) by mouth 3 (three) times daily as needed (withdrawal symptoms).     thiamine 100 MG tablet  Take 1 tablet (100 mg total) by mouth once daily.               Where to Get Your Medications        You can get these medications from any pharmacy    Bring a paper prescription for each of these medications  sulfamethoxazole-trimethoprim 800-160mg 800-160 mg Tab          Explained in detail to the patient about the discharge plan, medications, and follow-up visits. Pt understands and agrees with the treatment plan  Discharge Disposition: Home or Self Care   Discharged Condition: stable  Diet-    Medications Per DC med rec  Activities as tolerated   Follow-up Information       Yohana  Kike BRAVO MD Follow up.    Specialty: Psychiatry  Why: As needed  Contact information:  [address] [facility] [place] [address]  [phone]                           For further questions contact hospitalist office    Discharge time 33 minutes    For worsening symptoms, chest pain, shortness of breath, increased abdominal pain, high grade fever, stroke or stroke like symptoms, immediately go to the nearest Emergency Room or call 911 as soon as possible.      Jose Conte M.D on 1/27/2024. at 2:45 PM.

## 2024-01-31 ENCOUNTER — PATIENT OUTREACH (OUTPATIENT)
Dept: ADMINISTRATIVE | Facility: CLINIC | Age: 34
End: 2024-01-31
Payer: MEDICAID

## 2024-08-31 ENCOUNTER — HOSPITAL ENCOUNTER (INPATIENT)
Facility: HOSPITAL | Age: 34
LOS: 2 days | Discharge: OTHER INSTITUTION | DRG: 309 | End: 2024-09-02
Attending: EMERGENCY MEDICINE | Admitting: INTERNAL MEDICINE
Payer: MEDICAID

## 2024-08-31 DIAGNOSIS — I47.20 VENTRICULAR TACHYCARDIA: ICD-10-CM

## 2024-08-31 DIAGNOSIS — B20 HIV INFECTION, UNSPECIFIED SYMPTOM STATUS: ICD-10-CM

## 2024-08-31 DIAGNOSIS — R00.1 BRADYCARDIA: ICD-10-CM

## 2024-08-31 DIAGNOSIS — Z45.02 AICD DISCHARGE: Primary | ICD-10-CM

## 2024-08-31 DIAGNOSIS — I42.9 CARDIOMYOPATHY: ICD-10-CM

## 2024-08-31 DIAGNOSIS — I49.9 DYSRHYTHMIA, CARDIAC: ICD-10-CM

## 2024-08-31 DIAGNOSIS — F11.10 HEROIN ABUSE: ICD-10-CM

## 2024-08-31 DIAGNOSIS — I49.9 ABNORMAL HEART RHYTHM: ICD-10-CM

## 2024-08-31 DIAGNOSIS — F13.10 BENZODIAZEPINE ABUSE: ICD-10-CM

## 2024-08-31 PROBLEM — F17.200 TOBACCO DEPENDENCY: Status: ACTIVE | Noted: 2024-08-31

## 2024-08-31 LAB
ALBUMIN SERPL-MCNC: 3.7 G/DL (ref 3.5–5)
ALBUMIN/GLOB SERPL: 1 RATIO (ref 1.1–2)
ALP SERPL-CCNC: 70 UNIT/L (ref 40–150)
ALT SERPL-CCNC: 8 UNIT/L (ref 0–55)
ANION GAP SERPL CALC-SCNC: 13 MEQ/L
AST SERPL-CCNC: 17 UNIT/L (ref 5–34)
BASOPHILS # BLD AUTO: 0.01 X10(3)/MCL
BASOPHILS NFR BLD AUTO: 0.1 %
BILIRUB SERPL-MCNC: 0.6 MG/DL
BNP BLD-MCNC: 45.8 PG/ML
BUN SERPL-MCNC: 12.4 MG/DL (ref 8.9–20.6)
CALCIUM SERPL-MCNC: 9.6 MG/DL (ref 8.4–10.2)
CHLORIDE SERPL-SCNC: 108 MMOL/L (ref 98–107)
CO2 SERPL-SCNC: 16 MMOL/L (ref 22–29)
CREAT SERPL-MCNC: 0.8 MG/DL (ref 0.73–1.18)
CREAT/UREA NIT SERPL: 16
EOSINOPHIL # BLD AUTO: 0.01 X10(3)/MCL (ref 0–0.9)
EOSINOPHIL NFR BLD AUTO: 0.1 %
ERYTHROCYTE [DISTWIDTH] IN BLOOD BY AUTOMATED COUNT: 12.7 % (ref 11.5–17)
GFR SERPLBLD CREATININE-BSD FMLA CKD-EPI: >60 ML/MIN/1.73/M2
GLOBULIN SER-MCNC: 3.8 GM/DL (ref 2.4–3.5)
GLUCOSE SERPL-MCNC: 102 MG/DL (ref 74–100)
HCT VFR BLD AUTO: 42.4 % (ref 42–52)
HGB BLD-MCNC: 14.8 G/DL (ref 14–18)
IMM GRANULOCYTES # BLD AUTO: 0.03 X10(3)/MCL (ref 0–0.04)
IMM GRANULOCYTES NFR BLD AUTO: 0.3 %
INR PPP: 1.1
LYMPHOCYTES # BLD AUTO: 1.59 X10(3)/MCL (ref 0.6–4.6)
LYMPHOCYTES NFR BLD AUTO: 17.2 %
MAGNESIUM SERPL-MCNC: 1.4 MG/DL (ref 1.6–2.6)
MCH RBC QN AUTO: 29.9 PG (ref 27–31)
MCHC RBC AUTO-ENTMCNC: 34.9 G/DL (ref 33–36)
MCV RBC AUTO: 85.7 FL (ref 80–94)
MONOCYTES # BLD AUTO: 0.36 X10(3)/MCL (ref 0.1–1.3)
MONOCYTES NFR BLD AUTO: 3.9 %
NEUTROPHILS # BLD AUTO: 7.23 X10(3)/MCL (ref 2.1–9.2)
NEUTROPHILS NFR BLD AUTO: 78.4 %
NRBC BLD AUTO-RTO: 0 %
OHS QRS DURATION: 88 MS
OHS QRS DURATION: 94 MS
OHS QRS DURATION: 94 MS
OHS QTC CALCULATION: 460 MS
OHS QTC CALCULATION: 520 MS
OHS QTC CALCULATION: 536 MS
PLATELET # BLD AUTO: 141 X10(3)/MCL (ref 130–400)
PMV BLD AUTO: 9.8 FL (ref 7.4–10.4)
POTASSIUM SERPL-SCNC: 4.2 MMOL/L (ref 3.5–5.1)
PROT SERPL-MCNC: 7.5 GM/DL (ref 6.4–8.3)
PROTHROMBIN TIME: 14.3 SECONDS (ref 12.5–14.5)
RBC # BLD AUTO: 4.95 X10(6)/MCL (ref 4.7–6.1)
SODIUM SERPL-SCNC: 137 MMOL/L (ref 136–145)
TROPONIN I SERPL-MCNC: <0.01 NG/ML (ref 0–0.04)
WBC # BLD AUTO: 9.23 X10(3)/MCL (ref 4.5–11.5)

## 2024-08-31 PROCEDURE — 63600175 PHARM REV CODE 636 W HCPCS: Performed by: EMERGENCY MEDICINE

## 2024-08-31 PROCEDURE — 99233 SBSQ HOSP IP/OBS HIGH 50: CPT | Mod: ,,, | Performed by: GENERAL PRACTICE

## 2024-08-31 PROCEDURE — 86361 T CELL ABSOLUTE COUNT: CPT

## 2024-08-31 PROCEDURE — 83880 ASSAY OF NATRIURETIC PEPTIDE: CPT | Performed by: EMERGENCY MEDICINE

## 2024-08-31 PROCEDURE — 63600175 PHARM REV CODE 636 W HCPCS: Performed by: INTERNAL MEDICINE

## 2024-08-31 PROCEDURE — 20000000 HC ICU ROOM

## 2024-08-31 PROCEDURE — 85610 PROTHROMBIN TIME: CPT | Performed by: EMERGENCY MEDICINE

## 2024-08-31 PROCEDURE — 36415 COLL VENOUS BLD VENIPUNCTURE: CPT

## 2024-08-31 PROCEDURE — 84484 ASSAY OF TROPONIN QUANT: CPT | Performed by: EMERGENCY MEDICINE

## 2024-08-31 PROCEDURE — 93010 ELECTROCARDIOGRAM REPORT: CPT | Mod: ,,, | Performed by: INTERNAL MEDICINE

## 2024-08-31 PROCEDURE — 87536 HIV-1 QUANT&REVRSE TRNSCRPJ: CPT

## 2024-08-31 PROCEDURE — A4216 STERILE WATER/SALINE, 10 ML: HCPCS | Performed by: INTERNAL MEDICINE

## 2024-08-31 PROCEDURE — 36415 COLL VENOUS BLD VENIPUNCTURE: CPT | Performed by: GENERAL PRACTICE

## 2024-08-31 PROCEDURE — 99291 CRITICAL CARE FIRST HOUR: CPT

## 2024-08-31 PROCEDURE — 63600175 PHARM REV CODE 636 W HCPCS

## 2024-08-31 PROCEDURE — 96374 THER/PROPH/DIAG INJ IV PUSH: CPT

## 2024-08-31 PROCEDURE — 93005 ELECTROCARDIOGRAM TRACING: CPT

## 2024-08-31 PROCEDURE — 83735 ASSAY OF MAGNESIUM: CPT | Performed by: EMERGENCY MEDICINE

## 2024-08-31 PROCEDURE — C1751 CATH, INF, PER/CENT/MIDLINE: HCPCS

## 2024-08-31 PROCEDURE — 80053 COMPREHEN METABOLIC PANEL: CPT | Performed by: EMERGENCY MEDICINE

## 2024-08-31 PROCEDURE — 25000003 PHARM REV CODE 250

## 2024-08-31 PROCEDURE — 36569 INSJ PICC 5 YR+ W/O IMAGING: CPT

## 2024-08-31 PROCEDURE — 85025 COMPLETE CBC W/AUTO DIFF WBC: CPT | Performed by: EMERGENCY MEDICINE

## 2024-08-31 PROCEDURE — 25000003 PHARM REV CODE 250: Performed by: INTERNAL MEDICINE

## 2024-08-31 PROCEDURE — 02HV33Z INSERTION OF INFUSION DEVICE INTO SUPERIOR VENA CAVA, PERCUTANEOUS APPROACH: ICD-10-PCS | Performed by: INTERNAL MEDICINE

## 2024-08-31 RX ORDER — SODIUM CHLORIDE 0.9 % (FLUSH) 0.9 %
10 SYRINGE (ML) INJECTION
Status: DISCONTINUED | OUTPATIENT
Start: 2024-08-31 | End: 2024-09-02 | Stop reason: HOSPADM

## 2024-08-31 RX ORDER — ONDANSETRON HYDROCHLORIDE 2 MG/ML
4 INJECTION, SOLUTION INTRAVENOUS EVERY 6 HOURS PRN
Status: DISCONTINUED | OUTPATIENT
Start: 2024-08-31 | End: 2024-09-02 | Stop reason: HOSPADM

## 2024-08-31 RX ORDER — ALPRAZOLAM 0.25 MG/1
0.25 TABLET ORAL 3 TIMES DAILY PRN
Status: DISCONTINUED | OUTPATIENT
Start: 2024-08-31 | End: 2024-09-02

## 2024-08-31 RX ORDER — LIDOCAINE HYDROCHLORIDE ANHYDROUS AND DEXTROSE MONOHYDRATE .8; 5 G/100ML; G/100ML
2 INJECTION, SOLUTION INTRAVENOUS CONTINUOUS
Status: DISCONTINUED | OUTPATIENT
Start: 2024-08-31 | End: 2024-09-02

## 2024-08-31 RX ORDER — SODIUM CHLORIDE 0.9 % (FLUSH) 0.9 %
10 SYRINGE (ML) INJECTION EVERY 6 HOURS
Status: DISCONTINUED | OUTPATIENT
Start: 2024-08-31 | End: 2024-09-02 | Stop reason: HOSPADM

## 2024-08-31 RX ORDER — ATROPINE SULFATE 1 MG/ML
1 INJECTION, SOLUTION INTRAMUSCULAR; INTRAVENOUS; SUBCUTANEOUS
Status: DISCONTINUED | OUTPATIENT
Start: 2024-08-31 | End: 2024-08-31

## 2024-08-31 RX ORDER — METOCLOPRAMIDE HYDROCHLORIDE 5 MG/ML
5 INJECTION INTRAMUSCULAR; INTRAVENOUS
Status: COMPLETED | OUTPATIENT
Start: 2024-08-31 | End: 2024-08-31

## 2024-08-31 RX ORDER — ATROPINE SULFATE 0.1 MG/ML
INJECTION INTRAVENOUS
Status: COMPLETED
Start: 2024-08-31 | End: 2024-08-31

## 2024-08-31 RX ORDER — MAGNESIUM SULFATE HEPTAHYDRATE 40 MG/ML
2 INJECTION, SOLUTION INTRAVENOUS ONCE
Status: COMPLETED | OUTPATIENT
Start: 2024-08-31 | End: 2024-08-31

## 2024-08-31 RX ORDER — LORAZEPAM 0.5 MG/1
0.5 TABLET ORAL ONCE
Status: COMPLETED | OUTPATIENT
Start: 2024-08-31 | End: 2024-08-31

## 2024-08-31 RX ORDER — BUPRENORPHINE HYDROCHLORIDE 8 MG/1
8 TABLET SUBLINGUAL DAILY
Status: DISCONTINUED | OUTPATIENT
Start: 2024-08-31 | End: 2024-09-02

## 2024-08-31 RX ORDER — ATROPINE SULFATE 0.1 MG/ML
INJECTION INTRAVENOUS
Status: DISPENSED
Start: 2024-08-31 | End: 2024-08-31

## 2024-08-31 RX ORDER — CHLORDIAZEPOXIDE HYDROCHLORIDE 25 MG/1
25 CAPSULE, GELATIN COATED ORAL
Status: DISCONTINUED | OUTPATIENT
Start: 2024-08-31 | End: 2024-09-02 | Stop reason: HOSPADM

## 2024-08-31 RX ORDER — MUPIROCIN 20 MG/G
OINTMENT TOPICAL 2 TIMES DAILY
Status: DISCONTINUED | OUTPATIENT
Start: 2024-09-02 | End: 2024-09-02 | Stop reason: HOSPADM

## 2024-08-31 RX ORDER — GABAPENTIN 300 MG/1
300 CAPSULE ORAL ONCE
Status: COMPLETED | OUTPATIENT
Start: 2024-08-31 | End: 2024-08-31

## 2024-08-31 RX ORDER — ATROPINE SULFATE 0.1 MG/ML
1 INJECTION INTRAVENOUS
Status: COMPLETED | OUTPATIENT
Start: 2024-08-31 | End: 2024-08-31

## 2024-08-31 RX ADMIN — METOCLOPRAMIDE 5 MG: 5 INJECTION, SOLUTION INTRAMUSCULAR; INTRAVENOUS at 10:08

## 2024-08-31 RX ADMIN — ONDANSETRON 4 MG: 2 INJECTION INTRAMUSCULAR; INTRAVENOUS at 10:08

## 2024-08-31 RX ADMIN — Medication 10 ML: at 05:08

## 2024-08-31 RX ADMIN — ALPRAZOLAM 0.25 MG: 0.25 TABLET ORAL at 01:08

## 2024-08-31 RX ADMIN — GABAPENTIN 300 MG: 300 CAPSULE ORAL at 05:08

## 2024-08-31 RX ADMIN — ALPRAZOLAM 0.25 MG: 0.25 TABLET ORAL at 07:08

## 2024-08-31 RX ADMIN — LIDOCAINE HYDROCHLORIDE 2 MG/MIN: 8 INJECTION, SOLUTION INTRAVENOUS at 06:08

## 2024-08-31 RX ADMIN — ATROPINE SULFATE 1 MG: 0.1 INJECTION, SOLUTION ENDOTRACHEAL; INTRAMUSCULAR; INTRAVENOUS; SUBCUTANEOUS at 05:08

## 2024-08-31 RX ADMIN — BUPRENORPHINE 8 MG: 8 TABLET SUBLINGUAL at 10:08

## 2024-08-31 RX ADMIN — SODIUM CHLORIDE, PRESERVATIVE FREE 10 ML: 5 INJECTION INTRAVENOUS at 06:08

## 2024-08-31 RX ADMIN — CHLORDIAZEPOXIDE HYDROCHLORIDE 25 MG: 25 CAPSULE ORAL at 10:08

## 2024-08-31 RX ADMIN — ALPRAZOLAM 0.25 MG: 0.25 TABLET ORAL at 10:08

## 2024-08-31 RX ADMIN — MAGNESIUM SULFATE HEPTAHYDRATE 2 G: 40 INJECTION, SOLUTION INTRAVENOUS at 10:08

## 2024-08-31 RX ADMIN — LORAZEPAM 0.5 MG: 0.5 TABLET ORAL at 12:08

## 2024-08-31 RX ADMIN — ATROPINE SULFATE 1 MG: 0.1 INJECTION INTRAVENOUS at 05:08

## 2024-08-31 NOTE — ED PROVIDER NOTES
"Encounter Date: 8/31/2024       History     Chief Complaint   Patient presents with    AICD Problem     Currently incarcerated at [facility] since Thursday. Last Heroin use on Wednesday. Also detoxing Xanax (3 bars a day) States defibrillator  (rollApp) "fired" x 2. Hx this happening in past with detox of Heroin. +Vomiting. No IV. +hx HIV, CHF     34-year-old male with history of prolonged QT syndrome, VT, NICMO, status post AICD, bradycardia, refused pacemaker placement, IV drug use with endocarditis, presenting from [facility] for evaluation.  He states his defibrillator went off twice.  He adds that he is detoxing from both heroin and benzodiazepines.  Last use was 3 days ago.  He has had similar episodes in the past with drug withdrawal.  He denies any chest pain or shortness of breath, no lightheadedness or syncope, however he is reporting nausea and dry heaving in the room.  No fever.    The history is provided by the patient and medical records.     Review of patient's allergies indicates:   Allergen Reactions    Penicillins      Past Medical History:   Diagnosis Date    Cardiac arrest 08/2019    pulseless, AED required    Depression     Heart failure, type unknown     fr other facility medical record    Hepatitis C     HIV (human immunodeficiency virus infection)     HTN (hypertension)     IV drug user     Myocardial infarct, old     fr transfer medical records    Neuropathy     left foot    Overdose     heroine, meth    Pneumonia     Polysubstance abuse     heroin methadone    PTSD (post-traumatic stress disorder)     from prev medical record    Seizure 08/2019    POSSIBLE Seizure prior to cardiac arrest, unclear     Past Surgical History:   Procedure Laterality Date    CHEST TUBE INSERTION Bilateral     bilat when intubated in the past (removed)    CORONARY ANGIOGRAPHY N/A 8/30/2019    Procedure: ANGIOGRAM, CORONARY ARTERY;  Surgeon: Misbah Culp MD;  Location: [facility] CATH;  Service: Cardiology;  " Laterality: N/A;    GASTROSTOMY TUBE PLACEMENT      fr other facility medical record    INSERTION OF PACEMAKER  2007    INSERTION, PACEMAKER, TEMPORARY TRANSVENOUS Right 1/22/2024    Procedure: Insertion, Pacemaker, Temporary Transvenous;  Surgeon: Thad Interiano MD;  Location: [facility] CATH LAB;  Service: Cardiology;  Laterality: Right;    LEFT HEART CATHETERIZATION Left 8/30/2019    Procedure: Left heart cath;  Surgeon: Misbah Culp MD;  Location: [facility] CATH;  Service: Cardiology;  Laterality: Left;    REMOVAL OF PACEMAKER  2014    REMOVAL OF PACEMAKER N/A 1/26/2024    Procedure: Removal Cardiac Pacemaker;  Surgeon: Rock Hargrove MD;  Location: [facility] CATH LAB;  Service: Cardiology;  Laterality: N/A;  removal of TVP    TRACHEOSTOMY      x2 in the past (removed/healed as of August 2019)     No family history on file.  Social History     Tobacco Use    Smoking status: Every Day   Substance Use Topics    Drug use: Yes     Types: IV, Methamphetamines     Comment: heroine opiates     Review of Systems   Constitutional:  Positive for chills. Negative for fever.   HENT:  Negative for sore throat.    Respiratory:  Negative for shortness of breath.    Cardiovascular:  Negative for chest pain and leg swelling.   Gastrointestinal:  Positive for nausea and vomiting. Negative for abdominal pain.   Genitourinary:  Negative for difficulty urinating.   Musculoskeletal:  Positive for myalgias.   Skin:  Negative for rash.   Neurological:  Negative for light-headedness and headaches.       Physical Exam     Initial Vitals [08/31/24 0328]   BP Pulse Resp Temp SpO2   113/73 65 19 98.4 °F (36.9 °C) 97 %      MAP       --         Physical Exam    Nursing note and vitals reviewed.  Constitutional: He is not diaphoretic. No distress.   Thin male, ill-appearing, dry heaving in the room   HENT:   Head: Normocephalic and atraumatic.   Lips dry   Eyes: Conjunctivae and EOM are normal.   Neck: Neck supple.   Normal range of  motion.  Cardiovascular:  Regular rhythm and intact distal pulses.           Bradycardia, regular rhythm   Pulmonary/Chest: Breath sounds normal. No respiratory distress. He has no wheezes. He has no rhonchi. He has no rales.   Abdominal: Abdomen is soft. Bowel sounds are normal. He exhibits no distension. There is no abdominal tenderness.   Musculoskeletal:         General: No edema. Normal range of motion.      Cervical back: Normal range of motion and neck supple.     Neurological: He is alert and oriented to person, place, and time. He has normal strength. GCS score is 15. GCS eye subscore is 4. GCS verbal subscore is 5. GCS motor subscore is 6.   Skin: Skin is warm and dry. Capillary refill takes less than 2 seconds. No rash noted. No pallor.   Psychiatric: He has a normal mood and affect.         ED Course   Critical Care    Date/Time: 8/31/2024 6:31 AM    Performed by: Irma Dent MD  Authorized by: Irma Dent MD  Direct patient critical care time: 10 minutes  Additional history critical care time: 10 minutes  Ordering / reviewing critical care time: 5 minutes  Documentation critical care time: 7 minutes  Consulting other physicians critical care time: 7 minutes  Consult with family critical care time: 5 minutes  Total critical care time (exclusive of procedural time) : 44 minutes  Critical care time was exclusive of separately billable procedures and treating other patients.  Critical care was necessary to treat or prevent imminent or life-threatening deterioration of the following conditions: cardiac failure.  Critical care was time spent personally by me on the following activities: development of treatment plan with patient or surrogate, discussions with consultants, interpretation of cardiac output measurements, evaluation of patient's response to treatment, examination of patient, obtaining history from patient or surrogate, ordering and performing treatments and interventions, ordering and  review of laboratory studies, pulse oximetry, ordering and review of radiographic studies, re-evaluation of patient's condition and review of old charts.        Labs Reviewed   COMPREHENSIVE METABOLIC PANEL - Abnormal       Result Value    Sodium 137      Potassium 4.2      Chloride 108 (*)     CO2 16 (*)     Glucose 102 (*)     Blood Urea Nitrogen 12.4      Creatinine 0.80      Calcium 9.6      Protein Total 7.5      Albumin 3.7      Globulin 3.8 (*)     Albumin/Globulin Ratio 1.0 (*)     Bilirubin Total 0.6      ALP 70      ALT 8      AST 17      eGFR >60      Anion Gap 13.0      BUN/Creatinine Ratio 16     MAGNESIUM - Abnormal    Magnesium Level 1.40 (*)    B-TYPE NATRIURETIC PEPTIDE - Normal    Natriuretic Peptide 45.8     TROPONIN I - Normal    Troponin-I <0.010     PROTIME-INR - Normal    PT 14.3      INR 1.1     CBC W/ AUTO DIFFERENTIAL    Narrative:     The following orders were created for panel order CBC auto differential.  Procedure                               Abnormality         Status                     ---------                               -----------         ------                     CBC with Differential[[id number]]                           Final result                 Please view results for these tests on the individual orders.   CBC WITH DIFFERENTIAL    WBC 9.23      RBC 4.95      Hgb 14.8      Hct 42.4      MCV 85.7      MCH 29.9      MCHC 34.9      RDW 12.7      Platelet 141      MPV 9.8      Neut % 78.4      Lymph % 17.2      Mono % 3.9      Eos % 0.1      Basophil % 0.1      Lymph # 1.59      Neut # 7.23      Mono # 0.36      Eos # 0.01      Baso # 0.01      IG# 0.03      IG% 0.3      NRBC% 0.0     CD4 LYMPHOCYTES (OLG)   HIV RNA, QUANTITATIVE, PCR   PNEUMOCYSTIS JIROVEII BY PCR   HISTOPLASMA/BLASTOMYCES AG, URINE     EKG Readings: (Independently Interpreted)   Initial Reading: No STEMI. Rhythm: Sinus Bradycardia. Heart Rate: 41.   0340       ECG Results              EKG 12-lead (Final  result)        Collection Time Result Time QRS Duration [facility] QTC Calculation    08/31/24 05:39:53 08/31/24 12:28:43 88 520                     Final result by Interface, Lab In [facility] (08/31/24 12:28:50)                   Narrative:    Test Reason : I49.9,    Vent. Rate : 105 BPM     Atrial Rate : 105 BPM     P-R Int : 132 ms          QRS Dur : 088 ms      QT Int : 394 ms       P-R-T Axes : 082 087 079 degrees     QTc Int : 520 ms    Sinus tachycardia  Biatrial enlargement  Prolonged QT  Abnormal ECG    Confirmed by Pancho Self MD ([id number]) on 8/31/2024 12:28:39 PM    Referred By: [email or web address]   SELF           Confirmed By:Pancho Self MD                                     EKG 12-lead (Final result)        Collection Time Result Time QRS Duration [facility] QTC Calculation    08/31/24 03:40:10 08/31/24 06:23:08 94 460                     Final result by Interface, Lab In [facility] (08/31/24 06:23:13)                   Narrative:    Test Reason : R07.9,    Vent. Rate : 041 BPM     Atrial Rate : 041 BPM     P-R Int : 112 ms          QRS Dur : 094 ms      QT Int : 558 ms       P-R-T Axes : 007 087 071 degrees     QTc Int : 460 ms    Marked sinus bradycardia  Abnormal ECG    Confirmed by Jero Dove MD ([id number]) on 8/31/2024 6:23:03 AM    Referred By:             Confirmed By:Jero oDve MD                                  Imaging Results              X-Ray Chest 1 View (Final result)  Result time 08/31/24 09:21:22      Final result by Julien Gil MD (08/31/24 09:21:22)                   Impression:      1. No evidence of lobar type consolidation or acute cardiac decompensation noted.      Electronically signed by: Julien Gil MD  Date:    08/31/2024  Time:    09:21               Narrative:    EXAMINATION:  XR CHEST 1 VIEW    CLINICAL HISTORY:  Chest pain.    TECHNIQUE:  1 view of the chest.    COMPARISON:  01/22/2024    FINDINGS:  The lungs demonstrate no evidence of lobar type consolidation, visible pneumothorax, or pleural  fluid.  AICD device present.  The cardiac silhouette is within normal limits for size.   No acute displaced fracture is seen.                                    X-Rays:   Independently Interpreted Readings:   Chest X-Ray: No infiltrates.     Medications   LIDOcaine 2000 mg in D5W 250 mL infusion (2 mg/min Intravenous New Bag 8/31/24 0611)   mupirocin 2 % ointment (has no administration in time range)   sodium chloride 0.9% flush 10 mL (has no administration in time range)   ylulpltdv-oirimqks-innwrej ala -25 mg (25 kg or greater) 1 tablet (1 tablet Oral Not Given 8/31/24 0900)   buprenorphine HCL SL tablet 8 mg (8 mg Sublingual Given 8/31/24 1053)   chlordiazepoxide capsule 25 mg (25 mg Oral Given 8/31/24 1053)   sodium chloride 0.9% flush 10 mL (has no administration in time range)     And   sodium chloride 0.9% flush 10 mL (has no administration in time range)   ondansetron injection 4 mg (4 mg Intravenous Given 8/31/24 1032)   sodium chloride 0.9% flush 10 mL (has no administration in time range)     And   sodium chloride 0.9% flush 10 mL (has no administration in time range)   atropine 0.1 mg/mL injection (has no administration in time range)   ALPRAZolam tablet 0.25 mg (has no administration in time range)   atropine injection 1 mg (1 mg Intravenous Given 8/31/24 0522)   metoclopramide injection 5 mg (5 mg Intravenous Given 8/31/24 1053)   magnesium sulfate 2g in water 50mL IVPB (premix) (2 g Intravenous New Bag 8/31/24 1032)   LORazepam tablet 0.5 mg (0.5 mg Oral Given 8/31/24 1200)     Medical Decision Making  34-year-old male with multiple comorbidities to include IV drug use, HIV/AIDS, NICMO, VT with AICD, bradycardia, prolonged QT syndrome and cardiac arrest presents for firing of his AICD x2.  Currently he is bradycardic, hemodynamically normal otherwise.  He is not complaining of chest discomfort or shortness of breath.  He is dry heaving with chills, attributing this to drug withdrawal.  Patient  "was markedly bradycardic in the 30s, therefore I did order a dose of atropine.  During its administration, patient had an episode of V-tach at which time his defibrillator shocked him.  After this he was in sinus tachycardia, confirmed with EKG.  He remains hemodynamically stable.  Considering his extensive history to include QT prolongation and bradycardia, I did consult with Cardiology regarding antiarrhythmics and admission.  I will start him on lidocaine and consult ICU for admission.  Labs thus far unremarkable, I did add a magnesium level.  Patient is amenable to admission.    Problems Addressed:  AICD discharge: acute illness or injury  Benzodiazepine abuse: chronic illness or injury  Bradycardia: acute illness or injury  Dysrhythmia, cardiac: acute illness or injury  Heroin abuse: chronic illness or injury  Ventricular tachycardia: acute illness or injury    Amount and/or Complexity of Data Reviewed  Labs: ordered.  Radiology: ordered.    Risk  Decision regarding hospitalization.               ED Course as of 08/31/24 1239   Sat Aug 31, 2024   0604 [facility] consulted, I spoke to Dr. Dove regarding the case. He agrees with Lidocaine, recommends ICU setting; still awaiting Mg level and interrogation of AICD. The representative is on the way.  ICU contacted and agreeable to admission.  We will continue to monitor for withdrawal syndrome.  Benzodiazepines and buprenorphine per ICU. [RB]      ED Course User Index  [RB] Irma Dent MD          /81 (BP Location: Right arm, Patient Position: Lying)   Pulse 97   Temp 98.1 °F (36.7 °C)   Resp (!) 29   Ht 6' 2" (1.88 m)   Wt 59 kg (130 lb)   SpO2 95%   BMI 16.69 kg/m²                    Clinical Impression:  Final diagnoses:  [Z45.02] AICD discharge (Primary)  [R00.1] Bradycardia  [F11.10] Heroin abuse  [F13.10] Benzodiazepine abuse  [I49.9] Dysrhythmia, cardiac  [I47.20] Ventricular tachycardia          ED Disposition Condition    Admit Stable      "           Irma Dent MD  08/31/24 0019

## 2024-08-31 NOTE — CONSULTS
Infectious Disease  Consult Note    Patient Name: Britton Perez   MRN: [id number]   Admission Date: 8/31/2024   Hospital Length of Stay: 0 days  Attending Physician: Cami Gallardo MD   Primary Care Provider: Kike Muller MD     Isolation Status: No active isolations       Assessment/Plan:       34-year-old male patient with past medical history of polysubstance abuse (heroin, Xanax), MI, seizure, HIV historically well controlled on Biktarvy, follows with Dr. Dr. Aston Montelongo, hepatitis-C (Most recent Hcv VL in 01/2024 undetectable), CHF with ICD in place, presents due to due to defibrillator shocked him 2 times while incarcerated, reportedly detoxing from heroin at this time, he is found to have bradycardia and having had Vtach, started on Lidocaine drip, no evidence of active infection, ID consulted for assistance in inpatient management of HIV.     HIV well controlled on Biktarvy   Vtach  ICD shock  Bradycardia   CHF   Polysubstance abuse including IVDU    -Reports taking his medication daily  -At previous visit in 01/2024 I had discussed his case with dr. Montelongo who reported the patient is adherent to his ART  -Patient reports most recent HIV vL was done a week ago and was around 500 suggesting intermittently missing doses but overall reasonable control   -He does not his most recent CD4   -he denies having any meningeal signs, no shortness of breath, no cough, no sputum production, no diarrhea, no urinary symptoms, no rashes  -no concerns for active infection or opportunistic infections   -Biktarvy is not formulary and the patient does not have his medicine    Recommendations:  -will discontinue order for Biktarvy  -start dolutegravir (Tivicay) 50 mg p.o. Q 24 hours as well as emtricitabine/tenofovir alafenamide (Descovy) 1 tablet daily Q 24 hours   -HIV viral load ordered, CD4 ordered, will add HCV viral load as the patient continues to use IV drugs and has a risk of having required HCV   -patient can resume  his Biktarvy after discharge, should continued following with Dr. Montelongo   -no further interventions at this time from infectious disease standpoint, please call us back should any new issues arise    Thank you for your consult. ID will sign off. Please contact us with any questions or concerns.    Antibiotics History:  N/A    Portions of this note dictated using EMR integrated voice recognition software, and may be subject to voice recognition errors not corrected at proofreading. Please contact writer for clarification if needed.    Subjective:     Principal Problem: <principal problem not specified>     HPI:   34-year-old male patient with past medical history of polysubstance abuse (heroin, Xanax), MI, seizure, HIV historically well controlled on Biktarvy, follows with Dr. Dr. Aston Montelongo, hepatitis-C (Most recent Hcv VL in 01/2024 undetectable), CHF with ICD in place, presents due to due to defibrillator shocked him 2 times while incarcerated, reportedly detoxing from heroin at this time, he is found to have bradycardia and having had Vtach, started on Lidocaine drip, no evidence of active infection, ID consulted for assistance in inpatient management of HIV.     Uncomfortable, in withdrawals, no localizing symptoms however    Past Medical History:   Diagnosis Date    Cardiac arrest 08/2019    pulseless, AED required    Depression     Heart failure, type unknown     fr other facility medical record    Hepatitis C     HIV (human immunodeficiency virus infection)     HTN (hypertension)     IV drug user     Myocardial infarct, old     fr transfer medical records    Neuropathy     left foot    Overdose     heroine, meth    Pneumonia     Polysubstance abuse     heroin methadone    PTSD (post-traumatic stress disorder)     from prev medical record    Seizure 08/2019    POSSIBLE Seizure prior to cardiac arrest, unclear        Past Surgical History:   Procedure Laterality Date    CHEST TUBE INSERTION Bilateral      bilat when intubated in the past (removed)    CORONARY ANGIOGRAPHY N/A 8/30/2019    Procedure: ANGIOGRAM, CORONARY ARTERY;  Surgeon: Misbah Culp MD;  Location: [facility] CATH;  Service: Cardiology;  Laterality: N/A;    GASTROSTOMY TUBE PLACEMENT      fr other facility medical record    INSERTION OF PACEMAKER  2007    INSERTION, PACEMAKER, TEMPORARY TRANSVENOUS Right 1/22/2024    Procedure: Insertion, Pacemaker, Temporary Transvenous;  Surgeon: Thad Interiano MD;  Location: [facility] CATH LAB;  Service: Cardiology;  Laterality: Right;    LEFT HEART CATHETERIZATION Left 8/30/2019    Procedure: Left heart cath;  Surgeon: Misbah Culp MD;  Location: [facility] CATH;  Service: Cardiology;  Laterality: Left;    REMOVAL OF PACEMAKER  2014    REMOVAL OF PACEMAKER N/A 1/26/2024    Procedure: Removal Cardiac Pacemaker;  Surgeon: Rock Hargrove MD;  Location: [facility] CATH LAB;  Service: Cardiology;  Laterality: N/A;  removal of TVP    TRACHEOSTOMY      x2 in the past (removed/healed as of August 2019)       Review of patient's allergies indicates:   Allergen Reactions    Penicillins         Family History    Reviewed noncontributory          Social History     Socioeconomic History    Marital status: Single   Tobacco Use    Smoking status: Every Day   Substance and Sexual Activity    Drug use: Yes     Types: IV, Methamphetamines     Comment: heroine opiates        Lines/Drains/Airways       Peripherally Inserted Central Catheter Line  Duration             PICC Triple Lumen 08/31/24 0958 right brachial <1 day                     Medication:  Medications Prior to Admission   Medication Sig    albuterol (PROVENTIL/VENTOLIN HFA) 90 mcg/actuation inhaler Inhale 1 puff into the lungs as needed.    BIKTARVY -25 mg per tablet Take 1 tablet by mouth once daily.    buprenorphine-naloxone (SUBOXONE) 8-2 mg Film Place 1 each under the tongue once daily.     busPIRone (BUSPAR) 30 MG Tab Take 1 tablet by mouth 2 (two) times daily.    lidocaine  (LIDODERM) 5 % Place 1 patch onto the skin once daily.    LORazepam (ATIVAN) 0.5 MG tablet Take 1 tablet (0.5 mg total) by mouth 3 (three) times daily as needed (withdrawal symptoms).    sulfamethoxazole-trimethoprim 800-160mg (BACTRIM DS) 800-160 mg Tab Take 1 tablet by mouth once daily.    thiamine 100 MG tablet Take 1 tablet (100 mg total) by mouth once daily.          Antimicrobials:  Antibiotics (From admission, onward)      Start     Stop Route Frequency Ordered    09/02/24 0900  mupirocin 2 % ointment         09/07/24 0859 Nasl 2 times daily 08/31/24 0604             Antifungals (From admission, onward)      None            Antivirals (From admission, onward)          Stop Route Frequency     [id number] ala         -- Oral Daily               Review of Systems   Review of Systems   All other systems reviewed and are negative.        Objective:     Vital Signs (Most Recent):  Temp: 98.1 °F (36.7 °C) (08/31/24 0815)  Pulse: 97 (08/31/24 0558)  Resp: (!) 29 (08/31/24 0558)  BP: 116/81 (08/31/24 0601)  SpO2: 96 % (08/31/24 1437)  Vital Signs (24h Range):  Temp:  [98.1 °F (36.7 °C)-98.4 °F (36.9 °C)] 98.1 °F (36.7 °C)  Pulse:  [] 97  Resp:  [8-29] 29  SpO2:  [95 %-100 %] 96 %  BP: (113-179)/() 116/81      Weight:   Wt Readings from Last 1 Encounters:   08/31/24 59 kg (130 lb)      Body mass index is Body mass index is 16.69 kg/m².     Estimated Creatinine Clearance: Estimated Creatinine Clearance: 106.5 mL/min (based on SCr of 0.8 mg/dL).     Physical Exam  Physical Exam  Constitutional:       Appearance: Normal appearance. He is ill-appearing.   HENT:      Head: Normocephalic and atraumatic.      Mouth/Throat:      Pharynx: No oropharyngeal exudate or posterior oropharyngeal erythema.   Eyes:      Extraocular Movements: Extraocular movements intact.      Pupils: Pupils are equal, round, and reactive to light.   Cardiovascular:      Rate and Rhythm: Normal rate and regular rhythm.       Heart sounds: No murmur heard.  Pulmonary:      Effort: No respiratory distress.      Breath sounds: No wheezing, rhonchi or rales.   Abdominal:      General: Bowel sounds are normal. There is no distension.      Palpations: Abdomen is soft.      Tenderness: There is no abdominal tenderness.   Musculoskeletal:         General: No swelling or tenderness.      Cervical back: Neck supple. No rigidity or tenderness.      Comments: No tenderness along the spine, no evidence of swollen or inflamed joints   Lymphadenopathy:      Cervical: No cervical adenopathy.   Skin:     Findings: No lesion or rash.   Neurological:      General: No focal deficit present.      Mental Status: He is alert and oriented to person, place, and time. Mental status is at baseline.      Cranial Nerves: No cranial nerve deficit.      Motor: No weakness.      Comments: Appears in withdrawals and discomfort   Psychiatric:         Mood and Affect: Mood normal.         Behavior: Behavior normal.           Significant Labs: CBC:   Recent Labs   Lab 08/31/24 0427   WBC 9.23   HGB 14.8   HCT 42.4        CMP:   Recent Labs   Lab 08/31/24 0427      K 4.2   *   CO2 16*   BUN 12.4   CREATININE 0.80   CALCIUM 9.6   ALBUMIN 3.7   BILITOT 0.6   ALKPHOS 70   AST 17   ALT 8         Microbiology Results (last 7 days)       ** No results found for the last 168 hours. **             Significant Imaging: I have reviewed all pertinent imaging results/findings within the past 24 hours.      Julian Sidhu MD  Infectious Disease  [facility]

## 2024-08-31 NOTE — CONSULTS
Inpatient consult to Cardiology  Consult performed by: Bonny Armstrong FNP  Consult ordered by: Irma Dent MD        [facility] - Emergency Dept    Cardiology  Consult Note    Patient Name: Britton Perez  MRN: [id number]  Admission Date: 8/31/2024  Hospital Length of Stay: 0 days  Code Status: Full Code   Attending Provider: Cami Gallardo MD   Consulting Provider: NESSA Glynn  Primary Care Physician: Kike Muller MD  Principal Problem:<principal problem not specified>    Patient information was obtained from patient, past medical records, and ER records.     Subjective:     Chief Complaint/Reason for Consult:     HPI:   Mr. Perez is a 35 y/o male, known to [facility] through hospital consult only, who presented to ED post SICD discharge x 2. He is currently incarcerated since 08.29.24 and is going through drug withdrawls- last use of heroin and xanax 3 days prior to admit. He was found to be bradycardia with HR in the 30s and was given atropine x 1. He then went into Vtach with subsequent ICD discharge and was placed on a lidocaine drip. Lab significant for Mg 1.40. He has been admitted to ICU. [facility] has been consulted due to ICD discharge.       PMH: Long QT syndrome, VT storm, Hypertension, Hepatitis C, HIV, Polysubstance Abuse, PTSD, Seizures, Neuropathy, Old MI, Depression, Cardiac Arrest/ICD, NICMO, Mycotic aneurysm  PSH:  C,  SICD ([employer]), Right and left sided ICD Placement & Removal due to infections,  Tracheostomy with Removal, Chest Tube Insertion/Removal  Family History: None  Social History: Tobacco- Active Use, Positive for Polysubstance Abuse- IV Methamphetamine Use, Alcohol- Negative     Previous Cardiac Diagnostics:   Echocardiogram (1.22.24):  Left Ventricle: The left ventricle is normal in size. Normal wall thickness. Normal wall motion. There is normal systolic function with a visually estimated ejection fraction of 55 - 60%. There is normal diastolic  function.  Right Ventricle: Normal right ventricular cavity size. Systolic function is normal. TAPSE is 2.04 cm.     Echocardiogram (8.15.21):  RVSP ~ 30mmHg   Normal left ventricle cavity size. Normal wall thickness. Mild to   moderately (40-45%) decreased ejection fraction.   Moderate mitral regurgitation.   Mild to moderate tricuspid regurgitation. Mild pulmonary hypertension present.      Coronary Angiogram (8.30.19):  Angiographically normal coronary arteries.  LV end diastolic pressure is normal.    Past Medical History:   Diagnosis Date    Cardiac arrest 08/2019    pulseless, AED required    Depression     Heart failure, type unknown     fr other facility medical record    Hepatitis C     HIV (human immunodeficiency virus infection)     HTN (hypertension)     IV drug user     Myocardial infarct, old     fr transfer medical records    Neuropathy     left foot    Overdose     heroine, meth    Pneumonia     Polysubstance abuse     heroin methadone    PTSD (post-traumatic stress disorder)     from prev medical record    Seizure 08/2019    POSSIBLE Seizure prior to cardiac arrest, unclear     Past Surgical History:   Procedure Laterality Date    CHEST TUBE INSERTION Bilateral     bilat when intubated in the past (removed)    CORONARY ANGIOGRAPHY N/A 8/30/2019    Procedure: ANGIOGRAM, CORONARY ARTERY;  Surgeon: Misbah Culp MD;  Location: [facility] CATH;  Service: Cardiology;  Laterality: N/A;    GASTROSTOMY TUBE PLACEMENT      fr other facility medical record    INSERTION OF PACEMAKER  2007    INSERTION, PACEMAKER, TEMPORARY TRANSVENOUS Right 1/22/2024    Procedure: Insertion, Pacemaker, Temporary Transvenous;  Surgeon: Thad Interiano MD;  Location: [facility] CATH LAB;  Service: Cardiology;  Laterality: Right;    LEFT HEART CATHETERIZATION Left 8/30/2019    Procedure: Left heart cath;  Surgeon: Misbah Culp MD;  Location: [facility] CATH;  Service: Cardiology;  Laterality: Left;    REMOVAL OF PACEMAKER  2014    REMOVAL OF  PACEMAKER N/A 1/26/2024    Procedure: Removal Cardiac Pacemaker;  Surgeon: Rock Hargrove MD;  Location: [facility] CATH LAB;  Service: Cardiology;  Laterality: N/A;  removal of TVP    TRACHEOSTOMY      x2 in the past (removed/healed as of August 2019)     Review of patient's allergies indicates:   Allergen Reactions    Penicillins      No current facility-administered medications on file prior to encounter.     Current Outpatient Medications on File Prior to Encounter   Medication Sig    albuterol (PROVENTIL/VENTOLIN HFA) 90 mcg/actuation inhaler Inhale 1 puff into the lungs as needed.    BIKTARVY -25 mg per tablet Take 1 tablet by mouth once daily.    buprenorphine-naloxone (SUBOXONE) 8-2 mg Film Place 1 each under the tongue once daily.     busPIRone (BUSPAR) 30 MG Tab Take 1 tablet by mouth 2 (two) times daily.    lidocaine (LIDODERM) 5 % Place 1 patch onto the skin once daily.    LORazepam (ATIVAN) 0.5 MG tablet Take 1 tablet (0.5 mg total) by mouth 3 (three) times daily as needed (withdrawal symptoms).    sulfamethoxazole-trimethoprim 800-160mg (BACTRIM DS) 800-160 mg Tab Take 1 tablet by mouth once daily.    thiamine 100 MG tablet Take 1 tablet (100 mg total) by mouth once daily.     Family History    None       Tobacco Use    Smoking status: Every Day    Smokeless tobacco: Not on file   Substance and Sexual Activity    Alcohol use: Not on file    Drug use: Yes     Types: IV, Methamphetamines     Comment: heroine opiates    Sexual activity: Not on file       Review of Systems   Respiratory: Negative.     Cardiovascular: Negative.    Gastrointestinal:  Positive for nausea.   Genitourinary: Negative.    Musculoskeletal: Negative.    Neurological: Negative.    Psychiatric/Behavioral:  The patient is nervous/anxious.        Objective:     Vital Signs (Most Recent):  Temp: 98.4 °F (36.9 °C) (08/31/24 0328)  Pulse: 97 (08/31/24 0558)  Resp: (!) 29 (08/31/24 0558)  BP: 116/81 (08/31/24 0601)  SpO2: 95 % (08/31/24  0558) Vital Signs (24h Range):  Temp:  [98.4 °F (36.9 °C)] 98.4 °F (36.9 °C)  Pulse:  [] 97  Resp:  [8-29] 29  SpO2:  [95 %-100 %] 95 %  BP: (113-179)/() 116/81   Weight: 59 kg (130 lb)  Body mass index is 16.69 kg/m².  SpO2: 95 %     No intake or output data in the 24 hours ending 08/31/24 0740  Lines/Drains/Airways       Peripheral Intravenous Line  Duration                  Peripheral IV - Single Lumen 08/31/24 0516 18 G Anterior;Left Upper Arm <1 day                  Significant Labs:   Chemistries:   Recent Labs   Lab 08/31/24 0427      K 4.2   *   CO2 16*   BUN 12.4   CREATININE 0.80   CALCIUM 9.6   BILITOT 0.6   ALKPHOS 70   ALT 8   AST 17   GLUCOSE 102*   MG 1.40*   TROPONINI <0.010        CBC/Anemia Labs: Coags:    Recent Labs   Lab 08/31/24 0427   WBC 9.23   HGB 14.8   HCT 42.4      MCV 85.7   RDW 12.7    Recent Labs   Lab 08/31/24  0542   INR 1.1        Significant Imaging:  Imaging Results              X-Ray Chest 1 View (In process)                   EKG:           Physical Exam  Cardiovascular:      Rate and Rhythm: Normal rate and regular rhythm.      Heart sounds: Normal heart sounds.   Pulmonary:      Effort: Pulmonary effort is normal.      Breath sounds: Normal breath sounds.   Abdominal:      Palpations: Abdomen is soft.   Musculoskeletal:         General: Normal range of motion.   Skin:     General: Skin is warm.   Neurological:      General: No focal deficit present.      Mental Status: He is alert.       Home Medications:   No current facility-administered medications on file prior to encounter.     Current Outpatient Medications on File Prior to Encounter   Medication Sig Dispense Refill    albuterol (PROVENTIL/VENTOLIN HFA) 90 mcg/actuation inhaler Inhale 1 puff into the lungs as needed.  5    BIKTARVY -25 mg per tablet Take 1 tablet by mouth once daily.  0    buprenorphine-naloxone (SUBOXONE) 8-2 mg Film Place 1 each under the tongue once daily.        busPIRone (BUSPAR) 30 MG Tab Take 1 tablet by mouth 2 (two) times daily.      lidocaine (LIDODERM) 5 % Place 1 patch onto the skin once daily.  0    LORazepam (ATIVAN) 0.5 MG tablet Take 1 tablet (0.5 mg total) by mouth 3 (three) times daily as needed (withdrawal symptoms). 12 tablet 0    sulfamethoxazole-trimethoprim 800-160mg (BACTRIM DS) 800-160 mg Tab Take 1 tablet by mouth once daily. 30 tablet 11    thiamine 100 MG tablet Take 1 tablet (100 mg total) by mouth once daily.       Current Schedule Inpatient Medications:   magnesium sulfate IVPB  2 g Intravenous Once    metoclopramide  5 mg Intravenous ED 1 Time    [START ON 9/2/2024] mupirocin   Nasal BID     Continuous Infusions:   LIDOcaine  2 mg/min Intravenous Continuous 15 mL/hr at 08/31/24 0611 2 mg/min at 08/31/24 0611     Assessment:   VF/SICD discharge  --[employer] SICD  --hx R & L AICD implant/explants due to endocarditis  Bradycardia  Long QT syndrome  NICMO  --recovered EF per TTE 01.22.24: EF 55-60%  --Hx MI  --normal coronaries 2019  Polysubstance abuse with delirium tremors  --last use of heroin/xanax 3 days prior to admit  Hepatitis C  HIV  Hypomagnesemia  --supplementation ordered  Hx of R PCA Mycotic aneurysm  Hx R parieto-Occipital ICH      Plan:   Obtain echo  Continue Lidocaine infusion  Trend enzymes      Thank you for your consult.     NESSA Glynn  Cardiology  [facility] - Emergency Dept  08/31/2024    I agree with the findings of the complexity of problems addressed and take responsibility for the plan's risks and complications. I approved the plan documented by Bonny Armstrong NP.

## 2024-08-31 NOTE — NURSING
Pt w/o IV on admit from ER. Attempted to insert long term IV , but pt said it hurt too much and attempt was terminated. Pt agreed to PICC. While waiting, he had episode of junctional hector in 30-40s which recovered. Refused to allow pacer pads to be applied for safety. PICC nurse arrived and is currently inserting PICC. Dr Dove here also and said to get Lidocaine gtt back on ASAP. Pt also refused lab draw.

## 2024-08-31 NOTE — H&P
[facility] - Emergency Dept  Pulmonary Critical Care Note    Patient Name: Britton Perez  MRN: [id number]  Admission Date: 8/31/2024  Hospital Length of Stay: 0 days  Code Status: Prior  Attending Provider: Cami Gallardo MD  Primary Care Provider: iKke Muller MD     Subjective:     HPI:   Patient is a 34-year-old male with past medical history of polysubstance abuse (heroin, Xanax), MI, seizure, HIV, hepatitis-C, heart failure presents due to due to defibrillator ([employer]) shocked him 2 times.  Patient is currently incarcerated at [facility] since Thursday.  He reports that he is currently withdrawing from heroin use and Xanax use (3 bars/day) with last time he used being 3 days ago.  He reports nausea, vomiting, restless legs, and anxiety.  In the ED chest x-ray shows AICD implant in place otherwise unremarkable, CBC looks unremarkable.  Patient is afebrile, satting 99% on room air.  PT INR normal, negative BNP, troponin, H and H unremarkable.  EKG shows sinus bradycardia.  Patient received 1 mg atropine and was started on a lidocaine 2000 mg in D5W 250 mL infusion.      Hospital Course/Significant events:  08/31/2024 admit to ICU    24 Hour Interval History:  N/a    Past Medical History:   Diagnosis Date    Cardiac arrest 08/2019    pulseless, AED required    Depression     Heart failure, type unknown     fr other facility medical record    Hepatitis C     HIV (human immunodeficiency virus infection)     HTN (hypertension)     IV drug user     Myocardial infarct, old     fr transfer medical records    Neuropathy     left foot    Overdose     heroine, meth    Pneumonia     Polysubstance abuse     heroin methadone    PTSD (post-traumatic stress disorder)     from prev medical record    Seizure 08/2019    POSSIBLE Seizure prior to cardiac arrest, unclear       Past Surgical History:   Procedure Laterality Date    CHEST TUBE INSERTION Bilateral     bilat when intubated in the past (removed)     CORONARY ANGIOGRAPHY N/A 8/30/2019    Procedure: ANGIOGRAM, CORONARY ARTERY;  Surgeon: Misbah Culp MD;  Location: [facility] CATH;  Service: Cardiology;  Laterality: N/A;    GASTROSTOMY TUBE PLACEMENT      fr other facility medical record    INSERTION OF PACEMAKER  2007    INSERTION, PACEMAKER, TEMPORARY TRANSVENOUS Right 1/22/2024    Procedure: Insertion, Pacemaker, Temporary Transvenous;  Surgeon: Thad Interiano MD;  Location: [facility] CATH LAB;  Service: Cardiology;  Laterality: Right;    LEFT HEART CATHETERIZATION Left 8/30/2019    Procedure: Left heart cath;  Surgeon: Misbah Culp MD;  Location: [facility] CATH;  Service: Cardiology;  Laterality: Left;    REMOVAL OF PACEMAKER  2014    REMOVAL OF PACEMAKER N/A 1/26/2024    Procedure: Removal Cardiac Pacemaker;  Surgeon: Rock Hargrove MD;  Location: [facility] CATH LAB;  Service: Cardiology;  Laterality: N/A;  removal of TVP    TRACHEOSTOMY      x2 in the past (removed/healed as of August 2019)       Social History     Socioeconomic History    Marital status: Single   Tobacco Use    Smoking status: Every Day   Substance and Sexual Activity    Drug use: Yes     Types: IV, Methamphetamines     Comment: heroine opiates           Current Outpatient Medications   Medication Instructions    albuterol (PROVENTIL/VENTOLIN HFA) 90 mcg/actuation inhaler 1 puff, Inhalation, As needed (PRN)    BIKTARVY -25 mg per tablet 1 tablet, Oral, Daily    buprenorphine-naloxone (SUBOXONE) 8-2 mg Film 1 each, Sublingual, Daily    busPIRone (BUSPAR) 30 MG Tab 1 tablet, Oral, 2 times daily    lidocaine (LIDODERM) 5 % 1 patch, Transdermal, Daily    LORazepam (ATIVAN) 0.5 mg, Oral, 3 times daily PRN    sulfamethoxazole-trimethoprim 800-160mg (BACTRIM DS) 800-160 mg Tab 1 tablet, Oral, Daily    thiamine 100 mg, Oral, Daily       Current Inpatient Medications   [START ON 9/2/2024] mupirocin   Nasal BID       Current Intravenous Infusions   LIDOcaine  2 mg/min Intravenous Continuous 15 mL/hr at  08/31/24 0611 2 mg/min at 08/31/24 0611         Review of Systems   Constitutional:  Negative for chills and fever.   Eyes:  Negative for blurred vision and double vision.   Respiratory:  Negative for cough and shortness of breath.    Cardiovascular:  Negative for chest pain, palpitations and leg swelling.   Gastrointestinal:  Positive for nausea and vomiting. Negative for abdominal pain, constipation and diarrhea.   Genitourinary:  Negative for dysuria.   Musculoskeletal:  Negative for myalgias and neck pain.   Neurological:  Negative for dizziness and headaches.   Psychiatric/Behavioral:  The patient is nervous/anxious.         Restless legs          Objective:     No intake or output data in the 24 hours ending 08/31/24 0644      Vital Signs (Most Recent):  Temp: 98.4 °F (36.9 °C) (08/31/24 0328)  Pulse: 97 (08/31/24 0558)  Resp: (!) 29 (08/31/24 0558)  BP: 116/81 (08/31/24 0601)  SpO2: 95 % (08/31/24 0558)  Body mass index is 16.69 kg/m².  Weight: 59 kg (130 lb) Vital Signs (24h Range):  Temp:  [98.4 °F (36.9 °C)] 98.4 °F (36.9 °C)  Pulse:  [] 97  Resp:  [8-29] 29  SpO2:  [95 %-100 %] 95 %  BP: (113-179)/() 116/81     Physical Exam  HENT:      Head: Normocephalic and atraumatic.      Mouth/Throat:      Mouth: Mucous membranes are moist.      Pharynx: Oropharynx is clear.   Eyes:      General: No scleral icterus.     Extraocular Movements: Extraocular movements intact.      Conjunctiva/sclera: Conjunctivae normal.      Pupils: Pupils are equal, round, and reactive to light.   Cardiovascular:      Rate and Rhythm: Normal rate and regular rhythm.      Pulses: Normal pulses.      Heart sounds: Normal heart sounds. No murmur heard.     No friction rub. No gallop.   Pulmonary:      Effort: Tachypnea present. No respiratory distress.      Breath sounds: No stridor. No wheezing, rhonchi or rales.   Chest:      Chest wall: No tenderness.   Abdominal:      General: Abdomen is flat. Bowel sounds are normal.  "There is no distension.      Palpations: Abdomen is soft. There is no mass.      Tenderness: There is no abdominal tenderness. There is no guarding or rebound.      Hernia: No hernia is present.   Musculoskeletal:         General: No swelling.   Skin:     General: Skin is warm and dry.      Capillary Refill: Capillary refill takes less than 2 seconds.      Coloration: Skin is not jaundiced.      Findings: No bruising.   Neurological:      General: No focal deficit present.      Mental Status: He is alert.           Lines/Drains/Airways       Peripheral Intravenous Line  Duration                  Peripheral IV - Single Lumen 08/31/24 0516 18 G Anterior;Left Upper Arm <1 day                    Significant Labs:    Lab Results   Component Value Date    WBC 9.23 08/31/2024    HGB 14.8 08/31/2024    HCT 42.4 08/31/2024    MCV 85.7 08/31/2024     08/31/2024           BMP  Lab Results   Component Value Date     08/31/2024    K 4.2 08/31/2024    CHLORIDE 104 10/01/2021    CO2 16 (L) 08/31/2024    BUN 12.4 08/31/2024    CREATININE 0.80 08/31/2024    CALCIUM 9.6 08/31/2024    AGAP 13.0 08/31/2024    ESTGFRAFRICA >105 10/01/2021    EGFRNONAA >105 09/10/2021         ABG  No results for input(s): "PH", "PO2", "PCO2", "HCO3", "POCBASEDEF" in the last 168 hours.    Mechanical Ventilation Support:         Significant Imaging:  I have reviewed the pertinent imaging within the past 24 hours.        Assessment/Plan:     Assessment  Ventricular tachycardia secondary to polysubstance abuse, AICD implanted  Heroin/Xanax abuse      Plan  -admit to ICU  -started patient on lidocaine drip to prevent recurrent arrhythmia  -monitoring and repleting electrolytes  -Started on buprenorphine and  chlordiazepoxide for withdrawal  -consulted ID for assistance with HIV/Hep C management      DVT Prophylaxis: SCDs  GI Prophylaxis: -      32 minutes of critical care was time spent personally by me on the following activities: development " of treatment plan with patient or surrogate and bedside caregivers, discussions with consultants, evaluation of patient's response to treatment, examination of patient, ordering and performing treatments and interventions, ordering and review of laboratory studies, ordering and review of radiographic studies, pulse oximetry, re-evaluation of patient's condition.  This critical care time did not overlap with that of any other provider or involve time for any procedures.     Isabelle Desai DO  Pulmonary Critical Care Medicine  [facility] - Emergency Dept  DOS: 08/31/2024

## 2024-08-31 NOTE — Clinical Note
Diagnosis: Ventricular tachycardia [645161]   Future Attending Provider: AVANI TORRES [[id number]]   Admit to which facility:: [facility] [[id number]]   Reason for IP Medical Treatment  (Clinical interventions that can only be accomplished in the IP setting? ) :: arrhythmia

## 2024-08-31 NOTE — PROCEDURES
"Britton Perez is a 34 y.o. male patient.    Temp: 98.1 °F (36.7 °C) (08/31/24 0815)  Pulse: 97 (08/31/24 0558)  Resp: (!) 29 (08/31/24 0558)  BP: 116/81 (08/31/24 0601)  SpO2: 95 % (08/31/24 0558)  Weight: 59 kg (130 lb) (08/31/24 0328)  Height: 6' 2" (188 cm) (08/31/24 0328)    PICC  Date/Time: 8/31/2024 9:58 AM  Performed by: Elliot Guevara RN  Consent Done: Yes  Time out: Immediately prior to procedure a time out was called to verify the correct patient, procedure, equipment, support staff and site/side marked as required  Indications: med administration and vascular access  Anesthesia: local infiltration  Local anesthetic: lidocaine 1% without epinephrine    Preparation: skin prepped with ChloraPrep  Skin prep agent dried: skin prep agent completely dried prior to procedure  Sterile barriers: all five maximum sterile barriers used - cap, mask, sterile gown, sterile gloves, and large sterile sheet  Hand hygiene: hand hygiene performed prior to central venous catheter insertion  Location details: right brachial  Catheter type: triple lumen  Catheter size: 5 Fr  Catheter Length: 35cm    Ultrasound guidance: yes  Vessel Caliber: patent, compressibility normal  Needle advanced into vessel with real time Ultrasound guidance.  Guidewire confirmed in vessel.  Sterile sheath used.  Number of attempts: 1  Post-procedure: blood return through all ports, chlorhexidine patch and sterile dressing applied            Name Nate FAJARDO   8/31/2024    "

## 2024-09-01 LAB
ALBUMIN SERPL-MCNC: 3.8 G/DL (ref 3.5–5)
ALBUMIN/GLOB SERPL: 1.2 RATIO (ref 1.1–2)
ALP SERPL-CCNC: 68 UNIT/L (ref 40–150)
ALT SERPL-CCNC: 8 UNIT/L (ref 0–55)
ANION GAP SERPL CALC-SCNC: 10 MEQ/L
APICAL FOUR CHAMBER EJECTION FRACTION: 61 %
APICAL TWO CHAMBER EJECTION FRACTION: 71 %
AST SERPL-CCNC: 12 UNIT/L (ref 5–34)
AV INDEX (PROSTH): 0.64
AV MEAN GRADIENT: 3 MMHG
AV PEAK GRADIENT: 6 MMHG
AV VALVE AREA BY VELOCITY RATIO: 2.55 CM²
AV VALVE AREA: 2.42 CM²
AV VELOCITY RATIO: 0.67
BASOPHILS # BLD AUTO: 0.01 X10(3)/MCL
BASOPHILS NFR BLD AUTO: 0.1 %
BILIRUB SERPL-MCNC: 0.4 MG/DL
BSA FOR ECHO PROCEDURE: 1.75 M2
BUN SERPL-MCNC: 10.7 MG/DL (ref 8.9–20.6)
CALCIUM SERPL-MCNC: 9.2 MG/DL (ref 8.4–10.2)
CHLORIDE SERPL-SCNC: 105 MMOL/L (ref 98–107)
CO2 SERPL-SCNC: 25 MMOL/L (ref 22–29)
CREAT SERPL-MCNC: 0.76 MG/DL (ref 0.73–1.18)
CREAT/UREA NIT SERPL: 14
CV ECHO LV RWT: 0.25 CM
DOP CALC AO PEAK VEL: 1.25 M/S
DOP CALC AO VTI: 31.4 CM
DOP CALC LVOT AREA: 3.8 CM2
DOP CALC LVOT DIAMETER: 2.2 CM
DOP CALC LVOT PEAK VEL: 0.84 M/S
DOP CALC LVOT STROKE VOLUME: 75.99 CM3
DOP CALC MV VTI: 43.4 CM
DOP CALCLVOT PEAK VEL VTI: 20 CM
E WAVE DECELERATION TIME: 203 MSEC
E/E' RATIO: 7.88 M/S
ECHO LV POSTERIOR WALL: 0.8 CM (ref 0.6–1.1)
EOSINOPHIL # BLD AUTO: 0 X10(3)/MCL (ref 0–0.9)
EOSINOPHIL NFR BLD AUTO: 0 %
ERYTHROCYTE [DISTWIDTH] IN BLOOD BY AUTOMATED COUNT: 12.9 % (ref 11.5–17)
FRACTIONAL SHORTENING: 38 % (ref 28–44)
GFR SERPLBLD CREATININE-BSD FMLA CKD-EPI: >60 ML/MIN/1.73/M2
GLOBULIN SER-MCNC: 3.3 GM/DL (ref 2.4–3.5)
GLUCOSE SERPL-MCNC: 118 MG/DL (ref 74–100)
HCT VFR BLD AUTO: 38.6 % (ref 42–52)
HGB BLD-MCNC: 13.7 G/DL (ref 14–18)
HR MV ECHO: 41 BPM
IMM GRANULOCYTES # BLD AUTO: 0.02 X10(3)/MCL (ref 0–0.04)
IMM GRANULOCYTES NFR BLD AUTO: 0.2 %
INTERVENTRICULAR SEPTUM: 0.7 CM (ref 0.6–1.1)
LEFT ATRIUM AREA SYSTOLIC (APICAL 2 CHAMBER): 20 CM2
LEFT ATRIUM AREA SYSTOLIC (APICAL 4 CHAMBER): 18.8 CM2
LEFT ATRIUM SIZE: 4.1 CM
LEFT ATRIUM VOLUME INDEX MOD: 29.7 ML/M2
LEFT ATRIUM VOLUME MOD: 53.8 CM3
LEFT INTERNAL DIMENSION IN SYSTOLE: 3.9 CM (ref 2.1–4)
LEFT VENTRICLE DIASTOLIC VOLUME INDEX: 111.05 ML/M2
LEFT VENTRICLE DIASTOLIC VOLUME: 201 ML
LEFT VENTRICLE END DIASTOLIC VOLUME APICAL 2 CHAMBER: 118 ML
LEFT VENTRICLE END DIASTOLIC VOLUME APICAL 4 CHAMBER: 137 ML
LEFT VENTRICLE END SYSTOLIC VOLUME APICAL 2 CHAMBER: 53.5 ML
LEFT VENTRICLE END SYSTOLIC VOLUME APICAL 4 CHAMBER: 50.7 ML
LEFT VENTRICLE MASS INDEX: 104 G/M2
LEFT VENTRICLE SYSTOLIC VOLUME INDEX: 36.4 ML/M2
LEFT VENTRICLE SYSTOLIC VOLUME: 65.9 ML
LEFT VENTRICULAR INTERNAL DIMENSION IN DIASTOLE: 6.3 CM (ref 3.5–6)
LEFT VENTRICULAR MASS: 187.39 G
LV LATERAL E/E' RATIO: 6.5 M/S
LV SEPTAL E/E' RATIO: 10 M/S
LVED V (TEICH): 201 ML
LVES V (TEICH): 65.9 ML
LVOT MG: 1 MMHG
LVOT MV: 0.54 CM/S
LYMPHOCYTES # BLD AUTO: 1.94 X10(3)/MCL (ref 0.6–4.6)
LYMPHOCYTES NFR BLD AUTO: 21.2 %
MAGNESIUM SERPL-MCNC: 2.4 MG/DL (ref 1.6–2.6)
MCH RBC QN AUTO: 29.7 PG (ref 27–31)
MCHC RBC AUTO-ENTMCNC: 35.5 G/DL (ref 33–36)
MCV RBC AUTO: 83.5 FL (ref 80–94)
MONOCYTES # BLD AUTO: 0.68 X10(3)/MCL (ref 0.1–1.3)
MONOCYTES NFR BLD AUTO: 7.4 %
MV MEAN GRADIENT: 1 MMHG
MV PEAK E VEL: 1.3 M/S
MV PEAK GRADIENT: 9 MMHG
MV VALVE AREA BY CONTINUITY EQUATION: 1.75 CM2
NEUTROPHILS # BLD AUTO: 6.48 X10(3)/MCL (ref 2.1–9.2)
NEUTROPHILS NFR BLD AUTO: 71.1 %
NRBC BLD AUTO-RTO: 0 %
OHS LV EJECTION FRACTION SIMPSONS BIPLANE MOD: 63 %
OHS QRS DURATION: 84 MS
OHS QTC CALCULATION: 502 MS
PHOSPHATE SERPL-MCNC: 2.7 MG/DL (ref 2.3–4.7)
PISA TR MAX VEL: 2.7 M/S
PLATELET # BLD AUTO: 179 X10(3)/MCL (ref 130–400)
PMV BLD AUTO: 9.8 FL (ref 7.4–10.4)
POTASSIUM SERPL-SCNC: 3.9 MMOL/L (ref 3.5–5.1)
PROT SERPL-MCNC: 7.1 GM/DL (ref 6.4–8.3)
PV PEAK GRADIENT: 9 MMHG
PV PEAK VELOCITY: 1.54 M/S
RA PRESSURE ESTIMATED: 3 MMHG
RBC # BLD AUTO: 4.62 X10(6)/MCL (ref 4.7–6.1)
RV TB RVSP: 6 MMHG
SODIUM SERPL-SCNC: 140 MMOL/L (ref 136–145)
TDI LATERAL: 0.2 M/S
TDI SEPTAL: 0.13 M/S
TDI: 0.17 M/S
TR MAX PG: 29 MMHG
TRICUSPID ANNULAR PLANE SYSTOLIC EXCURSION: 2.52 CM
TV REST PULMONARY ARTERY PRESSURE: 32 MMHG
WBC # BLD AUTO: 9.13 X10(3)/MCL (ref 4.5–11.5)
Z-SCORE OF LEFT VENTRICULAR DIMENSION IN END DIASTOLE: 2.3
Z-SCORE OF LEFT VENTRICULAR DIMENSION IN END SYSTOLE: 1.83

## 2024-09-01 PROCEDURE — 25000003 PHARM REV CODE 250

## 2024-09-01 PROCEDURE — 25000003 PHARM REV CODE 250: Performed by: INTERNAL MEDICINE

## 2024-09-01 PROCEDURE — 80053 COMPREHEN METABOLIC PANEL: CPT

## 2024-09-01 PROCEDURE — 63600175 PHARM REV CODE 636 W HCPCS: Performed by: INTERNAL MEDICINE

## 2024-09-01 PROCEDURE — 36415 COLL VENOUS BLD VENIPUNCTURE: CPT

## 2024-09-01 PROCEDURE — A4216 STERILE WATER/SALINE, 10 ML: HCPCS | Performed by: INTERNAL MEDICINE

## 2024-09-01 PROCEDURE — 84100 ASSAY OF PHOSPHORUS: CPT

## 2024-09-01 PROCEDURE — 25000003 PHARM REV CODE 250: Performed by: GENERAL PRACTICE

## 2024-09-01 PROCEDURE — 85025 COMPLETE CBC W/AUTO DIFF WBC: CPT

## 2024-09-01 PROCEDURE — 63600175 PHARM REV CODE 636 W HCPCS: Performed by: EMERGENCY MEDICINE

## 2024-09-01 PROCEDURE — 21400001 HC TELEMETRY ROOM

## 2024-09-01 PROCEDURE — 83735 ASSAY OF MAGNESIUM: CPT

## 2024-09-01 PROCEDURE — 36568 INSJ PICC <5 YR W/O IMAGING: CPT

## 2024-09-01 PROCEDURE — 25000003 PHARM REV CODE 250: Performed by: STUDENT IN AN ORGANIZED HEALTH CARE EDUCATION/TRAINING PROGRAM

## 2024-09-01 PROCEDURE — 93005 ELECTROCARDIOGRAM TRACING: CPT

## 2024-09-01 PROCEDURE — 11000001 HC ACUTE MED/SURG PRIVATE ROOM

## 2024-09-01 RX ORDER — DIPHENHYDRAMINE HCL 25 MG
25 CAPSULE ORAL NIGHTLY PRN
Status: DISCONTINUED | OUTPATIENT
Start: 2024-09-01 | End: 2024-09-02 | Stop reason: HOSPADM

## 2024-09-01 RX ORDER — GABAPENTIN 300 MG/1
300 CAPSULE ORAL ONCE
Status: COMPLETED | OUTPATIENT
Start: 2024-09-01 | End: 2024-09-01

## 2024-09-01 RX ORDER — TALC
9 POWDER (GRAM) TOPICAL NIGHTLY PRN
Status: DISCONTINUED | OUTPATIENT
Start: 2024-09-01 | End: 2024-09-02 | Stop reason: HOSPADM

## 2024-09-01 RX ADMIN — ALPRAZOLAM 0.25 MG: 0.25 TABLET ORAL at 08:09

## 2024-09-01 RX ADMIN — DOLUTEGRAVIR SODIUM 50 MG: 50 TABLET, FILM COATED ORAL at 09:09

## 2024-09-01 RX ADMIN — Medication 9 MG: at 01:09

## 2024-09-01 RX ADMIN — Medication 9 MG: at 08:09

## 2024-09-01 RX ADMIN — ONDANSETRON 4 MG: 2 INJECTION INTRAMUSCULAR; INTRAVENOUS at 06:09

## 2024-09-01 RX ADMIN — ALPRAZOLAM 0.25 MG: 0.25 TABLET ORAL at 09:09

## 2024-09-01 RX ADMIN — LIDOCAINE HYDROCHLORIDE 2 MG/MIN: 8 INJECTION, SOLUTION INTRAVENOUS at 03:09

## 2024-09-01 RX ADMIN — EMTRICITABINE AND TENOFOVIR ALAFENAMIDE 1 TABLET: 200; 25 TABLET ORAL at 09:09

## 2024-09-01 RX ADMIN — SODIUM CHLORIDE, PRESERVATIVE FREE 10 ML: 5 INJECTION INTRAVENOUS at 11:09

## 2024-09-01 RX ADMIN — ALPRAZOLAM 0.25 MG: 0.25 TABLET ORAL at 04:09

## 2024-09-01 RX ADMIN — DIPHENHYDRAMINE HYDROCHLORIDE 25 MG: 25 CAPSULE ORAL at 11:09

## 2024-09-01 RX ADMIN — GABAPENTIN 300 MG: 300 CAPSULE ORAL at 02:09

## 2024-09-01 RX ADMIN — Medication 10 ML: at 11:09

## 2024-09-01 RX ADMIN — BUPRENORPHINE 8 MG: 8 TABLET SUBLINGUAL at 10:09

## 2024-09-01 NOTE — H&P
"[facility] Hospital Medicine History & Physical Examination       Patient Name: Britton Perez  MRN: [id number]  Patient Class: IP- Inpatient   Admission Date: 8/31/2024   Admitting Physician: ANNE Service   Length of Stay: 1  Attending Physician: Fauzia James DO  Primary Care Provider: Kike Muller MD  Face-to-Face encounter date: 09/01/2024  Code Status:Full code  Chief Complaint: AICD Problem (Currently incarcerated at [facility] since Thursday. Last Heroin use on Wednesday. Also detoxing Xanax (3 bars a day) States defibrillator  ([employer]) "fired" x 2. Hx this happening in past with detox of Heroin. +Vomiting. No IV. +hx HIV, CHF)      Screening for Social Drivers for health:  Patient screened for food insecurity, housing instability, transportation needs, utility difficulties, and interpersonal safety (select all that apply as identified as concern)  []Housing or Food  []Transportation Needs  []Utility Difficulties  []Interpersonal safety  [x]None      Patient information was obtained from patient, patient's family, past medical records and ER records.  ED records were reviewed in detail and documented below    HISTORY OF PRESENT ILLNESS:   Britton Perez is a 34 y.o. male who  has a past medical history of Cardiac arrest (08/2019), Depression, Heart failure, type unknown, Hepatitis C, HIV (human immunodeficiency virus infection), HTN (hypertension), IV drug user, Myocardial infarct, old, Neuropathy, Overdose, Pneumonia, Polysubstance abuse, PTSD (post-traumatic stress disorder), and Seizure (08/2019).. The patient presented to [facility] on 8/31/2024 with a primary complaint of due to his defibrillator shocking him 3 times.  Patient is currently incarcerated in detention since Thursday.  Patient says that he is currently withdrawing from heroin use and Xanax use we will last time being 3 days ago prior to arrival.  Patient reports nausea vomiting restless legs and anxiety.  Labs and " vitals otherwise looked unremarkable.  EKG showed sinus bradycardia and patient received 1 mg of atropine and was started on lidocaine drip and was admitted to the ICU.  Cardiology was consulted and patient is bradycardia was discontinued.  Cardiology signed off after patient refused pacemaker.  Patient was transferred to us for further care services      PAST MEDICAL HISTORY:     Past Medical History:   Diagnosis Date    Cardiac arrest 08/2019    pulseless, AED required    Depression     Heart failure, type unknown     fr other facility medical record    Hepatitis C     HIV (human immunodeficiency virus infection)     HTN (hypertension)     IV drug user     Myocardial infarct, old     fr transfer medical records    Neuropathy     left foot    Overdose     heroine, meth    Pneumonia     Polysubstance abuse     heroin methadone    PTSD (post-traumatic stress disorder)     from prev medical record    Seizure 08/2019    POSSIBLE Seizure prior to cardiac arrest, unclear       PAST SURGICAL HISTORY:     Past Surgical History:   Procedure Laterality Date    CHEST TUBE INSERTION Bilateral     bilat when intubated in the past (removed)    CORONARY ANGIOGRAPHY N/A 8/30/2019    Procedure: ANGIOGRAM, CORONARY ARTERY;  Surgeon: Misbah Culp MD;  Location: [facility] CATH;  Service: Cardiology;  Laterality: N/A;    GASTROSTOMY TUBE PLACEMENT      fr other facility medical record    INSERTION OF PACEMAKER  2007    INSERTION, PACEMAKER, TEMPORARY TRANSVENOUS Right 1/22/2024    Procedure: Insertion, Pacemaker, Temporary Transvenous;  Surgeon: Thad Interiano MD;  Location: [facility] CATH LAB;  Service: Cardiology;  Laterality: Right;    LEFT HEART CATHETERIZATION Left 8/30/2019    Procedure: Left heart cath;  Surgeon: Misbah Culp MD;  Location: [facility] CATH;  Service: Cardiology;  Laterality: Left;    REMOVAL OF PACEMAKER  2014    REMOVAL OF PACEMAKER N/A 1/26/2024    Procedure: Removal Cardiac Pacemaker;  Surgeon: Rock Hargrove MD;   Location: [facility] CATH LAB;  Service: Cardiology;  Laterality: N/A;  removal of TVP    TRACHEOSTOMY      x2 in the past (removed/healed as of August 2019)       ALLERGIES:   Penicillins    FAMILY HISTORY:   Reviewed and negative    SOCIAL HISTORY:     Social History     Tobacco Use    Smoking status: Every Day    Smokeless tobacco: Not on file   Substance Use Topics    Alcohol use: Not on file        HOME MEDICATIONS:     Prior to Admission medications    Medication Sig Start Date End Date Taking? Authorizing Provider   albuterol (PROVENTIL/VENTOLIN HFA) 90 mcg/actuation inhaler Inhale 1 puff into the lungs as needed. 5/24/19   Provider, Historical   BIKTARVY -25 mg per tablet Take 1 tablet by mouth once daily. 8/17/19   Provider, Historical   buprenorphine-naloxone (SUBOXONE) 8-2 mg Film Place 1 each under the tongue once daily.  7/24/19   Provider, Historical   busPIRone (BUSPAR) 30 MG Tab Take 1 tablet by mouth 2 (two) times daily. 7/15/19   Provider, Historical   lidocaine (LIDODERM) 5 % Place 1 patch onto the skin once daily. 7/25/19   Provider, Historical   LORazepam (ATIVAN) 0.5 MG tablet Take 1 tablet (0.5 mg total) by mouth 3 (three) times daily as needed (withdrawal symptoms). 8/31/19 9/30/19  Jamarcus Jane MD   sulfamethoxazole-trimethoprim 800-160mg (BACTRIM DS) 800-160 mg Tab Take 1 tablet by mouth once daily. 1/28/24 1/27/25  Jose Patel MD   thiamine 100 MG tablet Take 1 tablet (100 mg total) by mouth once daily. 9/2/19   Jamarcus Jane MD       REVIEW OF SYSTEMS:   Except as documented, all other systems reviewed and negative     PHYSICAL EXAM:     VITAL SIGNS: 24 HRS MIN & MAX LAST   Temp  Min: 98.1 °F (36.7 °C)  Max: 98.5 °F (36.9 °C) 98.5 °F (36.9 °C)   BP  Min: 110/47  Max: 134/70 128/69   Pulse  Min: 38  Max: 49  (!) 38   Resp  Min: 12  Max: 25 20   SpO2  Min: 94 %  Max: 97 % 95 %     General appearance: Well-developed, well-nourished male in no apparent distress.  HENT:  Atraumatic head. Moist mucous membranes of oral cavity.  Eyes: Normal extraocular movements.   Neck: Supple.   Lungs: Clear to auscultation bilaterally. No wheezing present.   Heart:  Bradycardia S1 and S2 present with no murmurs/gallop/rub. No pedal edema. No JVD present.   Abdomen: Soft, non-distended, non-tender. No rebound tenderness/guarding. Bowel sounds are normal.   Extremities: No cyanosis, clubbing, or edema.  Skin: No Rash.   Neuro: Motor and sensory exams grossly intact. Good tone. Muscle strength 5/5 in all 4 extremities  Psych/mental status: Appropriate mood and affect. Responds appropriately to questions.     LABS AND IMAGING:     Recent Labs   Lab 08/31/24 0427 09/01/24  0407   WBC 9.23 9.13   RBC 4.95 4.62*   HGB 14.8 13.7*   HCT 42.4 38.6*   MCV 85.7 83.5   MCH 29.9 29.7   MCHC 34.9 35.5   RDW 12.7 12.9    179   MPV 9.8 9.8       Recent Labs   Lab 08/31/24 0427 09/01/24  0407    140   K 4.2 3.9   * 105   CO2 16* 25   BUN 12.4 10.7   CREATININE 0.80 0.76   CALCIUM 9.6 9.2   MG 1.40* 2.40   ALBUMIN 3.7 3.8   ALKPHOS 70 68   ALT 8 8   AST 17 12   BILITOT 0.6 0.4       Microbiology Results (last 7 days)       ** No results found for the last 168 hours. **             Echo Saline Bubble? No; Ultrasound enhancing contrast? No    Left Ventricle: The left ventricle is mildly dilated. Normal wall   thickness. There is normal systolic function with a visually estimated   ejection fraction of 60 - 65%. There is normal diastolic function.    Right Ventricle: Normal right ventricular cavity size. Systolic   function is normal. TAPSE is 2.52 cm.    Left Atrium: Left atrium is dilated.    Aortic Valve: The aortic valve is a trileaflet valve.    Mitral Valve: There is mitral annular calcification present. There is   no stenosis. The mean pressure gradient across the mitral valve is 1 mmHg   at a heart rate of 41 bpm. There is mild regurgitation.    Tricuspid Valve: There is moderate  regurgitation.    Pulmonary Artery: The estimated pulmonary artery systolic pressure is   32 mmHg.    IVC/SVC: Normal venous pressure at 3 mmHg.      ASSESSMENT & PLAN:   VFib/as ICD discharge; history of right and left AICD implant/explant due to endocarditis   Bradycardia   Prolonged QT  Nonischemic cardiomyopathy   Polysubstance abuse  Hepatitis-C   HIV    Cardiology was consulted and lidocaine drip was discontinued   Patient refused pacemaker however patient now agreeable   EP consulted on Tuesday to evaluate   Infectious disease consulted for retroviral therapy   Avoid all prolonged QT meds   Continue to monitor tele   Keep Mag greater than 2 and potassium greater than 4   Labs and vitals stable   Labs in a.m.        VTE Prophylaxis: will be placed on Heparin/Lovenox/ Xarelto/ SCD for DVT prophylaxis and will be advised to be as mobile as possible and sit in a chair as tolerated    Patient condition:  Stable/Fair/Guarded/ Serious/ Critical    __________________________________________________________________________  INPATIENT LIST OF MEDICATIONS     Scheduled Meds:   buprenorphine HCL  8 mg Sublingual Daily    dolutegravir  50 mg Oral Daily    emtricitabine-tenofovir alafen  1 tablet Oral Daily    [START ON 9/2/2024] mupirocin   Nasal BID    sodium chloride 0.9%  10 mL Intravenous Q6H    sodium chloride 0.9%  10 mL Intravenous Q6H     Continuous Infusions:   LIDOcaine  2 mg/min Intravenous Continuous   Stopped at 09/01/24 0855     PRN Meds:.  Current Facility-Administered Medications:     ALPRAZolam, 0.25 mg, Oral, TID PRN    chlordiazepoxide, 25 mg, Oral, Q1H PRN    diphenhydrAMINE, 25 mg, Oral, Nightly PRN    melatonin, 9 mg, Oral, Nightly PRN    ondansetron, 4 mg, Intravenous, Q6H PRN    sodium chloride 0.9%, 10 mL, Intravenous, PRN    Flushing PICC/Midline Protocol, , , Until Discontinued **AND** sodium chloride 0.9%, 10 mL, Intravenous, Q6H **AND** sodium chloride 0.9%, 10 mL, Intravenous, PRN    Flushing  PICC/Midline Protocol, , , Until Discontinued **AND** sodium chloride 0.9%, 10 mL, Intravenous, Q6H **AND** sodium chloride 0.9%, 10 mL, Intravenous, PRN        All diagnosis and differential diagnosis have been reviewed; assessment and plan has been documented; I have personally reviewed the labs and test results that are presently available; I have reviewed the patients medication list; I have reviewed the consulting providers response and recommendations. I have reviewed or attempted to review medical records based upon their availability.    All of the patient and family questions have been addressed and answered. Patient's is agreeable to the above stated plan. I will continue to monitor closely and make adjustments to medical management as needed.    If patient was admitted under observational status it is with my approval/permission.      Fauzia James DO  Department of Hospital Medicine  [facility]  09/01/2024

## 2024-09-01 NOTE — PROGRESS NOTES
[facility] -     Cardiology  Progress Note    Patient Name: Britton Perez  MRN: [id number]  Admission Date: 8/31/2024  Hospital Length of Stay: 1 days  Code Status: Full Code   Attending Provider: Fauzia James DO   Consulting Provider: NESSA Glynn  Primary Care Physician: Kike Mulelr MD  Principal Problem:<principal problem not specified>    Patient information was obtained from patient, past medical records, and ER records.     Subjective:     Chief Complaint/Reason for Consult:     HPI:   Mr. Perez is a 35 y/o male, known to [facility] through hospital consult only, who presented to ED post SICD discharge x 2. He is currently incarcerated since 08.29.24 and is going through drug withdrawls- last use of heroin and xanax 3 days prior to admit. He was found to be bradycardia with HR in the 30s and was given atropine x 1. He then went into [facility] with subsequent ICD discharge and was placed on a lidocaine drip. Lab significant for Mg 1.40. He has been admitted to ICU. [facility] has been consulted due to ICD discharge.     Hospital Course:  9.1.24: Awake in bed. NAD. SB 38 bpm at rest. Asymptomatic. Lidocaine drip off. No further AICD discharges since admit      PMH: Long QT syndrome, VT storm, Hypertension, Hepatitis C, HIV, Polysubstance Abuse, PTSD, Seizures, Neuropathy, Old MI, Depression, Cardiac Arrest/ICD, NICMO, Mycotic aneurysm  PSH:  LHC,  SICD ([employer]), Right and left sided ICD Placement & Removal due to infections,  Tracheostomy with Removal, Chest Tube Insertion/Removal  Family History: None  Social History: Tobacco- Active Use, Positive for Polysubstance Abuse- IV Methamphetamine Use, Alcohol- Negative     Previous Cardiac Diagnostics:   Echocardiogram (1.22.24):  Left Ventricle: The left ventricle is normal in size. Normal wall thickness. Normal wall motion. There is normal systolic function with a visually estimated ejection fraction of 55 - 60%. There is normal  diastolic function.  Right Ventricle: Normal right ventricular cavity size. Systolic function is normal. TAPSE is 2.04 cm.     Echocardiogram (8.15.21):  RVSP ~ 30mmHg   Normal left ventricle cavity size. Normal wall thickness. Mild to   moderately (40-45%) decreased ejection fraction.   Moderate mitral regurgitation.   Mild to moderate tricuspid regurgitation. Mild pulmonary hypertension present.      Coronary Angiogram (8.30.19):  Angiographically normal coronary arteries.  LV end diastolic pressure is normal.    Review of patient's allergies indicates:   Allergen Reactions    Penicillins      No current facility-administered medications on file prior to encounter.     Current Outpatient Medications on File Prior to Encounter   Medication Sig    albuterol (PROVENTIL/VENTOLIN HFA) 90 mcg/actuation inhaler Inhale 1 puff into the lungs as needed.    BIKTARVY -25 mg per tablet Take 1 tablet by mouth once daily.    buprenorphine-naloxone (SUBOXONE) 8-2 mg Film Place 1 each under the tongue once daily.     busPIRone (BUSPAR) 30 MG Tab Take 1 tablet by mouth 2 (two) times daily.    lidocaine (LIDODERM) 5 % Place 1 patch onto the skin once daily.    LORazepam (ATIVAN) 0.5 MG tablet Take 1 tablet (0.5 mg total) by mouth 3 (three) times daily as needed (withdrawal symptoms).    sulfamethoxazole-trimethoprim 800-160mg (BACTRIM DS) 800-160 mg Tab Take 1 tablet by mouth once daily.    thiamine 100 MG tablet Take 1 tablet (100 mg total) by mouth once daily.         Review of Systems   Respiratory: Negative.     Cardiovascular: Negative.    Gastrointestinal:  Negative for nausea.   Genitourinary: Negative.    Musculoskeletal: Negative.    Neurological: Negative.    Psychiatric/Behavioral: Negative.         Objective:     Vital Signs (Most Recent):  Temp: 98.5 °F (36.9 °C) (09/01/24 0000)  Pulse: (!) 38 (09/01/24 0200)  Resp: 20 (09/01/24 0200)  BP: 128/69 (09/01/24 0200)  SpO2: 95 % (09/01/24 1057) Vital Signs (24h  Range):  Temp:  [98.1 °F (36.7 °C)-98.5 °F (36.9 °C)] 98.5 °F (36.9 °C)  Pulse:  [38-49] 38  Resp:  [12-25] 20  SpO2:  [94 %-97 %] 95 %  BP: (110-134)/(47-72) 128/69   Weight: 59 kg (130 lb)  Body mass index is 16.69 kg/m².  SpO2: 95 %       Intake/Output Summary (Last 24 hours) at 9/1/2024 1057  Last data filed at 8/31/2024 1739  Gross per 24 hour   Intake 167.51 ml   Output --   Net 167.51 ml     Lines/Drains/Airways       Peripherally Inserted Central Catheter Line  Duration             PICC Triple Lumen 08/31/24 0958 right brachial 1 day                  Significant Labs:   Chemistries:   Recent Labs   Lab 08/31/24 0427 09/01/24  0407    140   K 4.2 3.9   * 105   CO2 16* 25   BUN 12.4 10.7   CREATININE 0.80 0.76   CALCIUM 9.6 9.2   BILITOT 0.6 0.4   ALKPHOS 70 68   ALT 8 8   AST 17 12   GLUCOSE 102* 118*   MG 1.40* 2.40   PHOS  --  2.7   TROPONINI <0.010  --         CBC/Anemia Labs: Coags:    Recent Labs   Lab 08/31/24 0427 09/01/24  0407   WBC 9.23 9.13   HGB 14.8 13.7*   HCT 42.4 38.6*    179   MCV 85.7 83.5   RDW 12.7 12.9    Recent Labs   Lab 08/31/24  0542   INR 1.1        Significant Imaging:  Imaging Results              X-Ray Chest 1 View (Final result)  Result time 08/31/24 09:21:22      Final result by Julien Gil MD (08/31/24 09:21:22)                   Impression:      1. No evidence of lobar type consolidation or acute cardiac decompensation noted.      Electronically signed by: Julien Gil MD  Date:    08/31/2024  Time:    09:21               Narrative:    EXAMINATION:  XR CHEST 1 VIEW    CLINICAL HISTORY:  Chest pain.    TECHNIQUE:  1 view of the chest.    COMPARISON:  01/22/2024    FINDINGS:  The lungs demonstrate no evidence of lobar type consolidation, visible pneumothorax, or pleural fluid.  AICD device present.  The cardiac silhouette is within normal limits for size.   No acute displaced fracture is seen.                                    EKG:           Physical  Exam  Cardiovascular:      Rate and Rhythm: Regular rhythm. Bradycardia present.      Heart sounds: Normal heart sounds.   Pulmonary:      Effort: Pulmonary effort is normal.      Breath sounds: Normal breath sounds.   Abdominal:      Palpations: Abdomen is soft.   Musculoskeletal:         General: Normal range of motion.   Skin:     General: Skin is warm.   Neurological:      General: No focal deficit present.      Mental Status: He is alert.       Home Medications:   No current facility-administered medications on file prior to encounter.     Current Outpatient Medications on File Prior to Encounter   Medication Sig Dispense Refill    albuterol (PROVENTIL/VENTOLIN HFA) 90 mcg/actuation inhaler Inhale 1 puff into the lungs as needed.  5    BIKTARVY -25 mg per tablet Take 1 tablet by mouth once daily.  0    buprenorphine-naloxone (SUBOXONE) 8-2 mg Film Place 1 each under the tongue once daily.       busPIRone (BUSPAR) 30 MG Tab Take 1 tablet by mouth 2 (two) times daily.      lidocaine (LIDODERM) 5 % Place 1 patch onto the skin once daily.  0    LORazepam (ATIVAN) 0.5 MG tablet Take 1 tablet (0.5 mg total) by mouth 3 (three) times daily as needed (withdrawal symptoms). 12 tablet 0    sulfamethoxazole-trimethoprim 800-160mg (BACTRIM DS) 800-160 mg Tab Take 1 tablet by mouth once daily. 30 tablet 11    thiamine 100 MG tablet Take 1 tablet (100 mg total) by mouth once daily.       Current Schedule Inpatient Medications:   buprenorphine HCL  8 mg Sublingual Daily    dolutegravir  50 mg Oral Daily    emtricitabine-tenofovir alafen  1 tablet Oral Daily    [START ON 9/2/2024] mupirocin   Nasal BID    sodium chloride 0.9%  10 mL Intravenous Q6H    sodium chloride 0.9%  10 mL Intravenous Q6H     Continuous Infusions:   LIDOcaine  2 mg/min Intravenous Continuous 15 mL/hr at 09/01/24 0330 2 mg/min at 09/01/24 0330     Assessment:   VF/SICD discharge  --[facility] SICD  --hx R & L AICD implant/explants due to  endocarditis  Bradycardia  Long QT syndrome  NICMO  --recovered EF per TTE 01.22.24: EF 55-60%  --Hx MI  --normal coronaries 2019  Polysubstance abuse with delirium tremors  --last use of heroin/xanax 3 days prior to admit  Hepatitis C  HIV  Hypomagnesemia  --supplementation ordered  Hx of R PCA Mycotic aneurysm  Hx R parieto-Occipital ICH      Plan:   DC lidocaine  Avoid QT prolonging medications  Could consider placement of Micra PPM prior to DC. Will consult EP on Tuesday to evalauate        NESSA Glynn  Cardiology  [facility] -   09/01/2024  I agree with the findings of the complexity of problems addressed and take responsibility for the plan's risks and complications. I approved the plan documented by Bonny Armstrong NP.     I

## 2024-09-01 NOTE — PROGRESS NOTES
[facility] - Emergency Dept  Pulmonary Critical Care Note    Patient Name: Britton Perez  MRN: [id number]  Admission Date: 8/31/2024  Hospital Length of Stay: 1 days  Code Status: Full Code  Attending Provider: Fauzia James DO  Primary Care Provider: Kike Muller MD     Subjective:     HPI:   Patient is a 34-year-old male with past medical history of polysubstance abuse (heroin, Xanax), MI, seizure, HIV, hepatitis-C, heart failure presents due to due to defibrillator ([employer]) shocked him 2 times.  Patient is currently incarcerated at [facility] since Thursday.  He reports that he is currently withdrawing from heroin use and Xanax use (3 bars/day) with last time he used being 3 days ago.  He reports nausea, vomiting, restless legs, and anxiety.  In the ED chest x-ray shows AICD implant in place otherwise unremarkable, CBC looks unremarkable.  Patient is afebrile, satting 99% on room air.  PT INR normal, negative BNP, troponin, H and H unremarkable.  EKG shows sinus bradycardia.  Patient received 1 mg atropine and was started on a lidocaine 2000 mg in D5W 250 mL infusion.      Hospital Course/Significant events:  08/31/2024 admit to ICU    24 Hour Interval History:  Doing well other than some mild nausea but overall improving. He only had some bigeminy overnight, no major arhythmias. Lidocaine infusion has been stopped. Remains bradycardic but does not want pacer.     Past Medical History:   Diagnosis Date    Cardiac arrest 08/2019    pulseless, AED required    Depression     Heart failure, type unknown     fr other facility medical record    Hepatitis C     HIV (human immunodeficiency virus infection)     HTN (hypertension)     IV drug user     Myocardial infarct, old     fr transfer medical records    Neuropathy     left foot    Overdose     heroine, meth    Pneumonia     Polysubstance abuse     heroin methadone    PTSD (post-traumatic stress disorder)     from prev medical record    Seizure  08/2019    POSSIBLE Seizure prior to cardiac arrest, unclear       Past Surgical History:   Procedure Laterality Date    CHEST TUBE INSERTION Bilateral     bilat when intubated in the past (removed)    CORONARY ANGIOGRAPHY N/A 8/30/2019    Procedure: ANGIOGRAM, CORONARY ARTERY;  Surgeon: Misbah Culp MD;  Location: [facility] CATH;  Service: Cardiology;  Laterality: N/A;    GASTROSTOMY TUBE PLACEMENT      fr other facility medical record    INSERTION OF PACEMAKER  2007    INSERTION, PACEMAKER, TEMPORARY TRANSVENOUS Right 1/22/2024    Procedure: Insertion, Pacemaker, Temporary Transvenous;  Surgeon: Thad Interiano MD;  Location: [facility] CATH LAB;  Service: Cardiology;  Laterality: Right;    LEFT HEART CATHETERIZATION Left 8/30/2019    Procedure: Left heart cath;  Surgeon: Misbah Culp MD;  Location: [facility] CATH;  Service: Cardiology;  Laterality: Left;    REMOVAL OF PACEMAKER  2014    REMOVAL OF PACEMAKER N/A 1/26/2024    Procedure: Removal Cardiac Pacemaker;  Surgeon: Rock Hargrove MD;  Location: [facility] CATH LAB;  Service: Cardiology;  Laterality: N/A;  removal of TVP    TRACHEOSTOMY      x2 in the past (removed/healed as of August 2019)       Social History     Socioeconomic History    Marital status: Single   Tobacco Use    Smoking status: Every Day   Substance and Sexual Activity    Drug use: Yes     Types: IV, Methamphetamines     Comment: heroine opiates           Current Outpatient Medications   Medication Instructions    albuterol (PROVENTIL/VENTOLIN HFA) 90 mcg/actuation inhaler 1 puff, Inhalation, As needed (PRN)    BIKTARVY -25 mg per tablet 1 tablet, Oral, Daily    buprenorphine-naloxone (SUBOXONE) 8-2 mg Film 1 each, Sublingual, Daily    busPIRone (BUSPAR) 30 MG Tab 1 tablet, Oral, 2 times daily    lidocaine (LIDODERM) 5 % 1 patch, Transdermal, Daily    LORazepam (ATIVAN) 0.5 mg, Oral, 3 times daily PRN    sulfamethoxazole-trimethoprim 800-160mg (BACTRIM DS) 800-160 mg Tab 1 tablet, Oral, Daily     thiamine 100 mg, Oral, Daily       Current Inpatient Medications   buprenorphine HCL  8 mg Sublingual Daily    dolutegravir  50 mg Oral Daily    emtricitabine-tenofovir alafen  1 tablet Oral Daily    [START ON 9/2/2024] mupirocin   Nasal BID    sodium chloride 0.9%  10 mL Intravenous Q6H    sodium chloride 0.9%  10 mL Intravenous Q6H       Current Intravenous Infusions   LIDOcaine  2 mg/min Intravenous Continuous 15 mL/hr at 09/01/24 0330 2 mg/min at 09/01/24 0330         Review of Systems   Constitutional:  Negative for chills and fever.   Eyes:  Negative for blurred vision and double vision.   Respiratory:  Negative for cough and shortness of breath.    Cardiovascular:  Negative for chest pain, palpitations and leg swelling.   Gastrointestinal:  Positive for nausea and vomiting. Negative for abdominal pain, constipation and diarrhea.   Genitourinary:  Negative for dysuria.   Musculoskeletal:  Negative for myalgias and neck pain.   Neurological:  Negative for dizziness and headaches.   Psychiatric/Behavioral:  The patient is nervous/anxious.         Restless legs          Objective:       Intake/Output Summary (Last 24 hours) at 9/1/2024 0957  Last data filed at 8/31/2024 1739  Gross per 24 hour   Intake 167.51 ml   Output --   Net 167.51 ml         Vital Signs (Most Recent):  Temp: 98.5 °F (36.9 °C) (09/01/24 0000)  Pulse: (!) 38 (09/01/24 0200)  Resp: 20 (09/01/24 0200)  BP: 128/69 (09/01/24 0200)  SpO2: 95 % (09/01/24 0700)  Body mass index is 16.69 kg/m².  Weight: 59 kg (130 lb) Vital Signs (24h Range):  Temp:  [98.1 °F (36.7 °C)-98.5 °F (36.9 °C)] 98.5 °F (36.9 °C)  Pulse:  [38-49] 38  Resp:  [12-25] 20  SpO2:  [94 %-97 %] 95 %  BP: (110-134)/(47-72) 128/69     Physical Exam  HENT:      Head: Normocephalic and atraumatic.      Mouth/Throat:      Mouth: Mucous membranes are moist.      Pharynx: Oropharynx is clear.   Eyes:      General: No scleral icterus.     Extraocular Movements: Extraocular movements  "intact.      Conjunctiva/sclera: Conjunctivae normal.      Pupils: Pupils are equal, round, and reactive to light.   Cardiovascular:      Rate and Rhythm: Normal rate and regular rhythm.      Pulses: Normal pulses.      Heart sounds: Normal heart sounds. No murmur heard.     No friction rub. No gallop.   Pulmonary:      Effort: No respiratory distress.      Breath sounds: No stridor. No wheezing, rhonchi or rales.   Chest:      Chest wall: No tenderness.   Abdominal:      General: Abdomen is flat. Bowel sounds are normal. There is no distension.      Palpations: Abdomen is soft. There is no mass.      Tenderness: There is no abdominal tenderness. There is no guarding or rebound.      Hernia: No hernia is present.   Musculoskeletal:         General: No swelling.   Skin:     General: Skin is warm and dry.      Capillary Refill: Capillary refill takes less than 2 seconds.      Coloration: Skin is not jaundiced.      Findings: No bruising.   Neurological:      General: No focal deficit present.      Mental Status: He is alert.           Lines/Drains/Airways       Peripherally Inserted Central Catheter Line  Duration             PICC Triple Lumen 08/31/24 0958 right brachial <1 day                    Significant Labs:    Lab Results   Component Value Date    WBC 9.13 09/01/2024    HGB 13.7 (L) 09/01/2024    HCT 38.6 (L) 09/01/2024    MCV 83.5 09/01/2024     09/01/2024           BMP  Lab Results   Component Value Date     09/01/2024    K 3.9 09/01/2024    CHLORIDE 104 10/01/2021    CO2 25 09/01/2024    BUN 10.7 09/01/2024    CREATININE 0.76 09/01/2024    CALCIUM 9.2 09/01/2024    AGAP 10.0 09/01/2024    ESTGFRAFRICA >105 10/01/2021    EGFRNONAA >105 09/10/2021         ABG  No results for input(s): "PH", "PO2", "PCO2", "HCO3", "POCBASEDEF" in the last 168 hours.        Significant Imaging:  I have reviewed the pertinent imaging within the past 24 hours.        Assessment/Plan:     Assessment  Ventricular " tachycardia secondary to polysubstance abuse, AICD implanted  Heroin/Xanax abuse      Plan  -patient stable for downgrade out of ICU  -continue HIV management per ID  -continue all other recs per cardiology         NESSA Haynes  Pulmonary Critical Care Medicine  [facility] - Emergency Dept  DOS: 09/01/2024

## 2024-09-02 VITALS
DIASTOLIC BLOOD PRESSURE: 59 MMHG | HEIGHT: 74 IN | BODY MASS INDEX: 16.68 KG/M2 | HEART RATE: 30 BPM | RESPIRATION RATE: 18 BRPM | TEMPERATURE: 98 F | SYSTOLIC BLOOD PRESSURE: 140 MMHG | OXYGEN SATURATION: 98 % | WEIGHT: 130 LBS

## 2024-09-02 LAB
OHS QRS DURATION: 86 MS
OHS QTC CALCULATION: 537 MS

## 2024-09-02 PROCEDURE — 25000003 PHARM REV CODE 250: Performed by: INTERNAL MEDICINE

## 2024-09-02 PROCEDURE — A4216 STERILE WATER/SALINE, 10 ML: HCPCS | Performed by: INTERNAL MEDICINE

## 2024-09-02 PROCEDURE — 93005 ELECTROCARDIOGRAM TRACING: CPT

## 2024-09-02 PROCEDURE — 93010 ELECTROCARDIOGRAM REPORT: CPT | Mod: ,,, | Performed by: INTERNAL MEDICINE

## 2024-09-02 PROCEDURE — 25000003 PHARM REV CODE 250

## 2024-09-02 PROCEDURE — 25000003 PHARM REV CODE 250: Performed by: GENERAL PRACTICE

## 2024-09-02 RX ORDER — BUPRENORPHINE HYDROCHLORIDE 8 MG/1
8 TABLET SUBLINGUAL 2 TIMES DAILY
Qty: 60 TABLET | Refills: 0 | Status: SHIPPED | OUTPATIENT
Start: 2024-09-02 | End: 2024-10-02

## 2024-09-02 RX ORDER — ALPRAZOLAM 0.5 MG/1
1 TABLET ORAL 3 TIMES DAILY PRN
Status: DISCONTINUED | OUTPATIENT
Start: 2024-09-02 | End: 2024-09-02 | Stop reason: HOSPADM

## 2024-09-02 RX ORDER — BUPRENORPHINE HYDROCHLORIDE 8 MG/1
8 TABLET SUBLINGUAL 2 TIMES DAILY
Status: DISCONTINUED | OUTPATIENT
Start: 2024-09-02 | End: 2024-09-02 | Stop reason: HOSPADM

## 2024-09-02 RX ADMIN — DOLUTEGRAVIR SODIUM 50 MG: 50 TABLET, FILM COATED ORAL at 08:09

## 2024-09-02 RX ADMIN — ALPRAZOLAM 0.25 MG: 0.25 TABLET ORAL at 08:09

## 2024-09-02 RX ADMIN — BUPRENORPHINE 8 MG: 8 TABLET SUBLINGUAL at 08:09

## 2024-09-02 RX ADMIN — SODIUM CHLORIDE, PRESERVATIVE FREE 10 ML: 5 INJECTION INTRAVENOUS at 05:09

## 2024-09-02 RX ADMIN — EMTRICITABINE AND TENOFOVIR ALAFENAMIDE 1 TABLET: 200; 25 TABLET ORAL at 08:09

## 2024-09-02 RX ADMIN — ALPRAZOLAM 1 MG: 0.5 TABLET ORAL at 11:09

## 2024-09-02 NOTE — NURSING
Nurses Note -- 4 Eyes      9/2/2024   5:13 AM      Skin assessed during: Transfer      [x] No Altered Skin Integrity Present    []Prevention Measures Documented      [] Yes- Altered Skin Integrity Present or Discovered   [] LDA Added if Not in Epic (Describe Wound)   [] New Altered Skin Integrity was Present on Admit and Documented in LDA   [] Wound Image Taken    Wound Care Consulted? No    Attending Nurse:  Tamra Boateng LPN    Second RN/Staff Member:   Eva Lowery LPN

## 2024-09-02 NOTE — NURSING
Nurses Note -- 4 Eyes      9/2/2024   8:54 AM      Skin assessed during: Q Shift Change      [x] No Altered Skin Integrity Present    []Prevention Measures Documented      [] Yes- Altered Skin Integrity Present or Discovered   [] LDA Added if Not in Epic (Describe Wound)   [] New Altered Skin Integrity was Present on Admit and Documented in LDA   [] Wound Image Taken    Wound Care Consulted? No    Attending Nurse:  Hanny Orellana RN/Staff Member:   Tamra

## 2024-09-02 NOTE — PROGRESS NOTES
[facility] -     Cardiology  Progress Note    Patient Name: Britton Perez  MRN: [id number]  Admission Date: 8/31/2024  Hospital Length of Stay: 2 days  Code Status: Full Code   Attending Provider: No att. providers found   Consulting Provider: Mel Lim NP  Primary Care Physician: Kike Muller MD  Principal Problem:Cardiac arrest    Patient information was obtained from patient, past medical records, and ER records.     Subjective:     Reason for Consult: ICD discharge     HPI: Mr. Perez is a 35 y/o male, known to [facility] through hospital consult only, who presented to ED post SICD discharge x 2. He is currently incarcerated since 08.29.24 and is going through drug withdrawls- last use of heroin and xanax 3 days prior to admit. He was found to be bradycardia with HR in the 30s and was given atropine x 1. He then went into [facility] with subsequent ICD discharge and was placed on a lidocaine drip. Lab significant for Mg 1.40. He has been admitted to ICU. [facility] has been consulted due to ICD discharge.     Hospital Course:  9.1.24: Awake in bed. NAD. SB 38 bpm at rest. Asymptomatic. Lidocaine drip off. No further AICD discharges since admit  9.2.24: NAD, VSS, He denies SOB, CP, or palpitations. No repeat ACID discharges      PMH: Long QT syndrome, VT storm, Hypertension, Hepatitis C, HIV, Polysubstance Abuse, PTSD, Seizures, Neuropathy, Old MI, Depression, Cardiac Arrest/ICD, NICMO, Mycotic aneurysm  PSH:  LHC,  SICD ([employer]), Right and left sided ICD Placement & Removal due to infections,  Tracheostomy with Removal, Chest Tube Insertion/Removal  Family History: None  Social History: Tobacco- Active Use, Positive for Polysubstance Abuse- IV Methamphetamine Use, Alcohol- Negative     Previous Cardiac Diagnostics:   Echocardiogram (1.22.24):  Left Ventricle: The left ventricle is normal in size. Normal wall thickness. Normal wall motion. There is normal systolic function with a visually  estimated ejection fraction of 55 - 60%. There is normal diastolic function.  Right Ventricle: Normal right ventricular cavity size. Systolic function is normal. TAPSE is 2.04 cm.     Echocardiogram (8.15.21):  RVSP ~ 30mmHg   Normal left ventricle cavity size. Normal wall thickness. Mild to   moderately (40-45%) decreased ejection fraction.   Moderate mitral regurgitation.   Mild to moderate tricuspid regurgitation. Mild pulmonary hypertension present.      Coronary Angiogram (8.30.19):  Angiographically normal coronary arteries.  LV end diastolic pressure is normal.    Review of patient's allergies indicates:   Allergen Reactions    Penicillins      No current facility-administered medications on file prior to encounter.     Current Outpatient Medications on File Prior to Encounter   Medication Sig    albuterol (PROVENTIL/VENTOLIN HFA) 90 mcg/actuation inhaler Inhale 1 puff into the lungs as needed.    BIKTARVY -25 mg per tablet Take 1 tablet by mouth once daily.    busPIRone (BUSPAR) 30 MG Tab Take 1 tablet by mouth 2 (two) times daily.    lidocaine (LIDODERM) 5 % Place 1 patch onto the skin once daily.    LORazepam (ATIVAN) 0.5 MG tablet Take 1 tablet (0.5 mg total) by mouth 3 (three) times daily as needed (withdrawal symptoms).    sulfamethoxazole-trimethoprim 800-160mg (BACTRIM DS) 800-160 mg Tab Take 1 tablet by mouth once daily.    thiamine 100 MG tablet Take 1 tablet (100 mg total) by mouth once daily.    [DISCONTINUED] buprenorphine-naloxone (SUBOXONE) 8-2 mg Film Place 1 each under the tongue once daily.          Review of Systems   Respiratory: Negative.     Cardiovascular: Negative.    Gastrointestinal:  Negative for nausea and vomiting.   Genitourinary: Negative.    Musculoskeletal: Negative.    Neurological: Negative.    Psychiatric/Behavioral: Negative.         Objective:     Vital Signs (Most Recent):  Temp: 98.4 °F (36.9 °C) (09/02/24 0814)  Pulse: (!) 30 (09/02/24 0814)  Resp: 18 (09/02/24  1137)  BP: (!) 140/59 (09/02/24 0814)  SpO2: 98 % (09/02/24 1100) Vital Signs (24h Range):  Temp:  [97.5 °F (36.4 °C)-98.5 °F (36.9 °C)] 98.4 °F (36.9 °C)  Pulse:  [30-44] 30  Resp:  [18] 18  SpO2:  [95 %-99 %] 98 %  BP: (111-140)/(59-73) 140/59   Weight: 59 kg (130 lb)  Body mass index is 16.69 kg/m².  SpO2: 98 %       Intake/Output Summary (Last 24 hours) at 9/2/2024 1314  Last data filed at 9/1/2024 2300  Gross per 24 hour   Intake 228.42 ml   Output 500 ml   Net -271.58 ml     Lines/Drains/Airways       None                 Significant Labs:   Chemistries:   Recent Labs   Lab 08/31/24 0427 09/01/24  0407    140   K 4.2 3.9   * 105   CO2 16* 25   BUN 12.4 10.7   CREATININE 0.80 0.76   CALCIUM 9.6 9.2   BILITOT 0.6 0.4   ALKPHOS 70 68   ALT 8 8   AST 17 12   GLUCOSE 102* 118*   MG 1.40* 2.40   PHOS  --  2.7   TROPONINI <0.010  --         CBC/Anemia Labs: Coags:    Recent Labs   Lab 08/31/24 0427 09/01/24  0407   WBC 9.23 9.13   HGB 14.8 13.7*   HCT 42.4 38.6*    179   MCV 85.7 83.5   RDW 12.7 12.9    Recent Labs   Lab 08/31/24  0542   INR 1.1        Significant Imaging:  Imaging Results              X-Ray Chest 1 View (Final result)  Result time 08/31/24 09:21:22      Final result by Julien Gil MD (08/31/24 09:21:22)                   Impression:      1. No evidence of lobar type consolidation or acute cardiac decompensation noted.      Electronically signed by: Julien Gil MD  Date:    08/31/2024  Time:    09:21               Narrative:    EXAMINATION:  XR CHEST 1 VIEW    CLINICAL HISTORY:  Chest pain.    TECHNIQUE:  1 view of the chest.    COMPARISON:  01/22/2024    FINDINGS:  The lungs demonstrate no evidence of lobar type consolidation, visible pneumothorax, or pleural fluid.  AICD device present.  The cardiac silhouette is within normal limits for size.   No acute displaced fracture is seen.                                    EKG:           Physical Exam  HENT:      Head:  Normocephalic.      Mouth/Throat:      Mouth: Mucous membranes are moist.   Cardiovascular:      Rate and Rhythm: Regular rhythm. Bradycardia present.      Heart sounds: Normal heart sounds.   Pulmonary:      Effort: Pulmonary effort is normal.      Breath sounds: Normal breath sounds.   Abdominal:      General: Abdomen is flat.      Palpations: Abdomen is soft.   Musculoskeletal:         General: Normal range of motion.   Skin:     General: Skin is warm.   Neurological:      General: No focal deficit present.      Mental Status: He is alert.   Psychiatric:         Behavior: Behavior normal.       Home Medications:   No current facility-administered medications on file prior to encounter.     Current Outpatient Medications on File Prior to Encounter   Medication Sig Dispense Refill    albuterol (PROVENTIL/VENTOLIN HFA) 90 mcg/actuation inhaler Inhale 1 puff into the lungs as needed.  5    BIKTARVY -25 mg per tablet Take 1 tablet by mouth once daily.  0    busPIRone (BUSPAR) 30 MG Tab Take 1 tablet by mouth 2 (two) times daily.      lidocaine (LIDODERM) 5 % Place 1 patch onto the skin once daily.  0    LORazepam (ATIVAN) 0.5 MG tablet Take 1 tablet (0.5 mg total) by mouth 3 (three) times daily as needed (withdrawal symptoms). 12 tablet 0    sulfamethoxazole-trimethoprim 800-160mg (BACTRIM DS) 800-160 mg Tab Take 1 tablet by mouth once daily. 30 tablet 11    thiamine 100 MG tablet Take 1 tablet (100 mg total) by mouth once daily.      [DISCONTINUED] buprenorphine-naloxone (SUBOXONE) 8-2 mg Film Place 1 each under the tongue once daily.        Current Schedule Inpatient Medications:   buprenorphine HCL  8 mg Sublingual BID    dolutegravir  50 mg Oral Daily    emtricitabine-tenofovir alafen  1 tablet Oral Daily    mupirocin   Nasal BID    sodium chloride 0.9%  10 mL Intravenous Q6H    sodium chloride 0.9%  10 mL Intravenous Q6H     Continuous Infusions:      Assessment:   VF/SICD discharge  --[facility]  SICD  --hx R & L AICD implant/explants due to endocarditis  Bradycardia  Long QT syndrome  NICMO  --recovered EF per TTE 01.22.24: EF 55-60%  --Hx MI  --normal coronaries 2019  Polysubstance abuse with delirium tremors  --last use of heroin/xanax 3 days prior to admit  Hepatitis C  HIV  Hypomagnesemia  --supplementation ordered  Hx of R PCA Mycotic aneurysm  Hx R parieto-Occipital ICH      Plan:   DC lidocaine infusion   Avoid QT prolonging medications  No need for EP consult   Patient will need rehab prior to consideration for a PPM   Okay to discharge  from a cardiac standpoint       Mel Lim NP  Cardiology  [facility] -   09/02/2024    I agree with the findings of the complexity of problems addressed and take responsibility for the plan's risks and complications. I approved the plan documented by Mel Lim NP.

## 2024-09-02 NOTE — DISCHARGE SUMMARY
[facility] Hospital Medicine Discharge Summary    Admit Date: 8/31/2024  Discharge Date and Time: 9/2/202410:09 AM  Admitting Physician:  Team  Discharging Physician: Ata Garcia MD.  Primary Care Physician: Kike Muller MD  Consults: Cardiology    Discharge Diagnoses:  VFib/as ICD discharge: stable   history of right and left AICD implant/explant due to endocarditis   Bradycardia   Prolonged QT  Nonischemic cardiomyopathy   Polysubstance abuse  Hepatitis-C   HIV    Hospital Course:   Britton Perez is a 34 y.o. male who  has a past medical history of Cardiac arrest (08/2019), Depression, Heart failure, type unknown, Hepatitis C, HIV (human immunodeficiency virus infection), HTN (hypertension), IV drug user, Myocardial infarct, old, Neuropathy, Overdose, Pneumonia, Polysubstance abuse, PTSD (post-traumatic stress disorder), and Seizure (08/2019).. The patient presented to [facility] on 8/31/2024 with a primary complaint of due to his defibrillator shocking him 3 times.  Patient is currently incarcerated in senior living. Patient says that he is currently withdrawing from heroin use and Xanax use. Last use was 3 days ago prior to arrival.  Patient reports nausea vomiting restless legs and anxiety.  Labs and vitals otherwise looked unremarkable.  EKG showed sinus bradycardia and patient received 1 mg of atropine and was started on lidocaine drip and was admitted to the ICU.  Cardiology was consulted.  Diagnosed patient with Vfib and He was placed on iv lidocaine. His Vfib resolved. He was in sinus bradycardia. Avoid QT prolonging medications     Discussed with cardiology. Recommended patient to be drug free before they can place a pacemaker. Patient aware of this and not sure if he will stop doing iv drugs. He was anxious and was placed on prn xanax. Also placed on Suboxone 8 mg BID. He was afebrile. Eating well. Denied any SOB chest pain, fever or chills.     ECHOEF per TTE  01.22.24: EF 55-60%     Patient was discharged back to the [facility] and advised life style changes. He understands the risk of continuing iv drugs.     Pt was seen and examined on the day of discharge  Vitals:  VITAL SIGNS: 24 HRS MIN & MAX LAST   Temp  Min: 97.5 °F (36.4 °C)  Max: 98.5 °F (36.9 °C) 98.4 °F (36.9 °C)   BP  Min: 111/71  Max: 140/59 (!) 140/59   Pulse  Min: 30  Max: 44  (!) 30   Resp  Min: 18  Max: 18 18   SpO2  Min: 95 %  Max: 99 % 96 %       Physical Exam:  Heart Bradycardic  Lungs clear   Abdomen soft and non tender   Neuro: No FND      Procedures Performed: No admission procedures for hospital encounter.     Significant Diagnostic Studies: See Full reports for all details    Recent Labs   Lab 08/31/24 0427 09/01/24  0407   WBC 9.23 9.13   RBC 4.95 4.62*   HGB 14.8 13.7*   HCT 42.4 38.6*   MCV 85.7 83.5   MCH 29.9 29.7   MCHC 34.9 35.5   RDW 12.7 12.9    179   MPV 9.8 9.8       Recent Labs   Lab 08/31/24 0427 09/01/24  0407    140   K 4.2 3.9   * 105   CO2 16* 25   BUN 12.4 10.7   CREATININE 0.80 0.76   CALCIUM 9.6 9.2   MG 1.40* 2.40   ALBUMIN 3.7 3.8   ALKPHOS 70 68   ALT 8 8   AST 17 12   BILITOT 0.6 0.4        Microbiology Results (last 7 days)       ** No results found for the last 168 hours. **             Echo Saline Bubble? No; Ultrasound enhancing contrast? No    Left Ventricle: The left ventricle is mildly dilated. Normal wall   thickness. There is normal systolic function with a visually estimated   ejection fraction of 60 - 65%. There is normal diastolic function.    Right Ventricle: Normal right ventricular cavity size. Systolic   function is normal. TAPSE is 2.52 cm.    Left Atrium: Left atrium is dilated.    Aortic Valve: The aortic valve is a trileaflet valve.    Mitral Valve: There is mitral annular calcification present. There is   no stenosis. The mean pressure gradient across the mitral valve is 1 mmHg   at a heart rate of 41 bpm. There is mild  regurgitation.    Tricuspid Valve: There is moderate regurgitation.    Pulmonary Artery: The estimated pulmonary artery systolic pressure is   32 mmHg.    IVC/SVC: Normal venous pressure at 3 mmHg.         Medication List        START taking these medications      buprenorphine HCL 8 mg Subl  Commonly known as: SUBUTEX  Place 1 tablet (8 mg total) under the tongue 2 (two) times daily.  Replaces: buprenorphine-naloxone 8-2 mg 8-2 mg            CONTINUE taking these medications      albuterol 90 mcg/actuation inhaler  Commonly known as: PROVENTIL/VENTOLIN HFA     BIKTARVY -25 mg (25 kg or greater)  Generic drug: [id number]-vockloxv-xynexfn ala     busPIRone 30 MG Tab  Commonly known as: BUSPAR     LIDOcaine 5 %  Commonly known as: LIDODERM     LORazepam 0.5 MG tablet  Commonly known as: ATIVAN  Take 1 tablet (0.5 mg total) by mouth 3 (three) times daily as needed (withdrawal symptoms).     sulfamethoxazole-trimethoprim 800-160mg 800-160 mg Tab  Commonly known as: BACTRIM DS  Take 1 tablet by mouth once daily.     thiamine 100 MG tablet  Take 1 tablet (100 mg total) by mouth once daily.            STOP taking these medications      buprenorphine-naloxone 8-2 mg 8-2 mg  Commonly known as: SUBOXONE  Replaced by: buprenorphine HCL 8 mg Subl               Where to Get Your Medications        You can get these medications from any pharmacy    Bring a paper prescription for each of these medications  buprenorphine HCL 8 mg Subl          Explained in detail to the patient about the discharge plan, medications, and follow-up visits. Pt understands and agrees with the treatment plan  Discharge Disposition: Prison   Discharged Condition: stable  Diet-   Dietary Orders (From admission, onward)       Start     Ordered    09/01/24 0959  Diet Adult Regular  Diet effective now        Comments: Sprite please    09/01/24 0959                   Medications Per DC med rec  Activities as tolerated    For further questions contact  hospitalist office    Discharge time 33 minutes    For worsening symptoms, chest pain, shortness of breath, increased abdominal pain, high grade fever, stroke or stroke like symptoms, immediately go to the nearest Emergency Room or call 911 as soon as possible.      Ata Negro M.D, on 9/2/2024. at 10:09 AM.

## 2024-09-03 LAB — HIV1 RNA SERPL NAA+PROBE-LOG#: NOT DETECTED {LOG_COPIES}/ML

## 2024-09-04 LAB
AGE: 36
CD3+CD4+ CELLS # SPEC: 154 UNIT/L (ref 589–1505)
CD3+CD4+ CELLS NFR BLD: 9.7 %
HCV RNA SERPL NAA+PROBE-ACNC: NORMAL IU/ML
LYMPHOCYTES # BLD AUTO: 1587.56 X10(3)/MCL (ref 1260–5520)
LYMPHOCYTES NFR LN MANUAL: 17.2 % (ref 28–48)
LYMPHOMA - T-CELL MARKERS SPEC-IMP: ABNORMAL
WBC # BLD AUTO: 9230 /MM3 (ref 4500–11500)

## 2025-02-10 ENCOUNTER — HOSPITAL ENCOUNTER (INPATIENT)
Facility: HOSPITAL | Age: 35
LOS: 2 days | Discharge: LEFT BEFORE DISCHARGE | DRG: 558 | End: 2025-02-12
Attending: EMERGENCY MEDICINE | Admitting: STUDENT IN AN ORGANIZED HEALTH CARE EDUCATION/TRAINING PROGRAM
Payer: MEDICAID

## 2025-02-10 DIAGNOSIS — F15.90 AMPHETAMINE USE: Primary | ICD-10-CM

## 2025-02-10 DIAGNOSIS — R06.02 SHORTNESS OF BREATH: ICD-10-CM

## 2025-02-10 DIAGNOSIS — I63.9 CEREBROVASCULAR ACCIDENT (CVA), UNSPECIFIED MECHANISM: ICD-10-CM

## 2025-02-10 DIAGNOSIS — I63.9 CVA (CEREBRAL VASCULAR ACCIDENT): ICD-10-CM

## 2025-02-10 DIAGNOSIS — Z21 HIV INFECTION, UNSPECIFIED SYMPTOM STATUS: ICD-10-CM

## 2025-02-10 DIAGNOSIS — D72.829 LEUKOCYTOSIS, UNSPECIFIED TYPE: ICD-10-CM

## 2025-02-10 DIAGNOSIS — R60.9 SWELLING: ICD-10-CM

## 2025-02-10 DIAGNOSIS — R53.1 WEAKNESS: ICD-10-CM

## 2025-02-10 DIAGNOSIS — R07.9 CHEST PAIN, UNSPECIFIED TYPE: ICD-10-CM

## 2025-02-10 LAB
ALBUMIN SERPL-MCNC: 4 G/DL (ref 3.5–5)
ALBUMIN/GLOB SERPL: 1.3 RATIO (ref 1.1–2)
ALP SERPL-CCNC: 78 UNIT/L (ref 40–150)
ALT SERPL-CCNC: 121 UNIT/L (ref 0–55)
ANION GAP SERPL CALC-SCNC: 11 MEQ/L
APTT PPP: 27.6 SECONDS (ref 23.2–33.7)
AST SERPL-CCNC: 209 UNIT/L (ref 5–34)
BASOPHILS # BLD AUTO: 0.02 X10(3)/MCL
BASOPHILS NFR BLD AUTO: 0.1 %
BILIRUB SERPL-MCNC: 1 MG/DL
BNP BLD-MCNC: 26.7 PG/ML
BUN SERPL-MCNC: 9.4 MG/DL (ref 8.9–20.6)
CALCIUM SERPL-MCNC: 9.6 MG/DL (ref 8.4–10.2)
CHLORIDE SERPL-SCNC: 100 MMOL/L (ref 98–107)
CO2 SERPL-SCNC: 29 MMOL/L (ref 22–29)
CREAT SERPL-MCNC: 0.87 MG/DL (ref 0.72–1.25)
CREAT/UREA NIT SERPL: 11
EOSINOPHIL # BLD AUTO: 0.05 X10(3)/MCL (ref 0–0.9)
EOSINOPHIL NFR BLD AUTO: 0.3 %
ERYTHROCYTE [DISTWIDTH] IN BLOOD BY AUTOMATED COUNT: 12.9 % (ref 11.5–17)
ETHANOL SERPL-MCNC: 21 MG/DL
GFR SERPLBLD CREATININE-BSD FMLA CKD-EPI: >60 ML/MIN/1.73/M2
GLOBULIN SER-MCNC: 3.1 GM/DL (ref 2.4–3.5)
GLUCOSE SERPL-MCNC: 99 MG/DL (ref 74–100)
HCT VFR BLD AUTO: 41.5 % (ref 42–52)
HGB BLD-MCNC: 14.9 G/DL (ref 14–18)
IMM GRANULOCYTES # BLD AUTO: 0.09 X10(3)/MCL (ref 0–0.04)
IMM GRANULOCYTES NFR BLD AUTO: 0.6 %
INR PPP: 1.1
LYMPHOCYTES # BLD AUTO: 0.99 X10(3)/MCL (ref 0.6–4.6)
LYMPHOCYTES NFR BLD AUTO: 6.1 %
MCH RBC QN AUTO: 32.9 PG (ref 27–31)
MCHC RBC AUTO-ENTMCNC: 35.9 G/DL (ref 33–36)
MCV RBC AUTO: 91.6 FL (ref 80–94)
MONOCYTES # BLD AUTO: 0.98 X10(3)/MCL (ref 0.1–1.3)
MONOCYTES NFR BLD AUTO: 6.1 %
NEUTROPHILS # BLD AUTO: 13.97 X10(3)/MCL (ref 2.1–9.2)
NEUTROPHILS NFR BLD AUTO: 86.8 %
NRBC BLD AUTO-RTO: 0 %
PLATELET # BLD AUTO: 170 X10(3)/MCL (ref 130–400)
PMV BLD AUTO: 9.6 FL (ref 7.4–10.4)
POTASSIUM SERPL-SCNC: 3.3 MMOL/L (ref 3.5–5.1)
PROT SERPL-MCNC: 7.1 GM/DL (ref 6.4–8.3)
PROTHROMBIN TIME: 14 SECONDS (ref 12.5–14.5)
RBC # BLD AUTO: 4.53 X10(6)/MCL (ref 4.7–6.1)
SODIUM SERPL-SCNC: 140 MMOL/L (ref 136–145)
TROPONIN I SERPL-MCNC: 0.03 NG/ML (ref 0–0.04)
WBC # BLD AUTO: 16.1 X10(3)/MCL (ref 4.5–11.5)

## 2025-02-10 PROCEDURE — 96372 THER/PROPH/DIAG INJ SC/IM: CPT | Performed by: EMERGENCY MEDICINE

## 2025-02-10 PROCEDURE — 87040 BLOOD CULTURE FOR BACTERIA: CPT | Performed by: EMERGENCY MEDICINE

## 2025-02-10 PROCEDURE — 80053 COMPREHEN METABOLIC PANEL: CPT | Performed by: EMERGENCY MEDICINE

## 2025-02-10 PROCEDURE — 11000001 HC ACUTE MED/SURG PRIVATE ROOM

## 2025-02-10 PROCEDURE — 85610 PROTHROMBIN TIME: CPT | Performed by: EMERGENCY MEDICINE

## 2025-02-10 PROCEDURE — 82077 ASSAY SPEC XCP UR&BREATH IA: CPT | Performed by: EMERGENCY MEDICINE

## 2025-02-10 PROCEDURE — 63600175 PHARM REV CODE 636 W HCPCS: Performed by: EMERGENCY MEDICINE

## 2025-02-10 PROCEDURE — 85025 COMPLETE CBC W/AUTO DIFF WBC: CPT | Performed by: EMERGENCY MEDICINE

## 2025-02-10 PROCEDURE — 96375 TX/PRO/DX INJ NEW DRUG ADDON: CPT

## 2025-02-10 PROCEDURE — 96374 THER/PROPH/DIAG INJ IV PUSH: CPT

## 2025-02-10 PROCEDURE — 93010 ELECTROCARDIOGRAM REPORT: CPT | Mod: ,,, | Performed by: INTERNAL MEDICINE

## 2025-02-10 PROCEDURE — 99285 EMERGENCY DEPT VISIT HI MDM: CPT | Mod: 25

## 2025-02-10 PROCEDURE — 85730 THROMBOPLASTIN TIME PARTIAL: CPT | Performed by: EMERGENCY MEDICINE

## 2025-02-10 PROCEDURE — 93005 ELECTROCARDIOGRAM TRACING: CPT

## 2025-02-10 PROCEDURE — 84484 ASSAY OF TROPONIN QUANT: CPT | Performed by: EMERGENCY MEDICINE

## 2025-02-10 PROCEDURE — 25500020 PHARM REV CODE 255: Performed by: EMERGENCY MEDICINE

## 2025-02-10 PROCEDURE — 83880 ASSAY OF NATRIURETIC PEPTIDE: CPT | Performed by: EMERGENCY MEDICINE

## 2025-02-10 RX ORDER — ACETAMINOPHEN 10 MG/ML
1000 INJECTION, SOLUTION INTRAVENOUS ONCE
Status: COMPLETED | OUTPATIENT
Start: 2025-02-10 | End: 2025-02-10

## 2025-02-10 RX ORDER — CEFTRIAXONE 2 G/1
2 INJECTION, POWDER, FOR SOLUTION INTRAMUSCULAR; INTRAVENOUS
Status: DISCONTINUED | OUTPATIENT
Start: 2025-02-10 | End: 2025-02-10

## 2025-02-10 RX ORDER — CEFEPIME HYDROCHLORIDE 2 G/1
2 INJECTION, POWDER, FOR SOLUTION INTRAVENOUS
Status: COMPLETED | OUTPATIENT
Start: 2025-02-10 | End: 2025-02-11

## 2025-02-10 RX ORDER — DIPHENHYDRAMINE HYDROCHLORIDE 50 MG/ML
25 INJECTION INTRAMUSCULAR; INTRAVENOUS
Status: COMPLETED | OUTPATIENT
Start: 2025-02-10 | End: 2025-02-10

## 2025-02-10 RX ORDER — HALOPERIDOL 5 MG/ML
5 INJECTION INTRAMUSCULAR
Status: COMPLETED | OUTPATIENT
Start: 2025-02-10 | End: 2025-02-10

## 2025-02-10 RX ADMIN — DIPHENHYDRAMINE HYDROCHLORIDE 25 MG: 50 INJECTION INTRAMUSCULAR; INTRAVENOUS at 10:02

## 2025-02-10 RX ADMIN — HALOPERIDOL LACTATE 5 MG: 5 INJECTION, SOLUTION INTRAMUSCULAR at 10:02

## 2025-02-10 RX ADMIN — IOHEXOL 125 ML: 350 INJECTION, SOLUTION INTRAVENOUS at 11:02

## 2025-02-10 RX ADMIN — ACETAMINOPHEN 1000 MG: 10 INJECTION, SOLUTION INTRAVENOUS at 10:02

## 2025-02-11 LAB
ACCEPTIBLE SP GR UR QL: >1.05 (ref 1–1.03)
ALBUMIN SERPL-MCNC: 3.1 G/DL (ref 3.5–5)
ALBUMIN/GLOB SERPL: 1.1 RATIO (ref 1.1–2)
ALP SERPL-CCNC: 65 UNIT/L (ref 40–150)
ALT SERPL-CCNC: 81 UNIT/L (ref 0–55)
AMPHET UR QL SCN: POSITIVE
ANION GAP SERPL CALC-SCNC: 7 MEQ/L
AST SERPL-CCNC: 119 UNIT/L (ref 5–34)
BACTERIA #/AREA URNS AUTO: ABNORMAL /HPF
BARBITURATE SCN PRESENT UR: NEGATIVE
BASOPHILS # BLD AUTO: 0.02 X10(3)/MCL
BASOPHILS NFR BLD AUTO: 0.2 %
BENZODIAZ UR QL SCN: POSITIVE
BILIRUB SERPL-MCNC: 1.2 MG/DL
BILIRUB UR QL STRIP.AUTO: NEGATIVE
BSA FOR ECHO PROCEDURE: 1.75 M2
BUN SERPL-MCNC: 8.9 MG/DL (ref 8.9–20.6)
CALCIUM SERPL-MCNC: 8.5 MG/DL (ref 8.4–10.2)
CANNABINOIDS UR QL SCN: POSITIVE
CHLORIDE SERPL-SCNC: 103 MMOL/L (ref 98–107)
CHOLEST SERPL-MCNC: 100 MG/DL
CHOLEST/HDLC SERPL: 5 {RATIO} (ref 0–5)
CK SERPL-CCNC: 2405 U/L (ref 30–200)
CLARITY UR: CLEAR
CO2 SERPL-SCNC: 27 MMOL/L (ref 22–29)
COCAINE UR QL SCN: NEGATIVE
COLOR UR AUTO: YELLOW
CREAT SERPL-MCNC: 0.73 MG/DL (ref 0.72–1.25)
CREAT/UREA NIT SERPL: 12
CV ECHO LV RWT: 0.31 CM
E/A RATIO: 1.14
ECHO LV POSTERIOR WALL: 0.9 CM (ref 0.6–1.1)
EOSINOPHIL # BLD AUTO: 0.03 X10(3)/MCL (ref 0–0.9)
EOSINOPHIL NFR BLD AUTO: 0.2 %
ERYTHROCYTE [DISTWIDTH] IN BLOOD BY AUTOMATED COUNT: 12.9 % (ref 11.5–17)
EST. AVERAGE GLUCOSE BLD GHB EST-MCNC: 91.1 MG/DL
FENTANYL UR QL SCN: POSITIVE
FLUAV AG UPPER RESP QL IA.RAPID: NOT DETECTED
FLUBV AG UPPER RESP QL IA.RAPID: NOT DETECTED
FRACTIONAL SHORTENING: 20.7 % (ref 28–44)
GFR SERPLBLD CREATININE-BSD FMLA CKD-EPI: >60 ML/MIN/1.73/M2
GLOBULIN SER-MCNC: 2.9 GM/DL (ref 2.4–3.5)
GLUCOSE SERPL-MCNC: 92 MG/DL (ref 74–100)
GLUCOSE UR QL STRIP: NORMAL
HBA1C MFR BLD: 4.8 %
HCT VFR BLD AUTO: 36.7 % (ref 42–52)
HDLC SERPL-MCNC: 22 MG/DL (ref 35–60)
HGB BLD-MCNC: 13 G/DL (ref 14–18)
HGB UR QL STRIP: NEGATIVE
IMM GRANULOCYTES # BLD AUTO: 0.05 X10(3)/MCL (ref 0–0.04)
IMM GRANULOCYTES NFR BLD AUTO: 0.4 %
INTERVENTRICULAR SEPTUM: 0.9 CM (ref 0.6–1.1)
KETONES UR QL STRIP: NEGATIVE
LACTATE SERPL-SCNC: 1.4 MMOL/L (ref 0.5–2.2)
LDLC SERPL CALC-MCNC: 65 MG/DL (ref 50–140)
LEFT ATRIUM SIZE: 2.6 CM
LEFT CCA DIST DIAS: 16 CM/S
LEFT CCA DIST SYS: 78 CM/S
LEFT CCA PROX DIAS: 17 CM/S
LEFT CCA PROX SYS: 127 CM/S
LEFT ECA DIAS: 21 CM/S
LEFT ECA SYS: 134 CM/S
LEFT ICA DIST DIAS: 20 CM/S
LEFT ICA DIST SYS: 84 CM/S
LEFT ICA MID DIAS: 20 CM/S
LEFT ICA MID SYS: 71 CM/S
LEFT ICA PROX DIAS: 15 CM/S
LEFT ICA PROX SYS: 88 CM/S
LEFT INTERNAL DIMENSION IN SYSTOLE: 4.6 CM (ref 2.1–4)
LEFT VENTRICLE MASS INDEX: 112.4 G/M2
LEFT VENTRICULAR INTERNAL DIMENSION IN DIASTOLE: 5.8 CM (ref 3.5–6)
LEFT VENTRICULAR MASS: 203.5 G
LEFT VERTEBRAL DIAS: 0 CM/S
LEFT VERTEBRAL SYS: 64 CM/S
LEUKOCYTE ESTERASE UR QL STRIP: 25
LYMPHOCYTES # BLD AUTO: 1.59 X10(3)/MCL (ref 0.6–4.6)
LYMPHOCYTES NFR BLD AUTO: 12.5 %
MAGNESIUM SERPL-MCNC: 1.8 MG/DL (ref 1.6–2.6)
MCH RBC QN AUTO: 32.7 PG (ref 27–31)
MCHC RBC AUTO-ENTMCNC: 35.4 G/DL (ref 33–36)
MCV RBC AUTO: 92.2 FL (ref 80–94)
MDMA UR QL SCN: NEGATIVE
MONOCYTES # BLD AUTO: 1.11 X10(3)/MCL (ref 0.1–1.3)
MONOCYTES NFR BLD AUTO: 8.7 %
MUCOUS THREADS URNS QL MICRO: ABNORMAL /LPF
MV PEAK A VEL: 0.7 M/S
MV PEAK E VEL: 0.8 M/S
NEUTROPHILS # BLD AUTO: 9.92 X10(3)/MCL (ref 2.1–9.2)
NEUTROPHILS NFR BLD AUTO: 78 %
NITRITE UR QL STRIP: NEGATIVE
NRBC BLD AUTO-RTO: 0 %
OHS CV CAROTID RIGHT ICA EDV HIGHEST: 25
OHS CV CAROTID ULTRASOUND LEFT ICA/CCA RATIO: 1.13
OHS CV CAROTID ULTRASOUND RIGHT ICA/CCA RATIO: 0.99
OHS CV PV CAROTID LEFT HIGHEST CCA: 127
OHS CV PV CAROTID LEFT HIGHEST ICA: 88
OHS CV PV CAROTID RIGHT HIGHEST CCA: 144
OHS CV PV CAROTID RIGHT HIGHEST ICA: 113
OHS CV US CAROTID LEFT HIGHEST EDV: 20
OHS QRS DURATION: 92 MS
OHS QTC CALCULATION: 491 MS
OPIATES UR QL SCN: NEGATIVE
PCP UR QL: NEGATIVE
PH UR STRIP: 6.5 [PH]
PH UR: 6.5 [PH] (ref 3–11)
PLATELET # BLD AUTO: 163 X10(3)/MCL (ref 130–400)
PMV BLD AUTO: 9.4 FL (ref 7.4–10.4)
POTASSIUM SERPL-SCNC: 3.6 MMOL/L (ref 3.5–5.1)
PROT SERPL-MCNC: 6 GM/DL (ref 6.4–8.3)
PROT UR QL STRIP: ABNORMAL
RA PRESSURE ESTIMATED: 8 MMHG
RBC # BLD AUTO: 3.98 X10(6)/MCL (ref 4.7–6.1)
RBC #/AREA URNS AUTO: ABNORMAL /HPF
RIGHT CCA DIST DIAS: 13 CM/S
RIGHT CCA DIST SYS: 114 CM/S
RIGHT CCA PROX DIAS: 17 CM/S
RIGHT CCA PROX SYS: 144 CM/S
RIGHT ECA DIAS: 14 CM/S
RIGHT ECA SYS: 131 CM/S
RIGHT ICA DIST DIAS: 25 CM/S
RIGHT ICA DIST SYS: 75 CM/S
RIGHT ICA MID DIAS: 18 CM/S
RIGHT ICA MID SYS: 56 CM/S
RIGHT ICA PROX DIAS: 12 CM/S
RIGHT ICA PROX SYS: 113 CM/S
RIGHT VERTEBRAL DIAS: 13 CM/S
RIGHT VERTEBRAL SYS: 56 CM/S
RSV A 5' UTR RNA NPH QL NAA+PROBE: NOT DETECTED
SARS-COV-2 RNA RESP QL NAA+PROBE: NOT DETECTED
SODIUM SERPL-SCNC: 137 MMOL/L (ref 136–145)
SP GR UR STRIP.AUTO: >1.05 (ref 1–1.03)
SQUAMOUS #/AREA URNS LPF: ABNORMAL /HPF
TRICUSPID ANNULAR PLANE SYSTOLIC EXCURSION: 1.6 CM
TRIGL SERPL-MCNC: 66 MG/DL (ref 34–140)
TROPONIN I SERPL-MCNC: <0.01 NG/ML (ref 0–0.04)
TSH SERPL-ACNC: 1.15 UIU/ML (ref 0.35–4.94)
UROBILINOGEN UR STRIP-ACNC: NORMAL
VLDLC SERPL CALC-MCNC: 13 MG/DL
WBC # BLD AUTO: 12.72 X10(3)/MCL (ref 4.5–11.5)
WBC #/AREA URNS AUTO: ABNORMAL /HPF
Z-SCORE OF LEFT VENTRICULAR DIMENSION IN END DIASTOLE: 1.47
Z-SCORE OF LEFT VENTRICULAR DIMENSION IN END SYSTOLE: 3.14

## 2025-02-11 PROCEDURE — 84443 ASSAY THYROID STIM HORMONE: CPT

## 2025-02-11 PROCEDURE — 80307 DRUG TEST PRSMV CHEM ANLYZR: CPT | Performed by: PHYSICIAN ASSISTANT

## 2025-02-11 PROCEDURE — 25000003 PHARM REV CODE 250: Performed by: INTERNAL MEDICINE

## 2025-02-11 PROCEDURE — 86706 HEP B SURFACE ANTIBODY: CPT | Performed by: INTERNAL MEDICINE

## 2025-02-11 PROCEDURE — 63600175 PHARM REV CODE 636 W HCPCS: Performed by: EMERGENCY MEDICINE

## 2025-02-11 PROCEDURE — 87536 HIV-1 QUANT&REVRSE TRNSCRPJ: CPT | Performed by: INTERNAL MEDICINE

## 2025-02-11 PROCEDURE — 86708 HEPATITIS A ANTIBODY: CPT | Performed by: INTERNAL MEDICINE

## 2025-02-11 PROCEDURE — 86704 HEP B CORE ANTIBODY TOTAL: CPT | Performed by: INTERNAL MEDICINE

## 2025-02-11 PROCEDURE — 80053 COMPREHEN METABOLIC PANEL: CPT | Performed by: INTERNAL MEDICINE

## 2025-02-11 PROCEDURE — 92610 EVALUATE SWALLOWING FUNCTION: CPT

## 2025-02-11 PROCEDURE — 11000001 HC ACUTE MED/SURG PRIVATE ROOM

## 2025-02-11 PROCEDURE — 83735 ASSAY OF MAGNESIUM: CPT | Performed by: INTERNAL MEDICINE

## 2025-02-11 PROCEDURE — 97165 OT EVAL LOW COMPLEX 30 MIN: CPT

## 2025-02-11 PROCEDURE — 85025 COMPLETE CBC W/AUTO DIFF WBC: CPT | Performed by: INTERNAL MEDICINE

## 2025-02-11 PROCEDURE — 84484 ASSAY OF TROPONIN QUANT: CPT

## 2025-02-11 PROCEDURE — 25000003 PHARM REV CODE 250: Performed by: STUDENT IN AN ORGANIZED HEALTH CARE EDUCATION/TRAINING PROGRAM

## 2025-02-11 PROCEDURE — 82550 ASSAY OF CK (CPK): CPT | Performed by: INTERNAL MEDICINE

## 2025-02-11 PROCEDURE — 83036 HEMOGLOBIN GLYCOSYLATED A1C: CPT

## 2025-02-11 PROCEDURE — 0241U COVID/RSV/FLU A&B PCR: CPT

## 2025-02-11 PROCEDURE — 63600175 PHARM REV CODE 636 W HCPCS: Performed by: INTERNAL MEDICINE

## 2025-02-11 PROCEDURE — 81001 URINALYSIS AUTO W/SCOPE: CPT | Performed by: EMERGENCY MEDICINE

## 2025-02-11 PROCEDURE — 87086 URINE CULTURE/COLONY COUNT: CPT | Performed by: EMERGENCY MEDICINE

## 2025-02-11 PROCEDURE — 97161 PT EVAL LOW COMPLEX 20 MIN: CPT

## 2025-02-11 PROCEDURE — 83605 ASSAY OF LACTIC ACID: CPT | Performed by: EMERGENCY MEDICINE

## 2025-02-11 PROCEDURE — 80061 LIPID PANEL: CPT

## 2025-02-11 PROCEDURE — 25000003 PHARM REV CODE 250

## 2025-02-11 PROCEDURE — 80074 ACUTE HEPATITIS PANEL: CPT | Performed by: INTERNAL MEDICINE

## 2025-02-11 PROCEDURE — 86360 T CELL ABSOLUTE COUNT/RATIO: CPT | Performed by: INTERNAL MEDICINE

## 2025-02-11 RX ORDER — ACETAMINOPHEN 500 MG
1000 TABLET ORAL EVERY 6 HOURS PRN
Status: DISCONTINUED | OUTPATIENT
Start: 2025-02-11 | End: 2025-02-12 | Stop reason: HOSPADM

## 2025-02-11 RX ORDER — BISACODYL 10 MG/1
10 SUPPOSITORY RECTAL DAILY PRN
Status: DISCONTINUED | OUTPATIENT
Start: 2025-02-11 | End: 2025-02-12 | Stop reason: HOSPADM

## 2025-02-11 RX ORDER — HYDRALAZINE HYDROCHLORIDE 20 MG/ML
10 INJECTION INTRAMUSCULAR; INTRAVENOUS EVERY 6 HOURS PRN
Status: DISCONTINUED | OUTPATIENT
Start: 2025-02-11 | End: 2025-02-12 | Stop reason: HOSPADM

## 2025-02-11 RX ORDER — MUPIROCIN 20 MG/G
OINTMENT TOPICAL 2 TIMES DAILY
Status: DISCONTINUED | OUTPATIENT
Start: 2025-02-12 | End: 2025-02-12 | Stop reason: HOSPADM

## 2025-02-11 RX ORDER — NALOXONE HCL 0.4 MG/ML
0.4 VIAL (ML) INJECTION
Status: COMPLETED | OUTPATIENT
Start: 2025-02-11 | End: 2025-02-11

## 2025-02-11 RX ORDER — SODIUM CHLORIDE, SODIUM LACTATE, POTASSIUM CHLORIDE, CALCIUM CHLORIDE 600; 310; 30; 20 MG/100ML; MG/100ML; MG/100ML; MG/100ML
INJECTION, SOLUTION INTRAVENOUS CONTINUOUS
Status: DISCONTINUED | OUTPATIENT
Start: 2025-02-11 | End: 2025-02-12 | Stop reason: HOSPADM

## 2025-02-11 RX ORDER — NAPROXEN SODIUM 220 MG/1
81 TABLET, FILM COATED ORAL DAILY
Status: DISCONTINUED | OUTPATIENT
Start: 2025-02-11 | End: 2025-02-11

## 2025-02-11 RX ORDER — CALCIUM CARBONATE 200(500)MG
500 TABLET,CHEWABLE ORAL 3 TIMES DAILY PRN
Status: DISCONTINUED | OUTPATIENT
Start: 2025-02-11 | End: 2025-02-12 | Stop reason: HOSPADM

## 2025-02-11 RX ORDER — ASPIRIN 81 MG/1
81 TABLET ORAL DAILY
Status: DISCONTINUED | OUTPATIENT
Start: 2025-02-11 | End: 2025-02-12 | Stop reason: HOSPADM

## 2025-02-11 RX ORDER — HALOPERIDOL 2 MG/1
2 TABLET ORAL ONCE
Status: COMPLETED | OUTPATIENT
Start: 2025-02-11 | End: 2025-02-11

## 2025-02-11 RX ORDER — ATORVASTATIN CALCIUM 40 MG/1
40 TABLET, FILM COATED ORAL DAILY
Status: DISCONTINUED | OUTPATIENT
Start: 2025-02-11 | End: 2025-02-12 | Stop reason: HOSPADM

## 2025-02-11 RX ORDER — THIAMINE HCL 100 MG
100 TABLET ORAL DAILY
Status: DISCONTINUED | OUTPATIENT
Start: 2025-02-11 | End: 2025-02-12 | Stop reason: HOSPADM

## 2025-02-11 RX ORDER — POTASSIUM CHLORIDE 20 MEQ/1
40 TABLET, EXTENDED RELEASE ORAL 2 TIMES DAILY
Status: COMPLETED | OUTPATIENT
Start: 2025-02-11 | End: 2025-02-11

## 2025-02-11 RX ORDER — LABETALOL HYDROCHLORIDE 5 MG/ML
10 INJECTION, SOLUTION INTRAVENOUS EVERY 4 HOURS PRN
Status: DISCONTINUED | OUTPATIENT
Start: 2025-02-11 | End: 2025-02-12 | Stop reason: HOSPADM

## 2025-02-11 RX ORDER — BUSPIRONE HYDROCHLORIDE 15 MG/1
30 TABLET ORAL 2 TIMES DAILY
Status: DISCONTINUED | OUTPATIENT
Start: 2025-02-11 | End: 2025-02-12 | Stop reason: HOSPADM

## 2025-02-11 RX ORDER — ACETAMINOPHEN 325 MG/1
650 TABLET ORAL EVERY 6 HOURS PRN
Status: DISCONTINUED | OUTPATIENT
Start: 2025-02-11 | End: 2025-02-11

## 2025-02-11 RX ORDER — ALBUTEROL SULFATE 90 UG/1
1 INHALANT RESPIRATORY (INHALATION)
Status: DISCONTINUED | OUTPATIENT
Start: 2025-02-11 | End: 2025-02-12 | Stop reason: HOSPADM

## 2025-02-11 RX ORDER — SODIUM CHLORIDE 0.9 % (FLUSH) 0.9 %
10 SYRINGE (ML) INJECTION
Status: DISCONTINUED | OUTPATIENT
Start: 2025-02-11 | End: 2025-02-12 | Stop reason: HOSPADM

## 2025-02-11 RX ADMIN — BUSPIRONE HYDROCHLORIDE 30 MG: 15 TABLET ORAL at 10:02

## 2025-02-11 RX ADMIN — POTASSIUM CHLORIDE 40 MEQ: 1500 TABLET, EXTENDED RELEASE ORAL at 09:02

## 2025-02-11 RX ADMIN — HALOPERIDOL 2 MG: 2 TABLET ORAL at 10:02

## 2025-02-11 RX ADMIN — POTASSIUM CHLORIDE 40 MEQ: 1500 TABLET, EXTENDED RELEASE ORAL at 08:02

## 2025-02-11 RX ADMIN — ACETAMINOPHEN 1000 MG: 500 TABLET ORAL at 09:02

## 2025-02-11 RX ADMIN — CEFEPIME 2 G: 2 INJECTION, POWDER, FOR SOLUTION INTRAVENOUS at 12:02

## 2025-02-11 RX ADMIN — ATORVASTATIN CALCIUM 40 MG: 40 TABLET, FILM COATED ORAL at 09:02

## 2025-02-11 RX ADMIN — NALOXONE HYDROCHLORIDE 0.4 MG: 0.4 INJECTION, SOLUTION INTRAMUSCULAR; INTRAVENOUS; SUBCUTANEOUS at 12:02

## 2025-02-11 RX ADMIN — ASPIRIN 81 MG: 81 TABLET, COATED ORAL at 09:02

## 2025-02-11 RX ADMIN — SODIUM CHLORIDE, POTASSIUM CHLORIDE, SODIUM LACTATE AND CALCIUM CHLORIDE: 600; 310; 30; 20 INJECTION, SOLUTION INTRAVENOUS at 12:02

## 2025-02-11 RX ADMIN — BUSPIRONE HYDROCHLORIDE 30 MG: 15 TABLET ORAL at 08:02

## 2025-02-11 RX ADMIN — THIAMINE HCL TAB 100 MG 100 MG: 100 TAB at 09:02

## 2025-02-11 NOTE — PT/OT/SLP EVAL
[facility]  Speech Language Pathology Department  Clinical Swallow Evaluation    Patient Name:  Britton Perez   MRN:  [id number]    Recommendations     General recommendations:  Speech/Language and Cognitive Evaluation  Solid texture recommendation:  Regular Diet - IDDSI Level 7  Liquid consistency recommendation: Thin liquids - IDDSI Level 0   Medications: per patient preference  Swallow strategies/precautions: small bites/sips, slow rate, upright for PO intake, and assist with feeding as needed    History     Britton Perez is a/n 34 y.o. male with history of CVA requiring tracheostomy placement which has since been removed was admitted for left side weakness and speech difficulties. CT revealed encephalomalacia in the right frontal and parietal lobes with dystrophic calcifications. Pt passed swallow screen, however pt lethargic. SLP to evaluate.     Past Medical History:   Diagnosis Date    Cardiac arrest 08/2019    pulseless, AED required    Depression     Heart failure, type unknown     fr other facility medical record    Hepatitis C     HIV (human immunodeficiency virus infection)     HTN (hypertension)     IV drug user     Myocardial infarct, old     fr transfer medical records    Neuropathy     left foot    Overdose     heroine, meth    Pneumonia     Polysubstance abuse     heroin methadone    PTSD (post-traumatic stress disorder)     from prev medical record    Seizure 08/2019    POSSIBLE Seizure prior to cardiac arrest, unclear     Past Surgical History:   Procedure Laterality Date    CHEST TUBE INSERTION Bilateral     bilat when intubated in the past (removed)    CORONARY ANGIOGRAPHY N/A 8/30/2019    Procedure: ANGIOGRAM, CORONARY ARTERY;  Surgeon: Misbah Culp MD;  Location: [facility];  Service: Cardiology;  Laterality: N/A;    GASTROSTOMY TUBE PLACEMENT      fr other facility medical record    INSERTION OF PACEMAKER  2007    INSERTION, PACEMAKER, TEMPORARY TRANSVENOUS Right  1/22/2024    Procedure: Insertion, Pacemaker, Temporary Transvenous;  Surgeon: Thad Interiano MD;  Location: [facility] CATH LAB;  Service: Cardiology;  Laterality: Right;    LEFT HEART CATHETERIZATION Left 8/30/2019    Procedure: Left heart cath;  Surgeon: Misbah Culp MD;  Location: [facility] CATH;  Service: Cardiology;  Laterality: Left;    REMOVAL OF PACEMAKER  2014    REMOVAL OF PACEMAKER N/A 1/26/2024    Procedure: Removal Cardiac Pacemaker;  Surgeon: Rock Hargrove MD;  Location: [facility] CATH LAB;  Service: Cardiology;  Laterality: N/A;  removal of TVP    TRACHEOSTOMY      x2 in the past (removed/healed as of August 2019)     Home diet texture/consistency: Regular and thin liquids  Current method of nutrition: NPO    Imaging   Results for orders placed during the hospital encounter of 08/31/24    X-Ray Chest 1 View    Narrative  EXAMINATION:  XR CHEST 1 VIEW    CLINICAL HISTORY:  Chest pain.    TECHNIQUE:  1 view of the chest.  COMPARISON:  01/22/2024  FINDINGS:  The lungs demonstrate no evidence of lobar type consolidation, visible pneumothorax, or pleural fluid.  AICD device present.  The cardiac silhouette is within normal limits for size.   No acute displaced fracture is seen.    Impression  1. No evidence of lobar type consolidation or acute cardiac decompensation noted.  Electronically signed by: Julien Gil MD  Date:    08/31/2024  Time:    09:21    Subjective     Patient awake and alert.  Spiritual/Cultural/Denominational Beliefs/Practices that affect care:  no  Pain/Comfort:  0/10    Objective     ORAL MUSCULATURE  Secretion Management: adequate  Mucosal Quality: good  Facial Movement: WFL  Mandibular Strength & Mobility: WFL  Oral Labial Strength & Mobility: WFL  Vocal Quality: adequate  Volitional Cough: Able to clear secretions    PO TRIALS  Consistency Fed By Oral Symptoms Pharyngeal Symptoms   Thin liquid by cup Self None None   Thin liquid by straw Self None None   Puree Self None None   Chewable solid  Self None None     Assessment     No signs/sx of aspiration observed.REC: regular diet with thin liquids. SLP to follow up x1.     Outcome Measures     Functional Oral Intake Scale: 7 - Total oral diet with no restrictions    Education     Patient provided with verbal education regarding POC.  Understanding was verbalized.    Plan     SLP Follow-Up:   2/12/25  Plan of Care reviewed with:  patient     Time Tracking     SLP Treatment Date:   02/11/25  Speech Start Time:  1000  Speech Stop Time:  1015     Speech Total Time (min):  15 min    Billable minutes:  Swallow and Oral Function Evaluation, 15 minutes     02/11/2025

## 2025-02-11 NOTE — ED PROVIDER NOTES
Encounter Date: 2/10/2025    SCRIBE #1 NOTE: I, Victoria Nilesh, am scribing for, and in the presence of,  Hannah Theodore MD. I have scribed the following portions of the note - Other sections scribed: HPI, ROS, PE.       History     Chief Complaint   Patient presents with    Cerebrovascular Accident     Pt arrives via [facility], EMS / Pt reports HX of heart attacks x 5, stroke x 2 (no deficits), pt is HIV +. Pt reports left sided weakness, left facial droop, Slurred speech starting approx 2 days ago. Pt reports Chest Pain and SOB since yesterday. Pt has bilateral lower leg pitting edema. Pt used meth yesterday, marijuana and etoh today.     Patient is a 34 year old male with a history of CVA x2, MI x5, HIV, and polysubstance abuse that presents to the ED via EMS for left-sided weakness onset 2 days ago. At this time, patient complains of left arm weakness and bilateral lower extremity weakness. He also complains of bilateral lower extremity edema and speech difficulty. He reports chest pain and shortness of breath yesterday. Notes he used meth yesterday. He admits to marijuana and alcohol use today. He denies visual disturbances.     The history is provided by the patient and the EMS personnel.     Review of patient's allergies indicates:   Allergen Reactions    Penicillins      Past Medical History:   Diagnosis Date    Cardiac arrest 08/2019    pulseless, AED required    Depression     Heart failure, type unknown     fr other facility medical record    Hepatitis C     HIV (human immunodeficiency virus infection)     HTN (hypertension)     IV drug user     Myocardial infarct, old     fr transfer medical records    Neuropathy     left foot    Overdose     heroine, meth    Pneumonia     Polysubstance abuse     heroin methadone    PTSD (post-traumatic stress disorder)     from prev medical record    Seizure 08/2019    POSSIBLE Seizure prior to cardiac arrest, unclear     Past Surgical History:   Procedure Laterality Date     CHEST TUBE INSERTION Bilateral     bilat when intubated in the past (removed)    CORONARY ANGIOGRAPHY N/A 8/30/2019    Procedure: ANGIOGRAM, CORONARY ARTERY;  Surgeon: Misbah Culp MD;  Location: [facility] CATH;  Service: Cardiology;  Laterality: N/A;    GASTROSTOMY TUBE PLACEMENT      fr other facility medical record    INSERTION OF PACEMAKER  2007    INSERTION, PACEMAKER, TEMPORARY TRANSVENOUS Right 1/22/2024    Procedure: Insertion, Pacemaker, Temporary Transvenous;  Surgeon: Thad Interiano MD;  Location: [facility] CATH LAB;  Service: Cardiology;  Laterality: Right;    LEFT HEART CATHETERIZATION Left 8/30/2019    Procedure: Left heart cath;  Surgeon: Misbah Culp MD;  Location: [facility] CATH;  Service: Cardiology;  Laterality: Left;    REMOVAL OF PACEMAKER  2014    REMOVAL OF PACEMAKER N/A 1/26/2024    Procedure: Removal Cardiac Pacemaker;  Surgeon: Rock Hargrove MD;  Location: [facility] CATH LAB;  Service: Cardiology;  Laterality: N/A;  removal of TVP    TRACHEOSTOMY      x2 in the past (removed/healed as of August 2019)     No family history on file.  Social History     Tobacco Use    Smoking status: Every Day   Substance Use Topics    Drug use: Yes     Types: IV, Methamphetamines     Comment: heroine opiates     Review of Systems   Eyes:  Negative for visual disturbance.   Respiratory:  Positive for shortness of breath.    Cardiovascular:  Positive for chest pain and leg swelling.   Neurological:  Positive for speech difficulty and weakness.   Psychiatric/Behavioral:          Meth, marijuana, and alcohol use.        Physical Exam     Initial Vitals [02/10/25 2049]   BP Pulse Resp Temp SpO2   116/73 109 20 98.7 °F (37.1 °C) 97 %      MAP       --         Physical Exam    Nursing note and vitals reviewed.  Constitutional: He is not diaphoretic.   HENT:   Head: Normocephalic and atraumatic.   Nose: Nose normal. Mouth/Throat: Oropharynx is clear and moist.   Eyes: Conjunctivae and EOM are normal. Pupils are equal, round,  and reactive to light.   Neck: Trachea normal. Neck supple.   Normal range of motion.  Cardiovascular:  Regular rhythm, normal heart sounds and intact distal pulses.   Tachycardia present.   Exam reveals no gallop and no friction rub.       No murmur heard.  Pulmonary/Chest: No respiratory distress. He has no wheezes. He has no rhonchi. He exhibits no tenderness.   Bibasilar crackles.    Abdominal: Abdomen is soft. Bowel sounds are normal. He exhibits no distension and no mass. There is no abdominal tenderness. There is no rebound and no guarding.   Musculoskeletal:      Cervical back: Normal range of motion and neck supple.      Lumbar back: Normal. No tenderness. Normal range of motion.      Comments: Bilateral lower extremity pitting edema, left greater than right.      Neurological: He is alert and oriented to person, place, and time.   Left upper extremity pronator drift. Minimal movement to bilateral lower extremities.    Skin: Skin is warm and dry. Capillary refill takes less than 2 seconds. No rash and no abscess noted. No erythema. No pallor.   Psychiatric:   Disheveled.          ED Course   Procedures  Labs Reviewed   COMPREHENSIVE METABOLIC PANEL - Abnormal       Result Value    Sodium 140      Potassium 3.3 (*)     Chloride 100      CO2 29      Glucose 99      Blood Urea Nitrogen 9.4      Creatinine 0.87      Calcium 9.6      Protein Total 7.1      Albumin 4.0      Globulin 3.1      Albumin/Globulin Ratio 1.3      Bilirubin Total 1.0      ALP 78       (*)      (*)     eGFR >60      Anion Gap 11.0      BUN/Creatinine Ratio 11     ALCOHOL,MEDICAL (ETHANOL) - Abnormal    Ethanol Level 21.0 (*)    CBC WITH DIFFERENTIAL - Abnormal    WBC 16.10 (*)     RBC 4.53 (*)     Hgb 14.9      Hct 41.5 (*)     MCV 91.6      MCH 32.9 (*)     MCHC 35.9      RDW 12.9      Platelet 170      MPV 9.6      Neut % 86.8      Lymph % 6.1      Mono % 6.1      Eos % 0.3      Basophil % 0.1      Imm Grans % 0.6       Neut # 13.97 (*)     Lymph # 0.99      Mono # 0.98      Eos # 0.05      Baso # 0.02      Imm Gran # 0.09 (*)     NRBC% 0.0     B-TYPE NATRIURETIC PEPTIDE - Normal    Natriuretic Peptide 26.7     APTT - Normal    PTT 27.6     PROTIME-INR - Normal    PT 14.0      INR 1.1     TROPONIN I - Normal    Troponin-I 0.031     BLOOD CULTURE OLG   BLOOD CULTURE [facility]LG   CBC W/ AUTO DIFFERENTIAL    Narrative:     The following orders were created for panel order CBC auto differential.  Procedure                               Abnormality         Status                     ---------                               -----------         ------                     CBC with Differential[[id number]]       Abnormal            Final result                 Please view results for these tests on the individual orders.   DRUG SCREEN, URINE (BEAKER)   URINALYSIS, REFLEX TO URINE CULTURE   LACTIC ACID, PLASMA          Imaging Results              CT Head Without Contrast (In process)                      CTA Head and Neck (xpd) (In process)                      CTA Chest Non-Coronary (PE Studies) (In process)                      X-Ray Chest AP Portable (Final result)  Result time 02/10/25 21:33:19      Final result by Ben Ramirez MD (02/10/25 21:33:19)                   Impression:      No acute cardiopulmonary process identified.      Electronically signed by: Ben Ramirez  Date:    02/10/2025  Time:    21:33               Narrative:    EXAMINATION:  XR CHEST AP PORTABLE    CLINICAL HISTORY:  shortness of braeth;    TECHNIQUE:  One view    COMPARISON:  August 31, 2024.    FINDINGS:  Cardiopericardial silhouette is within normal limits.  Left lower lateral chest implanted device electrode is in similar location.  No acute dense focal or segmental consolidation, congestive process, pleural effusions or pneumothorax.                                    X-Rays:   Independently Interpreted Readings:   Chest X-Ray: Normal heart size.  No  infiltrates.  No acute abnormalities.     Medications   ceFEPIme injection 2 g (has no administration in time range)   haloperidol lactate injection 5 mg (5 mg Intramuscular Given 2/10/25 2228)   diphenhydrAMINE injection 25 mg (25 mg Intravenous Given 2/10/25 2228)   acetaminophen 1,000 mg/100 mL (10 mg/mL) injection 1,000 mg (1,000 mg Intravenous New Bag 2/10/25 2236)   iohexoL (OMNIPAQUE 350) injection 125 mL (125 mLs Intravenous Given 2/10/25 2346)     Medical Decision Making  Differential diagnosis includes, but is not limited to, CVA, TIA, intracranial hemorrhage, CHF, and endocarditis. Pe, acs  Cbc, cmp, coags, trop, bnp, cultures, EKG, cxr, cta chest, ct head, cta head and neck ordered and reviewed  Leukocytosis may be due to amphetamine use or infection  Mild hypokalemia, trop and bnp wnl  Prior angio without cad  Out of window for thrombolytics adrian dmit    Problems Addressed:  Amphetamine use: chronic illness or injury with exacerbation, progression, or side effects of treatment  Cerebrovascular accident (CVA), unspecified mechanism: acute illness or injury that poses a threat to life or bodily functions  Chest pain, unspecified type: acute illness or injury that poses a threat to life or bodily functions  Leukocytosis, unspecified type: acute illness or injury that poses a threat to life or bodily functions  Shortness of breath: acute illness or injury that poses a threat to life or bodily functions    Amount and/or Complexity of Data Reviewed  Independent Historian: EMS  External Data Reviewed: labs and notes.     Details: Amphetamine use, cardiomyopathy with aicd  Labs: ordered.  Radiology: ordered and independent interpretation performed.  ECG/medicine tests: ordered and independent interpretation performed.    Risk  OTC drugs.  Prescription drug management.  Decision regarding hospitalization.      Additional MDM:     NIH Stroke Scale:   Interval = baseline (upon arrival/admit)  Level of  consciousness = 0 - alert  LOC questions = 0 - answers both correctly  LOC commands = 0 - performs both correctly  Best gaze = 0 - normal  Visual = 0 - no visual loss  Facial palsy = 0 - normal  Motor left arm =  1 - drift  Motor right arm =  0 - no drift  Motor left leg = 2 - can't resist gravity  Motor right leg =  2 - can't resist gravity  Limb ataxia = 0 - absent  Sensory = 0 - normal  Best language = 0 - no aphasia  Dysarthria = 1 - mild to moderate dysarthria  Extinction and inattention = 0 - no neglect  NIH Stroke Scale Total = 6           Scribe Attestation:   Scribe #1: I performed the above scribed service and the documentation accurately describes the services I performed. I attest to the accuracy of the note.  Comments: Attending:   Physician Attestation Statement for Scribe #1: I, Hannah Theodore MD, personally performed the services described in this documentation. All medical record entries made by the scribe were at my direction and in my presence.  I have reviewed the chart and agree that the record reflects my personal performance and is accurate and complete.        Attending Attestation:           Physician Attestation for Scribe:  Physician Attestation Statement for Scribe #1: IHannah MD, reviewed documentation, as scribed by Victoria Galloway in my presence, and it is both accurate and complete.             ED Course as of 02/10/25 2349   Mon Feb 10, 2025   2108 EKG obtained at 8:48 p.m. rate of 107 sinus tachycardia without acute ST or T-wave abnormality [BS]   2108 Due to history of HIV and IV drug use will obtain blood cultures in case there is concern for embolic stroke or endocarditis [BS]   2224 Pt restless, uncooperative, admitted to methamphteamine use yesterday. 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, rocephin treated  Also had h/o pseudomonas bacteremia, will cover potential with cefepime at this time [BS]   2341 Pt had prior embolization of occipital lobe [BS]   2341 Pt also has h/o ich [BS]   2345 Ct head reviewd, emblization material visualized wtihout acute intracranial hemorrhage [BS]   2347 Pt back in the room discussed plan to admit [BS]      ED Course User Index  [BS] Hannah Theodore MD                           Clinical Impression:  Final diagnoses:  [R06.02] Shortness of breath  [F15.90] Amphetamine use (Primary)  [I63.9] Cerebrovascular accident (CVA), unspecified mechanism  [R07.9] Chest pain, unspecified type  [D72.829] Leukocytosis, unspecified type                 Hannah Theodore MD  02/10/25 8925

## 2025-02-11 NOTE — PT/OT/SLP EVAL
Occupational Therapy   Evaluation and Discharge Note    Name: Britton Perez  MRN: [id number]    Recommendations:     Discharge therapy intensity: No Therapy Indicated   Discharge Equipment Recommendations: none  Barriers to discharge:  None    Assessment:     Britton Perez is a 34 y.o. male with a medical diagnosis of rhabdo 2/2 substance abuse, HIV+. On eval, patient presents at functional baseline. Skilled OT services are not warranted at this time.    Plan:     OT to sign off as acute OT services are not warranted at this time.  Please re-consult if situation changes during this hospitalization.    Plan of Care Reviewed with: patient, significant other    Subjective     Chief Complaint: none stated  Patient/Family Comments/goals: to go home    Occupational Profile:  Living Environment: lives with girlfriend in a [facility], no HA; walk in shower  Previous level of function: ind  Roles and Routines: girlfriend  Equipment Used at Home: none  Assistance upon Discharge: girlfriend    Pain/Comfort:  Pain Rating 1: 0/10    Patients cultural, spiritual, Congregation conflicts given the current situation: no    Objective:     OT communicated with NSG prior to session.      Patient was found HOB elevated with peripheral IV upon OT entry to room.    General Precautions: Standard  Orthopedic Precautions: N/A  Braces: N/A    Vital Signs: Respiratory Status: on room air    Bed Mobility:    Patient completed Supine to Sit with independence  Patient completed Sit to Supine with independence    Functional Mobility/Transfers:  Patient completed Sit <> Stand Transfer with independence  with  no assistive device   Patient completed Toilet Transfer Step Transfer technique with independence with  no AD    Activities of Daily Living:  ind    AMPAC 6 Click ADL:  AMPAC Total Score: 24    Functional Cognition:  Orientation: oriented to Person, Place, Time, and Situation    Visual Perceptual Skills:  Intact    Upper Extremity Function:  Right Upper  Extremity:   WFL    Left Upper Extremity:  Slight shoulder flexion weakness, not impairing function at this time    Therapeutic Positioning  Risk for acquired pressure injuries is decreased due to intact sensation, continence, and ability to mobilize independently .    OT interventions performed during the course of today's session in an effort to prevent and/or reduce acquired pressure injuries:   Education was provided on benefits of and recommendations for therapeutic positioning    Skin assessment: all bony prominences were assessed    Findings: no redness or breakdown noted    OT recommendations for therapeutic positioning throughout hospitalization:   Follow [facility] Pressure Injury Prevention Protocol    Patient Education:  Patient and significant other were provided with verbal education education regarding OT role/goals/POC, fall prevention, safety awareness, Discharge/DME recommendations, and pressure ulcer prevention.  Understanding was verbalized.     Patient left supine with all lines intact, call button in reach, NSG notified, and s/o present.    History:     Past Medical History:   Diagnosis Date    Cardiac arrest 08/2019    pulseless, AED required    Depression     Heart failure, type unknown     fr other facility medical record    Hepatitis C     HIV (human immunodeficiency virus infection)     HTN (hypertension)     IV drug user     Myocardial infarct, old     fr transfer medical records    Neuropathy     left foot    Overdose     heroine, meth    Pneumonia     Polysubstance abuse     heroin methadone    PTSD (post-traumatic stress disorder)     from prev medical record    Seizure 08/2019    POSSIBLE Seizure prior to cardiac arrest, unclear         Past Surgical History:   Procedure Laterality Date    CHEST TUBE INSERTION Bilateral     bilat when intubated in the past (removed)    CORONARY ANGIOGRAPHY N/A 8/30/2019    Procedure: ANGIOGRAM, CORONARY ARTERY;  Surgeon: Misbah Culp MD;  Location: [facility]  CATH;  Service: Cardiology;  Laterality: N/A;    GASTROSTOMY TUBE PLACEMENT      fr other facility medical record    INSERTION OF PACEMAKER  2007    INSERTION, PACEMAKER, TEMPORARY TRANSVENOUS Right 1/22/2024    Procedure: Insertion, Pacemaker, Temporary Transvenous;  Surgeon: Thad Interiano MD;  Location: [facility] CATH LAB;  Service: Cardiology;  Laterality: Right;    LEFT HEART CATHETERIZATION Left 8/30/2019    Procedure: Left heart cath;  Surgeon: Misbah Culp MD;  Location: [facility] CATH;  Service: Cardiology;  Laterality: Left;    REMOVAL OF PACEMAKER  2014    REMOVAL OF PACEMAKER N/A 1/26/2024    Procedure: Removal Cardiac Pacemaker;  Surgeon: Rock Hargrove MD;  Location: [facility] CATH LAB;  Service: Cardiology;  Laterality: N/A;  removal of TVP    TRACHEOSTOMY      x2 in the past (removed/healed as of August 2019)       Time Tracking:     OT Date of Treatment:    OT Start Time: 1604  OT Stop Time: 1614  OT Total Time (min): 10 min    Billable Minutes:Evaluation low    2/11/2025

## 2025-02-11 NOTE — PT/OT/SLP EVAL
Physical Therapy Evaluation and Discharge Note    Patient Name:  Britton Perez   MRN:  [id number]    Recommendations:     Discharge Recommendations: No Therapy Indicated  Discharge Equipment Recommendations: none   Barriers to discharge: None    Assessment:     Britton Perez is a 34 y.o. male admitted with left sided weakness .  At this time, patient is functioning at their prior level of function and does not require further acute PT services.     Recent Surgery: * No surgery found *      Plan:     During this hospitalization, patient does not require further acute PT services.  Please re-consult if situation changes.      Subjective     Chief Complaint: pain  Patient/Family Comments/goals: none  Pain/Comfort:  Pain Rating 1: 5/10  Location 1: chest  Pain Addressed 1: Distraction, Reposition  Pain Rating Post-Intervention 1: 5/10    Patients cultural, spiritual, Moravian conflicts given the current situation: no    Living Environment:  Lives with girlfriend in a [facility].   Prior to admission, patients level of function was independent.  Equipment used at home: none.  DME owned (not currently used): none.  Upon discharge, patient will have assistance from girlfriend.    Objective:     Communicated with nurse prior to session.  Patient found supine with telemetry, blood pressure cuff upon PT entry to room.    General Precautions: Standard,      Orthopedic Precautions:N/A   Braces: N/A  Respiratory Status: Room air    Exams:  Cognitive Exam:  Patient is oriented to Person, Place, Time, and Situation  Sensation: -       Intact  RLE ROM: WFL  RLE Strength: WFL  LLE ROM: WFL  LLE Strength: WFL    Functional Mobility:  Bed Mobility:  Supine to Sit: independence  Sit to Supine: independence  Transfers:  Sit to Stand:  independence with no AD  Gait: 30 ft independently   Balance: good    AM-PAC 6 CLICK MOBILITY  Total Score:24     Education Provided:  Role and goals of PT, transfer training, bed mobility, gait training, balance  training, safety awareness, assistive device, strengthening exercises, and importance of participating in PT to return to PLOF.    AM-PAC 6 CLICK MOBILITY  Total Score:24     Patient left supine with all lines intact, call button in reach, and nurse notified.    GOALS:   Multidisciplinary Problems       Physical Therapy Goals       Not on file                      History:     Past Medical History:   Diagnosis Date    Cardiac arrest 08/2019    pulseless, AED required    Depression     Heart failure, type unknown     fr other facility medical record    Hepatitis C     HIV (human immunodeficiency virus infection)     HTN (hypertension)     IV drug user     Myocardial infarct, old     fr transfer medical records    Neuropathy     left foot    Overdose     heroine, meth    Pneumonia     Polysubstance abuse     heroin methadone    PTSD (post-traumatic stress disorder)     from prev medical record    Seizure 08/2019    POSSIBLE Seizure prior to cardiac arrest, unclear       Past Surgical History:   Procedure Laterality Date    CHEST TUBE INSERTION Bilateral     bilat when intubated in the past (removed)    CORONARY ANGIOGRAPHY N/A 8/30/2019    Procedure: ANGIOGRAM, CORONARY ARTERY;  Surgeon: Misbah Culp MD;  Location: [facility] CATH;  Service: Cardiology;  Laterality: N/A;    GASTROSTOMY TUBE PLACEMENT      fr other facility medical record    INSERTION OF PACEMAKER  2007    INSERTION, PACEMAKER, TEMPORARY TRANSVENOUS Right 1/22/2024    Procedure: Insertion, Pacemaker, Temporary Transvenous;  Surgeon: Thad Interiano MD;  Location: [facility] CATH LAB;  Service: Cardiology;  Laterality: Right;    LEFT HEART CATHETERIZATION Left 8/30/2019    Procedure: Left heart cath;  Surgeon: Misbah Culp MD;  Location: [facility] CATH;  Service: Cardiology;  Laterality: Left;    REMOVAL OF PACEMAKER  2014    REMOVAL OF PACEMAKER N/A 1/26/2024    Procedure: Removal Cardiac Pacemaker;  Surgeon: Rock Hargrove MD;  Location: [facility] CATH LAB;   Service: Cardiology;  Laterality: N/A;  removal of TVP    TRACHEOSTOMY      x2 in the past (removed/healed as of August 2019)       Time Tracking:     PT Received On: 02/11/25  PT Start Time: 0720     PT Stop Time: 0730  PT Total Time (min): 10 min     Billable Minutes: Evaluation 10 minutes      02/11/2025

## 2025-02-11 NOTE — H&P
[facility] Medicine History & Physical Examination       Patient Name: Britton Perez  MRN: [id number]  Patient Class: IP- Inpatient   Admission Date: 2/10/2025   Admitting Physician: ANNE Service   Length of Stay: 0  Attending Physician: Faisal Means MD  Primary Care Provider: Denice, Primary Doctor  Face-to-Face encounter date: 02/11/2025  Code Status:Full  Chief Complaint: Cerebrovascular Accident (Pt arrives via [facility], EMS / Pt reports HX of heart attacks x 5, stroke x 2 (no deficits), pt is HIV +. Pt reports left sided weakness, left facial droop, Slurred speech starting approx 2 days ago. Pt reports Chest Pain and SOB since yesterday. Pt has bilateral lower leg pitting edema. Pt used meth yesterday, marijuana and etoh today.)      Screening for Social Drivers for health:  Patient screened for food insecurity, housing instability, transportation needs, utility difficulties, and interpersonal safety (select all that apply as identified as concern)  []Housing or Food  []Transportation Needs  []Utility Difficulties  []Interpersonal safety  [x]None      Patient information was obtained from patient, patient's family, past medical records and ER records.  ED records were reviewed in detail and documented below    HISTORY OF PRESENT ILLNESS:   Britton Perez is a 34 y.o. male w/ past medical history of essential hypertension, hx of NICMO, cardiac arrest/ICD, hx of prolonged QT syndrome, hx of endocarditis involving AICD, s/p AICD extraction and placement of SICD in 9/2021, IVDU, mycotic cerebral aneurysm with associated ICH, hepatitis-C s/p treatment in 2019, HIV, seizure disorder, PTSD, and depression, hx of CVA requiring tracheostomy placement which has since been removed with residual left side weakness       The patient presented to [facility] on 2/10/2025 with a primary complaint of weakness onset 2 days ago. States his girlfriend made him come to the hospital. Poor historian with  current mentation only answering questions with left words with difficulty to arouse. Mentions has chronic left sided weakness with worsening and generalized weakness. He also complains of bilateral lower extremity edema and speech difficulty. He reports chest pain and shortness of breath yesterday. Notes he used meth 2/9. He admits to marijuana and alcohol use 2/10. He denies visual disturbances.     ECHO 2/11 - normal systolic function with a visually estimated ejection fraction of 50 - 55%. Grade II diastolic dysfunction.   CK in 2000s, starting LR @75 cc hr given CHFpEF on ECHO    PAST MEDICAL HISTORY:     Past Medical History:   Diagnosis Date    Cardiac arrest 08/2019    pulseless, AED required    Depression     Heart failure, type unknown     fr other facility medical record    Hepatitis C     HIV (human immunodeficiency virus infection)     HTN (hypertension)     IV drug user     Myocardial infarct, old     fr transfer medical records    Neuropathy     left foot    Overdose     heroine, meth    Pneumonia     Polysubstance abuse     heroin methadone    PTSD (post-traumatic stress disorder)     from prev medical record    Seizure 08/2019    POSSIBLE Seizure prior to cardiac arrest, unclear       PAST SURGICAL HISTORY:     Past Surgical History:   Procedure Laterality Date    CHEST TUBE INSERTION Bilateral     bilat when intubated in the past (removed)    CORONARY ANGIOGRAPHY N/A 8/30/2019    Procedure: ANGIOGRAM, CORONARY ARTERY;  Surgeon: Misbah Culp MD;  Location: [facility] CATH;  Service: Cardiology;  Laterality: N/A;    GASTROSTOMY TUBE PLACEMENT      fr other facility medical record    INSERTION OF PACEMAKER  2007    INSERTION, PACEMAKER, TEMPORARY TRANSVENOUS Right 1/22/2024    Procedure: Insertion, Pacemaker, Temporary Transvenous;  Surgeon: Thad Interiano MD;  Location: [facility] CATH LAB;  Service: Cardiology;  Laterality: Right;    LEFT HEART CATHETERIZATION Left 8/30/2019    Procedure: Left heart cath;   Surgeon: Misbah Culp MD;  Location: [facility] CATH;  Service: Cardiology;  Laterality: Left;    REMOVAL OF PACEMAKER  2014    REMOVAL OF PACEMAKER N/A 1/26/2024    Procedure: Removal Cardiac Pacemaker;  Surgeon: Rock Hargrove MD;  Location: [facility] CATH LAB;  Service: Cardiology;  Laterality: N/A;  removal of TVP    TRACHEOSTOMY      x2 in the past (removed/healed as of August 2019)       ALLERGIES:   Penicillins    FAMILY HISTORY:   Reviewed and negative    SOCIAL HISTORY:     Social History     Tobacco Use    Smoking status: Every Day    Smokeless tobacco: Not on file   Substance Use Topics    Alcohol use: Not on file        HOME MEDICATIONS:     Prior to Admission medications    Medication Sig Start Date End Date Taking? Authorizing Provider   albuterol (PROVENTIL/VENTOLIN HFA) 90 mcg/actuation inhaler Inhale 1 puff into the lungs as needed. 5/24/19   Provider, Historical   BIKTARVY -25 mg per tablet Take 1 tablet by mouth once daily. 8/17/19   Provider, Historical   busPIRone (BUSPAR) 30 MG Tab Take 1 tablet by mouth 2 (two) times daily. 7/15/19   Provider, Historical   lidocaine (LIDODERM) 5 % Place 1 patch onto the skin once daily. 7/25/19   Provider, Historical   LORazepam (ATIVAN) 0.5 MG tablet Take 1 tablet (0.5 mg total) by mouth 3 (three) times daily as needed (withdrawal symptoms). 8/31/19 9/30/19  Jamarcus Jane MD   thiamine 100 MG tablet Take 1 tablet (100 mg total) by mouth once daily. 9/2/19   Jamarcus Jane MD       REVIEW OF SYSTEMS:   Except as documented, all other systems reviewed and negative     PHYSICAL EXAM:     VITAL SIGNS: 24 HRS MIN & MAX LAST   Temp  Min: 98.7 °F (37.1 °C)  Max: 98.7 °F (37.1 °C) 98.7 °F (37.1 °C)   BP  Min: 108/62  Max: 118/81 118/81   Pulse  Min: 101  Max: 112  101   Resp  Min: 19  Max: 25 (!) 25   SpO2  Min: 94 %  Max: 100 % 100 %     General appearance: Well-developed, well-nourished male in no apparent distress.  HENT: Atraumatic head. Moist  mucous membranes of oral cavity.  Eyes: Normal extraocular movements.   Neck: Supple.   Lungs: clear to auscultation  Heart: Regular rate and rhythm. S1 and S2 present with no murmurs/gallop/rub. No pedal edema. No JVD present.   Abdomen: Soft, non-distended, non-tender. No rebound tenderness/guarding. Bowel sounds are normal.   Extremities: No cyanosis, clubbing .edema  Skin: No Rash.   Neuro: Motor and sensory exams grossly intact. Good tone.   Left sided weakness   Psych/mental status: Disheveled     LABS AND IMAGING:     Recent Labs   Lab 02/10/25  2153   WBC 16.10*   RBC 4.53*   HGB 14.9   HCT 41.5*   MCV 91.6   MCH 32.9*   MCHC 35.9   RDW 12.9      MPV 9.6       Recent Labs   Lab 02/10/25  2153      K 3.3*      CO2 29   BUN 9.4   CREATININE 0.87   CALCIUM 9.6   ALBUMIN 4.0   ALKPHOS 78   *   *   BILITOT 1.0       Microbiology Results (last 7 days)       Procedure Component Value Units Date/Time    Urine culture [[id number]] Collected: 02/11/25 0257    Order Status: Sent Specimen: Urine Updated: 02/11/25 0335    Blood culture #2 **CANNOT BE ORDERED STAT** [[id number]] Collected: 02/10/25 2213    Order Status: Resulted Specimen: Blood from Forearm, Right Updated: 02/10/25 2216    Blood culture #1 **CANNOT BE ORDERED STAT** [[id number]] Collected: 02/10/25 2213    Order Status: Resulted Specimen: Blood from Antecubital, Right Updated: 02/10/25 2216             CTA Chest Non-Coronary (PE Studies)  Narrative: EXAMINATION:  CTA CHEST NON CORONARY (PE STUDIES)    CLINICAL HISTORY:  Pulmonary embolism (PE) suspected, high prob;    TECHNIQUE:  Axial CT images were obtained through the chest after IV administration of 80 mL Omnipaque 350 contrast.  MIP, coronal and sagittal reconstructions submitted and interpreted. Dose reduction techniques including automatic exposure control (AEC) were utilized.    Total  mGycm    COMPARISON:  Chest radiograph 02/10/2025.  CT chest  09/05/2019    FINDINGS:  No filling defects are in the main pulmonary artery or segmental branches.    No focal consolidation, pneumothorax or pleural effusion.  Mild bibasilar subsegmental atelectasis versus scarring.    Central airways are clear.    Heart is normal size.  Thoracic aorta is normal in caliber.    1.4 cm lymph node which is mildly enlarged located in the mediastinum anterior to the distal trachea, favored to be reactive in etiology.    No acute osseous findings.    No acute abnormality in the visualized upper abdomen.    Bilateral gynecomastia.    There is a generator implanted in the left lateral chest wall with leads coursing anteriorly and terminating in the subcutaneous fat overlying the sternum.  Impression: No CT evidence of acute pulmonary embolism or other acute intrathoracic pathology.    Mild bibasilar subsegmental atelectasis versus scarring.    No significant discrepancy between preliminary and final reports.    Electronically signed by: Matt Benavidez  Date:    02/11/2025  Time:    07:43  CTA Head and Neck (xpd)  Narrative: EXAMINATION:  CTA HEAD AND NECK (XPD)    CLINICAL HISTORY:  Stroke/TIA, determine embolic source;    TECHNIQUE:  Non contrast low dose axial images were obtained thought the head. CT angiogram was performed from the level of the sunil to the top of the head following the IV administration of 125mL of Omnipaque 350.   Sagittal and coronal, 3D and maximum intensity projection reconstructions were performed. Two additional phases of immediate post-contrast CT images were performed through the head alone. Arterial stenosis percentages are based on NASCET measurement criteria.  Dose reduction techniques including automatic exposure control (AEC) were utilized.    Dose (DLP): 2857 mGycm    COMPARISON:  CT head without contrast 02/10/2025. CT head without and with IV contrast 09/03/2021.    FINDINGS:  CTA HEAD:    INTRACRANIAL: Right posterior parietal encephalomalacia with  coarse calcification.  Right insular ribbon encephalomalacia.  Small patchy foci of low density in the right frontoparietal subcortical white matter likely representing sequelae of chronic ischemia as well.  No acute intracranial hemorrhage.  No hydrocephalus.  No intracranial mass effect.  No abnormal intracranial enhancement.    SINUSES: Visualized paranasal sinuses and mastoids are clear.    SKULL/SCALP: Visualized osseous structures are normal.    ORBITS: Visualized orbits are normal.    VASCULATURE: No evidence of acute large vessel occlusion.  There is asymmetric smaller caliber and decreased branches associated with the right MCA relative to the left MCA which is stable dating back to 09/30/2021.  No intracranial aneurysm.  No vascular malformation.  Deep cerebral veins and dural venous sinuses enhance normally.    CTA NECK:    AORTIC ARCH: Conventional aortic arch anatomy.    LEFT CAROTID    COMMON CAROTID: No occlusion, hemodynamically significant stenosis, dissection or aneurysm.    INTERNAL CAROTID: No occlusion, hemodynamically significant stenosis, dissection or aneurysm.    RIGHT CAROTID    COMMON CAROTID: No occlusion, hemodynamically significant stenosis, dissection or aneurysm.    INTERNAL CAROTID: No occlusion, hemodynamically significant stenosis, dissection or aneurysm.    VERTEBRAL ARTERIES    LEFT VERTEBRAL: No occlusion, hemodynamically significant stenosis, dissection or aneurysm.    RIGHT VERTEBRAL: No occlusion, hemodynamically significant stenosis, dissection or aneurysm.    OTHER: Visualized upper lungs are clear.  Mildly enlarged lymph node visualized anterior to the trachea within the mediastinum measuring 1.3 cm in long axis.  No acute fracture or aggressive osseous lesion.  No significant pathology in the visualized cervical soft tissues.  Mucosal thickening and frothy secretions are noted within the right maxillary sinus, suggestive of acute sinusitis.  Mild mucosal thickening of the  right sphenoid sinus and right anterior ethmoid sinus.  Middle ear cavities and mastoid air cells are clear bilaterally.  Impression: No acute vascular abnormality identified.  There is, however, decreased caliber and number of branches associated with the right middle cerebral artery relative to the left middle cerebral artery which is stable dating back to 09/30/2021 possibly secondary to sequelae of vasculitis or thromboembolic event.  There also multiple areas of encephalomalacia noted within the right MCA vascular territory.    Frothy secretions in the right maxillary sinus, suggestive of acute sinusitis.    Mildly enlarged mediastinal lymph node measuring 1.3 cm in long axis, nonspecific but favored to be reactive in etiology.    I discussed the findings with Irma Dent MD at 07:19 on 02/11/2025    Electronically signed by: Matt Benavidez  Date:    02/11/2025  Time:    07:19  CT Head Without Contrast  Narrative: EXAMINATION:  CT HEAD WITHOUT CONTRAST    CLINICAL HISTORY:  Neuro deficit, acute, stroke suspected;    TECHNIQUE:  Axial scans were obtained from skull base to the vertex.    Coronal and sagittal reconstructions obtained from the axial data.    Automatic exposure control was utilized to limit radiation dose.    Contrast: None    Radiation Dose:    Total DLP: 2857 mGy*cm    COMPARISON:  CT head dated 04/24/2021    FINDINGS:  There is no acute intracranial hemorrhage or edema.  There is encephalomalacia in the right frontal and parietal lobes with dystrophic calcifications.    There is no mass effect or midline shift.  There is parenchymal volume loss with ex vacuo dilatation of the right occipital.  The basal cisterns are patent. There is no abnormal extra-axial fluid collection.    The calvarium and skull base are intact.  There is partial opacification of the right maxillary sinus.  Impression: 1. No acute intracranial abnormality.  2. Chronic ischemic changes.  No significant change from the  [facility] interpretation.    Electronically signed by: Sofy Castellon  Date:    02/11/2025  Time:    06:34    Stroke workup:  CTh:No acute intracranial abnormality.   CTA h/n: No acute vascular abnormality identified.  There is, however, decreased caliber and number of branches associated with the right middle cerebral artery relative to the left middle cerebral artery which is stable dating back to 09/30/2021 possibly secondary to sequelae of vasculitis or thromboembolic event.  There also multiple areas of encephalomalacia noted within the right MCA vascular territory. Frothy secretions in the right maxillary sinus, suggestive of acute sinusitis. Mildly enlarged mediastinal lymph node measuring 1.3 cm in long axis, nonspecific but favored to be reactive in etiology  MRI Brain:  Echo:   CUS:   -LDL: 65  -A1c: 4.8  -TSH: normal    CTA chest - No CT evidence of acute pulmonary embolism or other acute intrathoracic pathology. Mild bibasilar subsegmental atelectasis versus scarring.  Ethanol 21, UDS amp, benzo, cannabinoids, fent +ve  RUQ us 2/11 Mild hepatomegaly. 2. No gallstones or biliary ductal dilatation.    ASSESSMENT & PLAN:     Rhabdomyolysis - likely 2/2 drug use  Generalized weakness/ Drowsiness - likely 2/2 drug use  HIV - unclear adherence to ART  Hepatitis-C s/p treatment in 2019  Hx of Cardiac arrest/ICD  Hx of endocarditis involving AICD  - s/p AICD extraction and placement of SICD in 9/2021  IVDU  Hx of mycotic cerebral aneurysm with associated ICH  Hx of CVA requiring tracheostomy placement s/p removed with residual left side weakness     Essential hypertension  Seizure disorder  PTSD  Depression   Leukocytosis  Elevated LFTs  Mild hepatomegaly    Plan  ECHO 2/11 - normal systolic function with a visually estimated ejection fraction of 50 - 55%. Grade II diastolic dysfunction.   CK in 2000s, starting LR @75 cc hr given CHFpEF on ECHO  Monitor CK, I&O, cr, K  S/p 1 x nacran ordered    S/p cefepime in  ER. Blood cx ordered. Monitor fever curve  On aspirin 8 1 and statin  MRI brain, B Carotid US pending    HIV (CD4 absolute 154 on 8/2024, undetectable viral load)   - on biktarvy- unclear adherence to ART; check cd4, VL   - may need ppx if CD4<200    Hepatitis-C (RNA neg 8/2024, s/p treatment in 2019)  Hepatitis panel ordered, HCV RNA ordered    Elevated LFTs - monitor  RUQ us 2/11 Mild hepatomegaly. 2. No gallstones or biliary ductal dilatation.  Leukocytosis    bilateral lower extremity edema - l>R  F/u US DVT lower extremities    NPO until passes Son or cleared by SLP  PT/OT/SLP to evaluate and treat when appropriate    Neuro checks every 4 hours  Continuous telemetry monitoring  Fall precautions    Resume home medications as deemed appropriate once medication reconciliation is updated    Labs am    Critical care note:  Critical care diagnosis: Rhabdomyolysis  Critical care interventions: Hands-on evaluation, review of labs/radiographs/records and discussion with patient and family if present  Critical care time spent: 35 minutes      VTE Prophylaxis: lovenox    Patient condition:  Guarded    __________________________________________________________________________  INPATIENT LIST OF MEDICATIONS     Scheduled Meds:   aspirin  81 mg Oral Daily    atorvastatin  40 mg Oral Daily    haloperidoL  2 mg Oral Once    [START ON 2/12/2025] mupirocin   Nasal BID    potassium chloride  40 mEq Oral BID     Continuous Infusions:  PRN Meds:.  Current Facility-Administered Medications:     bisacodyL, 10 mg, Rectal, Daily PRN    calcium carbonate, 500 mg, Oral, TID PRN    hydrALAZINE, 10 mg, Intravenous, Q6H PRN    labetalol, 10 mg, Intravenous, Q4H PRN    sodium chloride 0.9%, 10 mL, Intravenous, PRN      Discharge Planning and Disposition:   If patient was admitted under observational status it is with my approval/permission.        All diagnosis and differential diagnosis have been reviewed; assessment and plan has been  documented; I have personally reviewed the labs and test results that are presently available; I have reviewed the patients medication list; I have reviewed the consulting providers response and recommendations. I have reviewed or attempted to review medical records based upon their availability.    All of the patient and family questions have been addressed and answered. Patient's is agreeable to the above stated plan. I will continue to monitor closely and make adjustments to medical management as needed.    If patient was admitted under observational status it is with my approval/permission.      Faisal Means MD   02/11/2025

## 2025-02-11 NOTE — NURSING
Admission documentation completed by the admit nurse. Home med rec not complete. Pt did not elect for meds to bed with [facility]. 4 Eyes and standing weight to be completed by the admitting floor nurse.

## 2025-02-12 VITALS
OXYGEN SATURATION: 96 % | HEIGHT: 74 IN | WEIGHT: 153.44 LBS | DIASTOLIC BLOOD PRESSURE: 78 MMHG | RESPIRATION RATE: 19 BRPM | BODY MASS INDEX: 19.69 KG/M2 | HEART RATE: 102 BPM | SYSTOLIC BLOOD PRESSURE: 113 MMHG | TEMPERATURE: 98 F

## 2025-02-12 LAB
ALBUMIN SERPL-MCNC: 3.2 G/DL (ref 3.5–5)
ALBUMIN/GLOB SERPL: 1 RATIO (ref 1.1–2)
ALP SERPL-CCNC: 64 UNIT/L (ref 40–150)
ALT SERPL-CCNC: 68 UNIT/L (ref 0–55)
ANION GAP SERPL CALC-SCNC: 5 MEQ/L
AST SERPL-CCNC: 70 UNIT/L (ref 5–34)
BASOPHILS # BLD AUTO: 0.02 X10(3)/MCL
BASOPHILS NFR BLD AUTO: 0.2 %
BILIRUB SERPL-MCNC: 0.8 MG/DL
BUN SERPL-MCNC: 8.1 MG/DL (ref 8.9–20.6)
CALCIUM SERPL-MCNC: 9 MG/DL (ref 8.4–10.2)
CHLORIDE SERPL-SCNC: 107 MMOL/L (ref 98–107)
CK SERPL-CCNC: 1116 U/L (ref 30–200)
CO2 SERPL-SCNC: 26 MMOL/L (ref 22–29)
CREAT SERPL-MCNC: 0.71 MG/DL (ref 0.72–1.25)
CREAT/UREA NIT SERPL: 11
EOSINOPHIL # BLD AUTO: 0.06 X10(3)/MCL (ref 0–0.9)
EOSINOPHIL NFR BLD AUTO: 0.6 %
ERYTHROCYTE [DISTWIDTH] IN BLOOD BY AUTOMATED COUNT: 12.8 % (ref 11.5–17)
GFR SERPLBLD CREATININE-BSD FMLA CKD-EPI: >60 ML/MIN/1.73/M2
GLOBULIN SER-MCNC: 3.2 GM/DL (ref 2.4–3.5)
GLUCOSE SERPL-MCNC: 89 MG/DL (ref 74–100)
HAV AB SER QL IA: NONREACTIVE
HAV IGM SERPL QL IA: NONREACTIVE
HBV CORE AB SERPL QL IA: NONREACTIVE
HBV CORE IGM SERPL QL IA: NONREACTIVE
HBV SURFACE AB SER-ACNC: 352.61 MIU/ML
HBV SURFACE AB SERPL IA-ACNC: REACTIVE M[IU]/ML
HBV SURFACE AG SERPL QL IA: NONREACTIVE
HCT VFR BLD AUTO: 39.5 % (ref 42–52)
HCV AB SERPL QL IA: REACTIVE
HCV RNA SERPL NAA+PROBE-ACNC: NORMAL IU/ML
HGB BLD-MCNC: 14.2 G/DL (ref 14–18)
HIV1 RNA # SERPL NAA+PROBE: 113 COPIES/ML
HIV1 RNA SERPL NAA+PROBE-LOG#: DETECTED {LOG_COPIES}/ML
IMM GRANULOCYTES # BLD AUTO: 0.04 X10(3)/MCL (ref 0–0.04)
IMM GRANULOCYTES NFR BLD AUTO: 0.4 %
LYMPHOCYTES # BLD AUTO: 1.98 X10(3)/MCL (ref 0.6–4.6)
LYMPHOCYTES NFR BLD AUTO: 19.7 %
MAGNESIUM SERPL-MCNC: 1.9 MG/DL (ref 1.6–2.6)
MCH RBC QN AUTO: 32.6 PG (ref 27–31)
MCHC RBC AUTO-ENTMCNC: 35.9 G/DL (ref 33–36)
MCV RBC AUTO: 90.8 FL (ref 80–94)
MONOCYTES # BLD AUTO: 0.93 X10(3)/MCL (ref 0.1–1.3)
MONOCYTES NFR BLD AUTO: 9.2 %
NEUTROPHILS # BLD AUTO: 7.03 X10(3)/MCL (ref 2.1–9.2)
NEUTROPHILS NFR BLD AUTO: 69.9 %
NRBC BLD AUTO-RTO: 0 %
PLATELET # BLD AUTO: 209 X10(3)/MCL (ref 130–400)
PMV BLD AUTO: 9.5 FL (ref 7.4–10.4)
POTASSIUM SERPL-SCNC: 3.7 MMOL/L (ref 3.5–5.1)
PROT SERPL-MCNC: 6.4 GM/DL (ref 6.4–8.3)
RBC # BLD AUTO: 4.35 X10(6)/MCL (ref 4.7–6.1)
SODIUM SERPL-SCNC: 138 MMOL/L (ref 136–145)
WBC # BLD AUTO: 10.06 X10(3)/MCL (ref 4.5–11.5)

## 2025-02-12 PROCEDURE — 36410 VNPNXR 3YR/> PHY/QHP DX/THER: CPT

## 2025-02-12 PROCEDURE — 36415 COLL VENOUS BLD VENIPUNCTURE: CPT | Performed by: INTERNAL MEDICINE

## 2025-02-12 PROCEDURE — 02HV33Z INSERTION OF INFUSION DEVICE INTO SUPERIOR VENA CAVA, PERCUTANEOUS APPROACH: ICD-10-PCS | Performed by: INTERNAL MEDICINE

## 2025-02-12 PROCEDURE — 36415 COLL VENOUS BLD VENIPUNCTURE: CPT

## 2025-02-12 PROCEDURE — C1751 CATH, INF, PER/CENT/MIDLINE: HCPCS

## 2025-02-12 PROCEDURE — 83735 ASSAY OF MAGNESIUM: CPT

## 2025-02-12 PROCEDURE — 87040 BLOOD CULTURE FOR BACTERIA: CPT | Performed by: INTERNAL MEDICINE

## 2025-02-12 PROCEDURE — 25000003 PHARM REV CODE 250: Performed by: INTERNAL MEDICINE

## 2025-02-12 PROCEDURE — 82550 ASSAY OF CK (CPK): CPT | Performed by: INTERNAL MEDICINE

## 2025-02-12 PROCEDURE — 25000003 PHARM REV CODE 250

## 2025-02-12 PROCEDURE — 85025 COMPLETE CBC W/AUTO DIFF WBC: CPT

## 2025-02-12 PROCEDURE — 25000003 PHARM REV CODE 250: Performed by: STUDENT IN AN ORGANIZED HEALTH CARE EDUCATION/TRAINING PROGRAM

## 2025-02-12 PROCEDURE — 80053 COMPREHEN METABOLIC PANEL: CPT

## 2025-02-12 PROCEDURE — 63600175 PHARM REV CODE 636 W HCPCS: Performed by: INTERNAL MEDICINE

## 2025-02-12 RX ORDER — SODIUM CHLORIDE 0.9 % (FLUSH) 0.9 %
10 SYRINGE (ML) INJECTION EVERY 12 HOURS PRN
Status: DISCONTINUED | OUTPATIENT
Start: 2025-02-12 | End: 2025-02-12 | Stop reason: HOSPADM

## 2025-02-12 RX ORDER — ELECTROLYTES/DEXTROSE
200 SOLUTION, ORAL ORAL
Status: DISCONTINUED | OUTPATIENT
Start: 2025-02-12 | End: 2025-02-12 | Stop reason: HOSPADM

## 2025-02-12 RX ADMIN — THIAMINE HCL TAB 100 MG 100 MG: 100 TAB at 08:02

## 2025-02-12 RX ADMIN — MUPIROCIN: 20 OINTMENT TOPICAL at 08:02

## 2025-02-12 RX ADMIN — SODIUM CHLORIDE, POTASSIUM CHLORIDE, SODIUM LACTATE AND CALCIUM CHLORIDE: 600; 310; 30; 20 INJECTION, SOLUTION INTRAVENOUS at 04:02

## 2025-02-12 RX ADMIN — Medication 200 ML: at 05:02

## 2025-02-12 RX ADMIN — BUSPIRONE HYDROCHLORIDE 30 MG: 15 TABLET ORAL at 08:02

## 2025-02-12 RX ADMIN — ASPIRIN 81 MG: 81 TABLET, COATED ORAL at 08:02

## 2025-02-12 RX ADMIN — ATORVASTATIN CALCIUM 40 MG: 40 TABLET, FILM COATED ORAL at 08:02

## 2025-02-12 NOTE — PLAN OF CARE
Initial DC Assessment done with pt and Juan MELLO) at bedside. Pt states he lives with tucker [place]; No HH/DME.  PCP: Aston Montelongo MD  [facility] [address] [facility] [address] [facility]  Pt denies any dc needs at thist  time.     02/12/25 1214   Discharge Assessment   Assessment Type Discharge Planning Assessment   Confirmed/corrected address, phone number and insurance Yes   Confirmed Demographics Correct on Facesheet   Source of Information patient   When was your last doctors appointment? 01/15/25   Communicated TOMMIE with patient/caregiver Date not available/Unable to determine   Reason For Admission lt sided weakness, lt side facial droop; slurred speech   People in Home significant other   Do you expect to return to your current living situation? Yes   Do you have help at home or someone to help you manage your care at home? Yes   Who are your caregiver(s) and their phone number(s)? Juanphuong Luis ANGELICA) [phone]   Current cognitive status: Alert/Oriented   Walking or Climbing Stairs Difficulty no   Dressing/Bathing Difficulty no   Home Layout Able to live on 1st floor   Equipment Currently Used at Home none   Readmission within 30 days? No   Patient currently being followed by outpatient case management? No   Do you currently have service(s) that help you manage your care at home? No   Do you take prescription medications? Yes   Do you have any problems affording any of your prescribed medications? No   Who is going to help you get home at discharge? Juan (ANGELICA)   How do you get to doctors appointments? car, drives self   Are you on dialysis? No   Do you take coumadin? No   Discharge Plan A Home   Discharge Plan B Home Health   DME Needed Upon Discharge  none   Discharge Plan discussed with: Patient;Spouse/sig other   Physical Activity   On average, how many days per week do you engage in moderate to strenuous exercise (like a brisk walk)? Pt Declined   On average, how many minutes do you engage in exercise at this  level? Pt Declined   Financial Resource Strain   How hard is it for you to pay for the very basics like food, housing, medical care, and heating? Pt Declined   Housing Stability   In the last 12 months, was there a time when you were not able to pay the mortgage or rent on time? Pt Declined   Transportation Needs   Has the lack of transportation kept you from medical appointments, meetings, work or from getting things needed for daily living? Patient declined   Food Insecurity   Within the past 12 months, you worried that your food would run out before you got the money to buy more. Pt Declined   Within the past 12 months, the food you bought just didn't last and you didn't have money to get more. Pt Declined   Stress   Do you feel stress - tense, restless, nervous, or anxious, or unable to sleep at night because your mind is troubled all the time - these days? Pt Declined   Social Isolation   How often do you feel lonely or isolated from those around you?  Patient declined   Alcohol Use   Q1: How often do you have a drink containing alcohol? Pt Declined   Q2: How many drinks containing alcohol do you have on a typical day when you are drinking? Pt Declined   Q3: How often do you have six or more drinks on one occasion? Pt Declined   Utilities   In the past 12 months has the electric, gas, oil, or water company threatened to shut off services in your home? No   Health Literacy   How often do you need to have someone help you when you read instructions, pamphlets, or other written material from your doctor or pharmacy? Patient declines to respond   OTHER   Name(s) of People in Home Juan Luis

## 2025-02-12 NOTE — PROCEDURES
"Britton Perez is a 34 y.o. male patient.    Temp: 97.5 °F (36.4 °C) (02/12/25 0800)  Pulse: 100 (02/12/25 0800)  Resp: 19 (02/11/25 1609)  BP: 106/64 (02/12/25 0800)  SpO2: 96 % (02/12/25 0800)  Weight: 69.6 kg (153 lb 7 oz) (02/11/25 1609)  Height: 6' 2" (188 cm) (02/11/25 1609)    PICC  Date/Time: 2/12/2025 10:22 AM  Performed by: Alison Lovelace RN  Consent Done: Yes  Time out: Immediately prior to procedure a time out was called to verify the correct patient, procedure, equipment, support staff and site/side marked as required  Indications: med administration and vascular access  Anesthesia: local infiltration  Local anesthetic: lidocaine 1% without epinephrine  Anesthetic Total (mL): 4  Preparation: skin prepped with ChloraPrep  Skin prep agent dried: skin prep agent completely dried prior to procedure  Sterile barriers: all five maximum sterile barriers used - cap, mask, sterile gown, sterile gloves, and large sterile sheet  Hand hygiene: hand hygiene performed prior to central venous catheter insertion  Location details: right basilic  Catheter type: single lumen  Catheter size: 4 Fr  Catheter Length: 15cm    Ultrasound guidance: yes  Vessel Caliber: medium and patent, compressibility normal  Needle advanced into vessel with real time Ultrasound guidance.  Guidewire confirmed in vessel.  Sterile sheath used.  Number of attempts: 1  Post-procedure: blood return through all ports, sterile dressing applied and chlorhexidine patch    Complications: none  Comments: Arm circ- 29cm          Alison Lovelace RN  2/12/2025    "

## 2025-02-12 NOTE — PLAN OF CARE
Problem: Adult Inpatient Plan of Care  Goal: Absence of Hospital-Acquired Illness or Injury  2/11/2025 1947 by Suzy Lim LPN  Outcome: Progressing  2/11/2025 1947 by Suzy Lim LPN  Outcome: Progressing     Problem: Adult Inpatient Plan of Care  Goal: Optimal Comfort and Wellbeing  2/11/2025 1947 by Suzy Lim LPN  Outcome: Progressing  2/11/2025 1947 by Suzy Lim LPN  Outcome: Progressing     Problem: Stroke, Ischemic (Includes Transient Ischemic Attack)  Goal: Optimal Coping  2/11/2025 1947 by Suzy Lim LPN  Outcome: Progressing  2/11/2025 1947 by Suzy Lim LPN  Outcome: Progressing     Problem: Stroke, Ischemic (Includes Transient Ischemic Attack)  Goal: Optimal Cerebral Tissue Perfusion  2/11/2025 1947 by Suzy Lim LPN  Outcome: Progressing  2/11/2025 1947 by Suzy Lim LPN  Outcome: Progressing     Problem: Stroke, Ischemic (Includes Transient Ischemic Attack)  Goal: Improved Communication Skills  2/11/2025 1947 by Suzy Lim LPN  Outcome: Progressing  2/11/2025 1947 by Suzy Lim LPN  Outcome: Progressing     Problem: Stroke, Ischemic (Includes Transient Ischemic Attack)  Goal: Optimal Functional Ability  2/11/2025 1947 by Suzy Lim LPN  Outcome: Progressing  2/11/2025 1947 by Suzy Lim LPN  Outcome: Progressing     Problem: Stroke, Ischemic (Includes Transient Ischemic Attack)  Goal: Safe and Effective Swallow  2/11/2025 1947 by Suzy Lim LPN  Outcome: Progressing  2/11/2025 1947 by Suzy Lim LPN  Outcome: Progressing     Problem: Fall Injury Risk  Goal: Absence of Fall and Fall-Related Injury  2/11/2025 1947 by Suzy Lim LPN  Outcome: Progressing  2/11/2025 1947 by Suzy Lim LPN  Outcome: Progressing

## 2025-02-12 NOTE — PROGRESS NOTES
[facility] Hospital Medicine Progress Note        Chief Complaint: Inpatient Follow-up     HPI:   Britton Perez is a 34 y.o. male w/ past medical history of essential hypertension, hx of NICMO, cardiac arrest/ICD, hx of prolonged QT syndrome, hx of endocarditis involving AICD, s/p AICD extraction and placement of SICD in 9/2021, IVDU, mycotic cerebral aneurysm with associated ICH, hepatitis-C s/p treatment in 2019, HIV, seizure disorder, PTSD, and depression, hx of CVA requiring tracheostomy placement which has since been removed with residual left side weakness        The patient presented to [facility] on 2/10/2025 with a primary complaint of weakness onset 2 days ago. States his girlfriend made him come to the hospital. Poor historian with current mentation only answering questions with left words with difficulty to arouse. Mentions has chronic left sided weakness with worsening and generalized weakness. He also complains of bilateral lower extremity edema and speech difficulty. He reports chest pain and shortness of breath yesterday. Notes he used meth 2/9. He admits to marijuana and alcohol use 2/10. He denies visual disturbances.      CTh:No acute intracranial abnormality.   CTA h/n: No acute vascular abnormality identified.  There is, however, decreased caliber and number of branches associated with the right middle cerebral artery relative to the left middle cerebral artery which is stable dating back to 09/30/2021 possibly secondary to sequelae of vasculitis or thromboembolic event.  There also multiple areas of encephalomalacia noted within the right MCA vascular territory. Frothy secretions in the right maxillary sinus, suggestive of acute sinusitis. Mildly enlarged mediastinal lymph node measuring 1.3 cm in long axis, nonspecific but favored to be reactive in etiology  CTA chest - No CT evidence of acute pulmonary embolism or other acute intrathoracic pathology. Mild bibasilar  subsegmental atelectasis versus scarring.  Ethanol 21, UDS amp, benzo, cannabinoids, fent +ve  RUQ us 2/11 Mild hepatomegaly. 2. No gallstones or biliary ductal dilatation.  ECHO 2/11 - normal systolic function with a visually estimated ejection fraction of 50 - 55%. Grade II diastolic dysfunction.   CK in 2000s, starting LR @75 cc hr given CHFpEF on ECHO    Interval Hx:     AF. More aware today, sitting up in chair. Complains of some left sided chest pain with deep breaths.    Case was discussed with patient's nurse and  on the floor.    Objective/physical exam:  General: In no acute distress, afebrile  Chest: Clear to auscultation bilaterally  Heart: RRR, +S1, S2, no appreciable murmur  Abdomen: Soft, nontender, BS +  MSK: Warm, no lower extremity edema, no clubbing or cyanosis  Neurologic: Alert and oriented x4, Cranial nerve II-XII intact, some weakness on left side.    VITAL SIGNS: 24 HRS MIN & MAX LAST   Temp  Min: 97.5 °F (36.4 °C)  Max: 98.9 °F (37.2 °C) 97.5 °F (36.4 °C)   BP  Min: 105/64  Max: 131/82 106/64   Pulse  Min: 98  Max: 119  100   Resp  Min: 19  Max: 23 19   SpO2  Min: 95 %  Max: 99 % 96 %     I have reviewed the following labs:  Recent Labs   Lab 02/10/25  2153 02/11/25  1117 02/12/25  0336   WBC 16.10* 12.72* 10.06   RBC 4.53* 3.98* 4.35*   HGB 14.9 13.0* 14.2   HCT 41.5* 36.7* 39.5*   MCV 91.6 92.2 90.8   MCH 32.9* 32.7* 32.6*   MCHC 35.9 35.4 35.9   RDW 12.9 12.9 12.8    163 209   MPV 9.6 9.4 9.5     Recent Labs   Lab 02/10/25  2153 02/11/25  1117 02/12/25  0336    137 138   K 3.3* 3.6 3.7    103 107   CO2 29 27 26   BUN 9.4 8.9 8.1*   CREATININE 0.87 0.73 0.71*   CALCIUM 9.6 8.5 9.0   MG  --  1.80 1.90   ALBUMIN 4.0 3.1* 3.2*   ALKPHOS 78 65 64   * 81* 68*   * 119* 70*   BILITOT 1.0 1.2 0.8     Microbiology Results (last 7 days)       Procedure Component Value Units Date/Time    Blood Culture [[id number]] Collected: 02/12/25 0835    Order Status:  Resulted Specimen: Blood Updated: 02/12/25 0845    Blood Culture [[id number]]     Order Status: Sent Specimen: Blood     Urine culture [[id number]] Collected: 02/11/25 0257    Order Status: Completed Specimen: Urine Updated: 02/12/25 0626     Urine Culture No Growth At 24 Hours    Blood culture #1 **CANNOT BE ORDERED STAT** [[id number]]  (Normal) Collected: 02/10/25 2213    Order Status: Completed Specimen: Blood from Antecubital, Right Updated: 02/11/25 2300     Blood Culture No Growth At 24 Hours    Blood culture #2 **CANNOT BE ORDERED STAT** [[id number]]  (Normal) Collected: 02/10/25 2213    Order Status: Completed Specimen: Blood from Forearm, Right Updated: 02/11/25 2300     Blood Culture No Growth At 24 Hours             See below for Radiology    Stroke workup:  CTh:No acute intracranial abnormality.   CTA h/n: No acute vascular abnormality identified.  There is, however, decreased caliber and number of branches associated with the right middle cerebral artery relative to the left middle cerebral artery which is stable dating back to 09/30/2021 possibly secondary to sequelae of vasculitis or thromboembolic event.  There also multiple areas of encephalomalacia noted within the right MCA vascular territory. Frothy secretions in the right maxillary sinus, suggestive of acute sinusitis. Mildly enlarged mediastinal lymph node measuring 1.3 cm in long axis, nonspecific but favored to be reactive in etiology  MRI Brain:  Echo:   CUS: no significant stenosis  -LDL: 65  -A1c: 4.8  -TSH: normal     CTA chest - No CT evidence of acute pulmonary embolism or other acute intrathoracic pathology. Mild bibasilar subsegmental atelectasis versus scarring.  Ethanol 21, UDS amp, benzo, cannabinoids, fent +ve  RUQ us 2/11 Mild hepatomegaly. 2. No gallstones or biliary ductal dilatation.    Assessment/Plan:    Rhabdomyolysis - likely 2/2 drug use  Generalized weakness/ Drowsiness - likely 2/2 drug use  HIV - unclear adherence to  ART  Hepatitis-C s/p treatment in 2019  Hx of Cardiac arrest/ICD  Hx of endocarditis involving AICD  - s/p AICD extraction and placement of SICD in 9/2021  IVDU  Hx of mycotic cerebral aneurysm with associated ICH  Hx of CVA requiring tracheostomy placement s/p removed with residual left side weakness     Essential hypertension  Seizure disorder  PTSD  Depression   Leukocytosis  Elevated LFTs  Mild hepatomegaly     Plan  ECHO 2/11 - normal systolic function with a visually estimated ejection fraction of 50 - 55%. Grade II diastolic dysfunction.   CK 2000 >>1100, on LR @75 cc hr given CHFpEF on ECHO  Monitor CK, I&O, cr, K  S/p 1 x nacran 2/11     S/p cefepime in ER. Blood cx ordered. Monitor fever curve  On aspirin 81 and statin  Pending MRI brain; other workup listed above     HIV (CD4 absolute 154 on 8/2024, undetectable viral load)   - on biktarvy- unclear adherence to ART; F/u cd4  - 2/11 HIV VL - 113, likely was taking ART however with occasional missed doses based on VL (was Undetectable 5 months ago), counseled extensively on compliance and states he was taking biktarvy most days  - he follows up with his PCP for biktarvy refills, will restart Biktarvy at this time  - may need bactrim ppx if CD4<200     Hepatitis-C (RNA neg 8/2024, s/p treatment in 2019)  Hepatitis panel ordered, HCV RNA ordered as high risk of re-infection with HCV with drug use hx     Elevated LFTs - monitor  RUQ us 2/11 Mild hepatomegaly. 2. No gallstones or biliary ductal dilatation.  Leukocytosis     bilateral lower extremity edema prior to presentation  US - Negative for deep and superficial vein thrombosis in bilateral lower extremities.      PT/OT/SLP to evaluate and treat when appropriate  Neuro checks every 4 hours  Continuous telemetry monitoring  Fall precautions     Resume home medications as deemed appropriate once medication reconciliation is updated     Labs am     Critical care note:  Critical care diagnosis:  Rhabdomyolysis  Critical care interventions: Hands-on evaluation, review of labs/radiographs/records and discussion with patient and family if present  Critical care time spent: 35 minutes        VTE Prophylaxis: lovenox     Patient condition:  Guarded    Anticipated discharge and Disposition:         All diagnosis and differential diagnosis have been reviewed; assessment and plan has been documented; I have personally reviewed the labs and test results that are presently available; I have reviewed the patients medication list; I have reviewed the consulting providers response and recommendations. I have reviewed or attempted to review medical records based upon their availability    All of the patient's questions have been  addressed and answered. Patient's is agreeable to the above stated plan. I will continue to monitor closely and make adjustments to medical management as needed.    Portions of this note dictated using EMR integrated voice recognition software, and may be subject to voice recognition errors not corrected at proofreading. Please contact writer for clarification if needed.   _____________________________________________________________________    Malnutrition Status:  Nutrition consulted. Most recent weight and BMI monitored-     Measurements:  Wt Readings from Last 1 Encounters:   02/11/25 69.6 kg (153 lb 7 oz)   Body mass index is 19.7 kg/m².    Patient has been screened and assessed by RD.    Malnutrition Type:  Context:    Level:      Malnutrition Characteristic Summary:       Interventions/Recommendations (treatment strategy):        Scheduled Med:   aspirin  81 mg Oral Daily    atorvastatin  40 mg Oral Daily    busPIRone  30 mg Oral BID    mupirocin   Nasal BID    thiamine  100 mg Oral Daily      Continuous Infusions:   lactated ringers   Intravenous Continuous 75 mL/hr at 02/11/25 1229 New Bag at 02/11/25 1229      PRN Meds:    Current Facility-Administered Medications:     acetaminophen,  1,000 mg, Oral, Q6H PRN    albuterol, 1 puff, Inhalation, PRN    bisacodyL, 10 mg, Rectal, Daily PRN    calcium carbonate, 500 mg, Oral, TID PRN    hydrALAZINE, 10 mg, Intravenous, Q6H PRN    labetalol, 10 mg, Intravenous, Q4H PRN    sodium chloride 0.9%, 10 mL, Intravenous, PRN    Flushing PICC/Midline Protocol, , , Until Discontinued **AND** sodium chloride 0.9%, 10 mL, Intravenous, Q12H PRN     Radiology:  I have personally reviewed the following imaging and agree with the radiologist.     CV Ultrasound Bilateral Doppler Carotid  The right internal carotid artery is patent with no evidence of stenosis.  The left internal carotid artery is patent with less than 50% stenosis.  Bilateral vertebral arteries are patent with antegrade flow.  Echo    Left Ventricle: The left ventricle is mildly dilated. Normal wall   thickness. There is low normal systolic function with a visually estimated   ejection fraction of 50 - 55%. Grade II diastolic dysfunction.    Right Ventricle: Normal right ventricular cavity size. Systolic   function is borderline low. TAPSE is 1.60 cm.    Mitral Valve: There is mild regurgitation.    Tricuspid Valve: There is mild regurgitation.    IVC/SVC: Intermediate venous pressure at 8 mmHg.  US Abdomen Limited  Narrative: EXAMINATION:  US ABDOMEN LIMITED    CLINICAL HISTORY:  RUQ, elevated LFTs;    COMPARISON:  5 September 2019.    FINDINGS:  Grayscale, color and spectral doppler evaluation of the right upper quadrant.    No focal abnormality of limited visualized pancreas. Imaged portion of IVC normal in caliber.    Liver is mildly enlarged.  No focal suspicious liver lesion.  Normal hepatopetal flow is noted in the portal vein.    No gallstones. No significant gallbladder wall thickening or pericholecystic fluid.  The common bile duct is normal in caliber  and measures 5 mm.    Right kidney measures 12-13 cm in length. No hydronephrosis.  Impression: 1. Mild hepatomegaly.  2. No gallstones or  biliary ductal dilatation.    Electronically signed by: Kwabena Chisholm  Date:    02/11/2025  Time:    09:52  CTA Chest Non-Coronary (PE Studies)  Narrative: EXAMINATION:  CTA CHEST NON CORONARY (PE STUDIES)    CLINICAL HISTORY:  Pulmonary embolism (PE) suspected, high prob;    TECHNIQUE:  Axial CT images were obtained through the chest after IV administration of 80 mL Omnipaque 350 contrast.  MIP, coronal and sagittal reconstructions submitted and interpreted. Dose reduction techniques including automatic exposure control (AEC) were utilized.    Total  mGycm    COMPARISON:  Chest radiograph 02/10/2025.  CT chest 09/05/2019    FINDINGS:  No filling defects are in the main pulmonary artery or segmental branches.    No focal consolidation, pneumothorax or pleural effusion.  Mild bibasilar subsegmental atelectasis versus scarring.    Central airways are clear.    Heart is normal size.  Thoracic aorta is normal in caliber.    1.4 cm lymph node which is mildly enlarged located in the mediastinum anterior to the distal trachea, favored to be reactive in etiology.    No acute osseous findings.    No acute abnormality in the visualized upper abdomen.    Bilateral gynecomastia.    There is a generator implanted in the left lateral chest wall with leads coursing anteriorly and terminating in the subcutaneous fat overlying the sternum.  Impression: No CT evidence of acute pulmonary embolism or other acute intrathoracic pathology.    Mild bibasilar subsegmental atelectasis versus scarring.    No significant discrepancy between preliminary and final reports.    Electronically signed by: Matt Benavidez  Date:    02/11/2025  Time:    07:43  CTA Head and Neck (xpd)  Narrative: EXAMINATION:  CTA HEAD AND NECK (XPD)    CLINICAL HISTORY:  Stroke/TIA, determine embolic source;    TECHNIQUE:  Non contrast low dose axial images were obtained thought the head. CT angiogram was performed from the level of the sunil to the top of the  head following the IV administration of 125mL of Omnipaque 350.   Sagittal and coronal, 3D and maximum intensity projection reconstructions were performed. Two additional phases of immediate post-contrast CT images were performed through the head alone. Arterial stenosis percentages are based on NASCET measurement criteria.  Dose reduction techniques including automatic exposure control (AEC) were utilized.    Dose (DLP): 2857 mGycm    COMPARISON:  CT head without contrast 02/10/2025. CT head without and with IV contrast 09/03/2021.    FINDINGS:  CTA HEAD:    INTRACRANIAL: Right posterior parietal encephalomalacia with coarse calcification.  Right insular ribbon encephalomalacia.  Small patchy foci of low density in the right frontoparietal subcortical white matter likely representing sequelae of chronic ischemia as well.  No acute intracranial hemorrhage.  No hydrocephalus.  No intracranial mass effect.  No abnormal intracranial enhancement.    SINUSES: Visualized paranasal sinuses and mastoids are clear.    SKULL/SCALP: Visualized osseous structures are normal.    ORBITS: Visualized orbits are normal.    VASCULATURE: No evidence of acute large vessel occlusion.  There is asymmetric smaller caliber and decreased branches associated with the right MCA relative to the left MCA which is stable dating back to 09/30/2021.  No intracranial aneurysm.  No vascular malformation.  Deep cerebral veins and dural venous sinuses enhance normally.    CTA NECK:    AORTIC ARCH: Conventional aortic arch anatomy.    LEFT CAROTID    COMMON CAROTID: No occlusion, hemodynamically significant stenosis, dissection or aneurysm.    INTERNAL CAROTID: No occlusion, hemodynamically significant stenosis, dissection or aneurysm.    RIGHT CAROTID    COMMON CAROTID: No occlusion, hemodynamically significant stenosis, dissection or aneurysm.    INTERNAL CAROTID: No occlusion, hemodynamically significant stenosis, dissection or  aneurysm.    VERTEBRAL ARTERIES    LEFT VERTEBRAL: No occlusion, hemodynamically significant stenosis, dissection or aneurysm.    RIGHT VERTEBRAL: No occlusion, hemodynamically significant stenosis, dissection or aneurysm.    OTHER: Visualized upper lungs are clear.  Mildly enlarged lymph node visualized anterior to the trachea within the mediastinum measuring 1.3 cm in long axis.  No acute fracture or aggressive osseous lesion.  No significant pathology in the visualized cervical soft tissues.  Mucosal thickening and frothy secretions are noted within the right maxillary sinus, suggestive of acute sinusitis.  Mild mucosal thickening of the right sphenoid sinus and right anterior ethmoid sinus.  Middle ear cavities and mastoid air cells are clear bilaterally.  Impression: No acute vascular abnormality identified.  There is, however, decreased caliber and number of branches associated with the right middle cerebral artery relative to the left middle cerebral artery which is stable dating back to 09/30/2021 possibly secondary to sequelae of vasculitis or thromboembolic event.  There also multiple areas of encephalomalacia noted within the right MCA vascular territory.    Frothy secretions in the right maxillary sinus, suggestive of acute sinusitis.    Mildly enlarged mediastinal lymph node measuring 1.3 cm in long axis, nonspecific but favored to be reactive in etiology.    I discussed the findings with Irma Dent MD at 07:19 on 02/11/2025    Electronically signed by: Matt Benavidez  Date:    02/11/2025  Time:    07:19  CT Head Without Contrast  Narrative: EXAMINATION:  CT HEAD WITHOUT CONTRAST    CLINICAL HISTORY:  Neuro deficit, acute, stroke suspected;    TECHNIQUE:  Axial scans were obtained from skull base to the vertex.    Coronal and sagittal reconstructions obtained from the axial data.    Automatic exposure control was utilized to limit radiation dose.    Contrast: None    Radiation Dose:    Total DLP: 2857  mGy*cm    COMPARISON:  CT head dated 04/24/2021    FINDINGS:  There is no acute intracranial hemorrhage or edema.  There is encephalomalacia in the right frontal and parietal lobes with dystrophic calcifications.    There is no mass effect or midline shift.  There is parenchymal volume loss with ex vacuo dilatation of the right occipital.  The basal cisterns are patent. There is no abnormal extra-axial fluid collection.    The calvarium and skull base are intact.  There is partial opacification of the right maxillary sinus.  Impression: 1. No acute intracranial abnormality.  2. Chronic ischemic changes.  No significant change from the [facility] interpretation.    Electronically signed by: Sofy Castellon  Date:    02/11/2025  Time:    06:34      Faisal Means MD  Department of Hospital Medicine   [facility]   02/12/2025

## 2025-02-12 NOTE — PLAN OF CARE
Problem: Stroke, Ischemic (Includes Transient Ischemic Attack)  Goal: Improved Communication Skills  Outcome: Progressing  Goal: Optimal Functional Ability  Outcome: Progressing  Goal: Optimal Nutrition Intake  Outcome: Progressing

## 2025-02-13 LAB
BACTERIA UR CULT: NO GROWTH
CD3 CELLS # BLD: 853 CELLS/MCL (ref 550–2202)
CD3 CELLS NFR BLD: 73 % (ref 58–86)
CD3+CD4+ CELLS # BLD: 189 CELLS/MCL (ref 365–1437)
CD3+CD4+ CELLS NFR BLD: 16 % (ref 32–64)
CD3+CD4+ CELLS/CD3+CD8+ CLL BLD: 0.3 %
CD3+CD8+ CELLS # BLD: 625 CELLS/MCL (ref 171–846)
CD3+CD8+ CELLS NFR BLD: 54 % (ref 15–40)
CD45 CELLS # BLD: 1.16 THOU/MCL (ref 0.82–2.84)
PATH REV: NORMAL

## 2025-02-13 NOTE — NURSING
Pt left AMA. Notified Lynda Burt NP and AMA paperwork signed. Pt stated that he feels fine and does not need to be here anymore.

## 2025-02-13 NOTE — PLAN OF CARE
Problem: Adult Inpatient Plan of Care  Goal: Plan of Care Review  2/12/2025 2133 by Suzy Lim LPN  Outcome: Met  2/12/2025 1940 by Suzy Lim LPN  Outcome: Progressing     Problem: Adult Inpatient Plan of Care  Goal: Patient-Specific Goal (Individualized)  2/12/2025 2133 by Suzy Lim LPN  Outcome: Met  2/12/2025 1940 by Suzy Lim LPN  Outcome: Progressing     Problem: Adult Inpatient Plan of Care  Goal: Absence of Hospital-Acquired Illness or Injury  2/12/2025 2133 by Suzy Lim LPN  Outcome: Met  2/12/2025 1940 by Suzy Lim LPN  Outcome: Progressing     Problem: Adult Inpatient Plan of Care  Goal: Plan of Care Review  2/12/2025 2133 by Suzy Lim LPN  Outcome: Met  2/12/2025 1940 by Suzy Lim LPN  Outcome: Progressing  Goal: Patient-Specific Goal (Individualized)  2/12/2025 2133 by Suzy Lim LPN  Outcome: Met  2/12/2025 1940 by Suzy Lim LPN  Outcome: Progressing  Goal: Absence of Hospital-Acquired Illness or Injury  2/12/2025 2133 by Suzy Lim LPN  Outcome: Met  2/12/2025 1940 by Suzy Lim LPN  Outcome: Progressing  Goal: Optimal Comfort and Wellbeing  2/12/2025 2133 by Suzy Lim LPN  Outcome: Met  2/12/2025 1940 by Suzy Lim LPN  Outcome: Progressing  Goal: Readiness for Transition of Care  2/12/2025 2133 by Suzy Lim LPN  Outcome: Met  2/12/2025 1940 by Suzy Lim LPN  Outcome: Progressing     Problem: Infection  Goal: Absence of Infection Signs and Symptoms  2/12/2025 2133 by Suzy Lim LPN  Outcome: Met  2/12/2025 1940 by Suzy Lim LPN  Outcome: Progressing     Problem: Stroke, Ischemic (Includes Transient Ischemic Attack)  Goal: Optimal Coping  2/12/2025 2133 by Suzy Lim LPN  Outcome: Met  2/12/2025 1940 by Suzy Lim LPN  Outcome: Progressing  Goal: Effective Bowel Elimination  2/12/2025 2133 by Suzy Lim LPN  Outcome:  Met  2/12/2025 1940 by Suzy Lim LPN  Outcome: Progressing  Goal: Optimal Cerebral Tissue Perfusion  2/12/2025 2133 by Suzy Lim LPN  Outcome: Met  2/12/2025 1940 by Suzy Lim LPN  Outcome: Progressing  Goal: Optimal Cognitive Function  2/12/2025 2133 by Suzy Lim LPN  Outcome: Met  2/12/2025 1940 by Suzy Lim LPN  Outcome: Progressing  Goal: Improved Communication Skills  2/12/2025 2133 by Suzy Lim LPN  Outcome: Met  2/12/2025 1940 by Suzy Lim LPN  Outcome: Progressing  Goal: Optimal Functional Ability  2/12/2025 2133 by Suzy Lim LPN  Outcome: Met  2/12/2025 1940 by Suzy Lim LPN  Outcome: Progressing  Goal: Optimal Nutrition Intake  2/12/2025 2133 by Suzy Lim LPN  Outcome: Met  2/12/2025 1940 by Suzy Lim LPN  Outcome: Progressing  Goal: Effective Oxygenation and Ventilation  2/12/2025 2133 by Suzy Lim LPN  Outcome: Met  2/12/2025 1940 by Suzy Lim LPN  Outcome: Progressing  Goal: Improved Sensorimotor Function  2/12/2025 2133 by Suzy Lim LPN  Outcome: Met  2/12/2025 1940 by Suzy Lim LPN  Outcome: Progressing  Goal: Safe and Effective Swallow  2/12/2025 2133 by Suzy Lim LPN  Outcome: Met  2/12/2025 1940 by Suzy Lim LPN  Outcome: Progressing  Goal: Effective Urinary Elimination  2/12/2025 2133 by Suzy Lim LPN  Outcome: Met  2/12/2025 1940 by Suzy Lim LPN  Outcome: Progressing     Problem: Fall Injury Risk  Goal: Absence of Fall and Fall-Related Injury  2/12/2025 2133 by Suzy Lim LPN  Outcome: Met  2/12/2025 1940 by Suzy Lim LPN  Outcome: Progressing

## 2025-02-13 NOTE — PLAN OF CARE
Problem: Adult Inpatient Plan of Care  Goal: Patient-Specific Goal (Individualized)  Outcome: Progressing     Problem: Adult Inpatient Plan of Care  Goal: Absence of Hospital-Acquired Illness or Injury  Outcome: Progressing     Problem: Adult Inpatient Plan of Care  Goal: Optimal Comfort and Wellbeing  Outcome: Progressing     Problem: Stroke, Ischemic (Includes Transient Ischemic Attack)  Goal: Optimal Coping  Outcome: Progressing     Problem: Stroke, Ischemic (Includes Transient Ischemic Attack)  Goal: Optimal Cerebral Tissue Perfusion  Outcome: Progressing     Problem: Stroke, Ischemic (Includes Transient Ischemic Attack)  Goal: Improved Communication Skills  Outcome: Progressing     Problem: Stroke, Ischemic (Includes Transient Ischemic Attack)  Goal: Optimal Functional Ability  Outcome: Progressing

## 2025-02-15 LAB
BACTERIA BLD CULT: NORMAL
BACTERIA BLD CULT: NORMAL

## 2025-02-15 NOTE — PHYSICIAN QUERY
Please provide the diagnosis or diagnoses associated with the clinical findings:  Other (please specify): patient has hx of CVA

## 2025-02-15 NOTE — DISCHARGE SUMMARY
[facility] Hospital Medicine Discharge Summary    Admit Date: 2/10/2025  Discharge Date and Time: 2/12/2025  Admitting Physician:  Team  Discharging Physician: Faisal Means MD.  Primary Care Physician: Aston Montelongo MD  Consults: None    Discharge Diagnoses:  Rhabdomyolysis - likely 2/2 drug use  Generalized weakness/ Drowsiness - likely 2/2 drug use  HIV - unclear adherence to ART  Hepatitis-C s/p treatment in 2019  Hx of Cardiac arrest/ICD  Hx of endocarditis involving AICD  - s/p AICD extraction and placement of SICD in 9/2021  IVDU  Hx of mycotic cerebral aneurysm with associated ICH  Hx of CVA requiring tracheostomy placement s/p removed with residual left side weakness     Essential hypertension  Seizure disorder  PTSD  Depression   Leukocytosis  Elevated LFTs  Mild hepatomegaly    Patient left AMA on 2/12/2024.    Hospital Course:   Britton Perez is a 34 y.o. male w/ past medical history of essential hypertension, hx of NICMO, cardiac arrest/ICD, hx of prolonged QT syndrome, hx of endocarditis involving AICD, s/p AICD extraction and placement of SICD in 9/2021, IVDU, mycotic cerebral aneurysm with associated ICH, hepatitis-C s/p treatment in 2019, HIV, seizure disorder, PTSD, and depression, hx of CVA requiring tracheostomy placement which has since been removed with residual left side weakness        The patient presented to [facility] on 2/10/2025 with a primary complaint of weakness onset 2 days ago. States his girlfriend made him come to the hospital. Poor historian with current mentation only answering questions with left words with difficulty to arouse. Mentions has chronic left sided weakness with worsening and generalized weakness. He also complains of bilateral lower extremity edema and speech difficulty. He reports chest pain and shortness of breath yesterday. Notes he used meth 2/9. He admits to marijuana and alcohol use 2/10. He denies visual disturbances.       CTh:No acute intracranial abnormality.   CTA h/n: No acute vascular abnormality identified.  There is, however, decreased caliber and number of branches associated with the right middle cerebral artery relative to the left middle cerebral artery which is stable dating back to 09/30/2021 possibly secondary to sequelae of vasculitis or thromboembolic event.  There also multiple areas of encephalomalacia noted within the right MCA vascular territory. Frothy secretions in the right maxillary sinus, suggestive of acute sinusitis. Mildly enlarged mediastinal lymph node measuring 1.3 cm in long axis, nonspecific but favored to be reactive in etiology  CTA chest - No CT evidence of acute pulmonary embolism or other acute intrathoracic pathology. Mild bibasilar subsegmental atelectasis versus scarring.  Ethanol 21, UDS amp, benzo, cannabinoids, fent +ve  RUQ us 2/11 Mild hepatomegaly. 2. No gallstones or biliary ductal dilatation.  ECHO 2/11 - normal systolic function with a visually estimated ejection fraction of 50 - 55%. Grade II diastolic dysfunction.   CK in 2000s, starting LR @75 cc hr given CHFpEF on ECHO  Ethanol 21, UDS amp, benzo, cannabinoids, fent +ve    S/p 1 x nacran 2/11. CK 2000 >>1100, on LR @75 cc hr given CHFpEF on ECHO. Monitor CK, I&O, cr, K. S/p cefepime in ER. Blood cx NGTD. Monitor fever curve. Pending MRI     HIV (CD4 absolute 154 on 8/2024, undetectable viral load)   - on biktarvy- unclear adherence to ART; F/u cd4  - 2/11 HIV VL - 113, likely was taking ART however with occasional missed doses based on VL (was Undetectable 5 months ago), counseled extensively on compliance and states he was taking biktarvy most days  - he follows up with his PCP for biktarvy refills, will restart Biktarvy at this time  - may need bactrim ppx if CD4<200     Hepatitis-C (RNA neg 8/2024, s/p treatment in 2019). Hepatitis panel ordered, HCV RNA ordered as high risk of re-infection with HCV with drug use hx      Elevated LFTs - monitor. RUQ us 2/11 Mild hepatomegaly. 2. No gallstones or biliary ductal dilatation. Leukocytosis     bilateral lower extremity edema prior to presentation. US - Negative for deep and superficial vein thrombosis in bilateral lower extremities.     Stroke workup:  CTh:No acute intracranial abnormality.   CTA h/n: No acute vascular abnormality identified.  There is, however, decreased caliber and number of branches associated with the right middle cerebral artery relative to the left middle cerebral artery which is stable dating back to 09/30/2021 possibly secondary to sequelae of vasculitis or thromboembolic event.  There also multiple areas of encephalomalacia noted within the right MCA vascular territory. Frothy secretions in the right maxillary sinus, suggestive of acute sinusitis. Mildly enlarged mediastinal lymph node measuring 1.3 cm in long axis, nonspecific but favored to be reactive in etiology  MRI Brain: pending  Echo: ejection fraction of 50 - 55%. Grade II diastolic dysfunction.   CUS: no significant stenosis  -LDL: 65  -A1c: 4.8  -TSH: normal    Patient left AMA on 2/12/2024.    Pt was seen and examined on the day of discharge  Vitals:  VITAL SIGNS: 24 HRS MIN & MAX LAST   No data recorded 98 °F (36.7 °C)   No data recorded 113/78   No data recorded  102   No data recorded 19   No data recorded 96 %       Physical Exam:  General: In no acute distress, afebrile  Chest: Clear to auscultation bilaterally  Heart: RRR, +S1, S2, no appreciable murmur  Abdomen: Soft, nontender, BS +  MSK: Warm, no lower extremity edema, no clubbing or cyanosis  Neurologic: Alert and oriented x4, Cranial nerve II-XII intact, some weakness on left side.    Procedures Performed: No admission procedures for hospital encounter.     Significant Diagnostic Studies: See Full reports for all details    Recent Labs   Lab 02/10/25  2153 02/11/25  1117 02/12/25  0336   WBC 16.10* 12.72* 10.06   RBC 4.53* 3.98* 4.35*    HGB 14.9 13.0* 14.2   HCT 41.5* 36.7* 39.5*   MCV 91.6 92.2 90.8   MCH 32.9* 32.7* 32.6*   MCHC 35.9 35.4 35.9   RDW 12.9 12.9 12.8    163 209   MPV 9.6 9.4 9.5       Recent Labs   Lab 02/10/25  2153 02/11/25  1117 02/12/25  0336    137 138   K 3.3* 3.6 3.7    103 107   CO2 29 27 26   BUN 9.4 8.9 8.1*   CREATININE 0.87 0.73 0.71*   CALCIUM 9.6 8.5 9.0   MG  --  1.80 1.90   ALBUMIN 4.0 3.1* 3.2*   ALKPHOS 78 65 64   * 81* 68*   * 119* 70*   BILITOT 1.0 1.2 0.8        Microbiology Results (last 7 days)       Procedure Component Value Units Date/Time    Blood Culture [[id number]]     Order Status: Canceled Specimen: Blood              CV Ultrasound doppler venous legs bilat  Negative for deep and superficial vein thrombosis in bilateral lower   extremities.         Medication List        ASK your doctor about these medications      albuterol 90 mcg/actuation inhaler  Commonly known as: PROVENTIL/VENTOLIN HFA     BIKTARVY -25 mg (25 kg or greater)  Generic drug: [id number] ala     busPIRone 30 MG Tab  Commonly known as: BUSPAR     LIDOcaine 5 %  Commonly known as: LIDODERM     LORazepam 0.5 MG tablet  Commonly known as: ATIVAN  Take 1 tablet (0.5 mg total) by mouth 3 (three) times daily as needed (withdrawal symptoms).     thiamine 100 MG tablet  Take 1 tablet (100 mg total) by mouth once daily.               Explained in detail to the patient about the discharge plan, medications, and follow-up visits. Pt understands and agrees with the treatment plan  Discharge Disposition: Left Against Medical Advice   Discharged Condition: stable  Diet-    Medications Per DC med rec  Activities as tolerated    For further questions contact hospitalist office    Discharge time 33 minutes    For worsening symptoms, chest pain, shortness of breath, increased abdominal pain, high grade fever, stroke or stroke like symptoms, immediately go to the nearest Emergency Room or call 911  as soon as possible.      Faisal Bravo M.D, on 2/14/2025. at 7:46 PM.

## 2025-02-17 LAB — BACTERIA BLD CULT: NORMAL

## 2025-02-19 NOTE — PHYSICIAN QUERY
Please provide the diagnosis or diagnoses associated with the clinical findings:  Other (please specify): Rhabdomyolysis

## (undated) DEVICE — CATH PACING 6FR

## (undated) DEVICE — SHEATH PINNACLE R/0 II 6F 10CM

## (undated) DEVICE — TRAY CENT PREM SUT REM PK SGL

## (undated) DEVICE — Device

## (undated) DEVICE — SUT SILK 0 SH 30IN BLK BR

## (undated) DEVICE — PAD DEFIB CADENCE ADULT R2